# Patient Record
Sex: FEMALE | Race: WHITE | NOT HISPANIC OR LATINO | Employment: OTHER | URBAN - METROPOLITAN AREA
[De-identification: names, ages, dates, MRNs, and addresses within clinical notes are randomized per-mention and may not be internally consistent; named-entity substitution may affect disease eponyms.]

---

## 2017-09-01 ENCOUNTER — HOSPITAL ENCOUNTER (OUTPATIENT)
Dept: RADIOLOGY | Facility: MEDICAL CENTER | Age: 67
End: 2017-09-01
Attending: INTERNAL MEDICINE
Payer: MEDICARE

## 2017-09-01 DIAGNOSIS — J44.9 CHRONIC OBSTRUCTIVE PULMONARY DISEASE, UNSPECIFIED COPD TYPE (HCC): ICD-10-CM

## 2017-09-01 PROCEDURE — 71250 CT THORAX DX C-: CPT

## 2017-09-08 ENCOUNTER — OFFICE VISIT (OUTPATIENT)
Dept: PULMONOLOGY | Facility: HOSPICE | Age: 67
End: 2017-09-08
Payer: MEDICARE

## 2017-09-08 VITALS
TEMPERATURE: 97.5 F | HEIGHT: 58 IN | WEIGHT: 91.4 LBS | SYSTOLIC BLOOD PRESSURE: 112 MMHG | RESPIRATION RATE: 16 BRPM | OXYGEN SATURATION: 93 % | HEART RATE: 109 BPM | DIASTOLIC BLOOD PRESSURE: 72 MMHG | BODY MASS INDEX: 19.19 KG/M2

## 2017-09-08 DIAGNOSIS — J44.9 CHRONIC OBSTRUCTIVE PULMONARY DISEASE, UNSPECIFIED COPD TYPE (HCC): ICD-10-CM

## 2017-09-08 DIAGNOSIS — R91.8 ABNORMAL RADIOLOGIC FINDING OF LUNG FIELD: ICD-10-CM

## 2017-09-08 PROCEDURE — 99214 OFFICE O/P EST MOD 30 MIN: CPT | Mod: 25 | Performed by: NURSE PRACTITIONER

## 2017-09-08 PROCEDURE — G0009 ADMIN PNEUMOCOCCAL VACCINE: HCPCS | Performed by: NURSE PRACTITIONER

## 2017-09-08 PROCEDURE — 90670 PCV13 VACCINE IM: CPT | Performed by: NURSE PRACTITIONER

## 2017-09-08 RX ORDER — OXYCODONE AND ACETAMINOPHEN 10; 325 MG/1; MG/1
3 TABLET ORAL EVERY 4 HOURS PRN
COMMUNITY
End: 2018-10-31

## 2017-09-08 NOTE — PROGRESS NOTES
Chief Complaint   Patient presents with   • Results     CT results         HPI:  This is a 67 y.o. female with a history of chronic obstructive pulmonary disease. Pulmonary function tests from 9/1/16 indicate FEV1 2.24 L, 118% predicted, FEV1/FVC 78%, FEF 25-75% 108% predicted, and DLCO 135% predicted. The patient is compliant with Spiriva 18 µg one inhalation daily and Ventolin typically one time per day. The patient denies fevers, chills, sweats, and hemoptysis. She has intermittent shortness of breath and cough which is typically nonproductive. She has minimal wheezing. She is feeling sluggish and slow per her report. Ultimately she does not feel that she has a respiratory infection. CT scan dated 9/1/17 indicates stable 6 mm left lower lobe nodule, 4 mm nodule in the right upper lobe is now linear and likely scarring. There has been slight progression of the reticular nodular process in the lateral right upper lobe with increased probable scarring and atelectasis in the medial segment of the right middle lobe and anterior left lower lobe with other multiple small nodules which are stable compared to prior exam. There are signs of emphysema and COPD. Discussed this CT with Dr. Hollis who would recommend bronchoscopy or at least sputum sample for possible evaluation of TRAY. He feels any somewhat frail woman with fairly severe disease she may have underlying TRAY infection. Patient would like to try and produce sputum. If she does not we will obtain bronchoscopy for further evaluation of possible TRAY infection.    The patient has a history of polio and currently ambulates with crutches. She has been diagnosed with post polio syndrome. She has a history of asthma in the child. She is a current every day smoker.      Past Medical History:   Diagnosis Date   • COPD (chronic obstructive pulmonary disease) (CMS-[facility]) 9/8/2017   • Abnormal radiologic finding of lung field 9/8/2017    History of bilateral pulmonary nodules  "  • Asthma    • Back pain    • Chickenpox    • Fatigue    • Nepali measles    • Influenza    • Mumps    • Polio    • Wears glasses        Past Surgical History:   Procedure Laterality Date   • CHOLECYSTECTOMY     • GASTROSTOMY     • PRIMARY C SECTION     • TONSILLECTOMY         Social History   Substance Use Topics   • Smoking status: Current Every Day Smoker     Packs/day: 1.00     Years: 47.00     Types: Cigarettes   • Smokeless tobacco: Never Used   • Alcohol use No       ROS:   Constitutional: Denies fevers, chills, sweats, fatigue, and weight loss.  Eyes: Denies glasses.  Ears/nose/mouth/throat: Denies injury.  Cardiovascular: Denies chest pain, tightness.  Respiratory: See history of present illness.  GI: Denies heartburn, difficulty swallowing, nausea, and vomiting.  Neurological: Denies frequent headaches, dizziness, weakness.    Vitals:  Vitals:    09/08/17 0820   Height: 1.473 m (4' 10\")   Weight: 41.5 kg (91 lb 6.4 oz)   Weight % change since last entry.: 0 %   BP: 112/72   Pulse: (!) 109   BMI (Calculated): 19.1   Resp: 16   Temp: 36.4 °C (97.5 °F)   O2 sat % room air: 93 %       Allergies:  Tape; Ambien [zolpidem]; and Augmentin    Medications:  Current Outpatient Prescriptions   Medication Sig Dispense Refill   • Polyethylene Glycol 3350 (MIRALAX PO) Take  by mouth as needed.     • Oxycodone-Acetaminophen (PERCOCET-10)  MG Tab Take 3 Tabs by mouth every day.     • fluticasone (FLONASE) 50 MCG/ACT nasal spray Spray 1 Spray in nose every day.     • Calcium Carb-Cholecalciferol (CALCIUM 600 + D PO) Take 1 tablet by mouth every day.     • amlodipine (NORVASC) 5 MG Tab Take 5 mg by mouth every day.     • ranitidine (ZANTAC) 150 MG Tab Take 150 mg by mouth 2 times a day.     • Methocarbamol (ROBAXIN-750 PO) Take 1 tablet by mouth 2 Times a Day.     • albuterol (VENTOLIN OR PROVENTIL) 108 (90 BASE) MCG/ACT Aero Soln inhalation aerosol Inhale 2 Puffs by mouth every 6 hours as needed for Shortness of " Breath.     • oxycodone-acetaminophen (PERCOCET)  MG Tab Take 3 Tabs by mouth every four hours as needed for Severe Pain.     • hydrocodone/acetaminophen (NORCO)  MG Tab Take 1-2 Tabs by mouth every 6 hours as needed.       No current facility-administered medications for this visit.        PHYSICAL EXAM:  Appearance: Well-developed, well-nourished, no acute distress.  Eyes. PERRL.  Hearing: Grossly intact.  Oropharynx: Tongue normal, posterior pharynx without erythema or exudate.  Respiratory effort: No intercostal retractions or use of accessory muscles.  Lung auscultation: No crackles, wheezing.  Heart auscultation: No murmur, gallop, or rub. Regular rate and rhythm.  Extremities: No cyanosis or edema.  Gait and Station: Normal  Orientation: Oriented to time, place, and person.    Assessment:  1. Chronic obstructive pulmonary disease, unspecified COPD type (CMS-HCC)  PNEUMOCOCCAL CONJUGATE VACCINE 13-VALENT    AMB PULMONARY FUNCTION TEST/LAB    AFB CULTURE    CULTURE RESPIRATORY W/ GRM STN    SPUTUM CULTURE    CANCELED: CT-CHEST (THORAX) W/O   2. Abnormal radiologic finding of lung field  AFB CULTURE    CULTURE RESPIRATORY W/ GRM STN    SPUTUM CULTURE         Plan:  1. Patient will obtain influenza vaccine through primary care, [facility].  2. Prevnar 13 today.  3. Patient will require pneumococcal 23 between March and September 2018.  4. Pulmonary function tests at next visit in 3 months.  5. Sputum culture for AFB, Gram stain. If patient cannot produce a sputum sample will order bronchoscopy to look for atypical infection question TRAY. CT shows new reticular nodular process in the lateral right upper lobe.    Return in about 2 months (around 11/8/2017) for With PFT, With ROBEL Musa.

## 2017-09-08 NOTE — PATIENT INSTRUCTIONS
"1. Patient will obtain influenza vaccine through primary care, [facility].  2. Prevnar 13 today.  3. Patient will require pneumococcal 23 between March and September 2018.  4. Pulmonary function tests at next visit in 3 months.  5. Sputum culture for AFB, Gram stain. If patient cannot produce a sputum sample will order bronchoscopy to look for atypical infection question TRAY. CT shows new reticular nodular process in the lateral right upper lobe.              You Can Quit Smoking  If you are ready to quit smoking or are thinking about it, congratulations! You have chosen to help yourself be healthier and live longer! There are lots of different ways to quit smoking. Nicotine gum, nicotine patches, a nicotine inhaler, or nicotine nasal spray can help with physical craving. Hypnosis, support groups, and medicines help break the habit of smoking.  TIPS TO GET OFF AND STAY OFF CIGARETTES  · Learn to predict your moods. Do not let a bad situation be your excuse to have a cigarette. Some situations in your life might tempt you to have a cigarette.  · Ask friends and co-workers not to smoke around you.  · Make your home smoke-free.  · Never have \"just one\" cigarette. It leads to wanting another and another. Remind yourself of your decision to quit.  · On a card, make a list of your reasons for not smoking. Read it at least the same number of times a day as you have a cigarette. Tell yourself everyday, \"I do not want to smoke. I choose not to smoke.\"  · Ask someone at home or work to help you with your plan to quit smoking.  · Have something planned after you eat or have a cup of coffee. Take a walk or get other exercise to perk you up. This will help to keep you from overeating.  · Try a relaxation exercise to calm you down and decrease your stress. Remember, you may be tense and nervous the first two weeks after you quit. This will pass.  · Find new activities to keep your hands busy. Play with a pen, coin, or rubber band. " "Doodle or draw things on paper.  · Brush your teeth right after eating. This will help cut down the craving for the taste of tobacco after meals. You can try mouthwash too.  · Try gum, breath mints, or diet candy to keep something in your mouth.  IF YOU SMOKE AND WANT TO QUIT:  · Do not stock up on cigarettes. Never buy a carton. Wait until one pack is finished before you buy another.  · Never carry cigarettes with you at work or at home.  · Keep cigarettes as far away from you as possible. Leave them with someone else.  · Never carry matches or a lighter with you.  · Ask yourself, \"Do I need this cigarette or is this just a reflex?\"  · Bet with someone that you can quit. Put cigarette money in a [employer] bank every morning. If you smoke, you give up the money. If you do not smoke, by the end of the week, you keep the money.  · Keep trying. It takes 21 days to change a habit!  · Talk to your doctor about using medicines to help you quit. These include nicotine replacement gum, lozenges, or skin patches.     This information is not intended to replace advice given to you by your health care provider. Make sure you discuss any questions you have with your health care provider.     Document Released: 10/14/2010 Document Revised: 03/11/2013 Document Reviewed: 10/14/2010  [employer] Patient Education ©2016 [employer]    "

## 2017-09-18 ENCOUNTER — HOSPITAL ENCOUNTER (OUTPATIENT)
Facility: MEDICAL CENTER | Age: 67
End: 2017-09-18
Attending: NURSE PRACTITIONER
Payer: MEDICARE

## 2017-09-18 DIAGNOSIS — J44.9 CHRONIC OBSTRUCTIVE PULMONARY DISEASE, UNSPECIFIED COPD TYPE (HCC): ICD-10-CM

## 2017-09-18 DIAGNOSIS — R91.8 ABNORMAL RADIOLOGIC FINDING OF LUNG FIELD: ICD-10-CM

## 2017-09-18 PROCEDURE — 87206 SMEAR FLUORESCENT/ACID STAI: CPT

## 2017-09-18 PROCEDURE — 87015 SPECIMEN INFECT AGNT CONCNTJ: CPT

## 2017-09-18 PROCEDURE — 87116 MYCOBACTERIA CULTURE: CPT

## 2017-09-18 PROCEDURE — 87070 CULTURE OTHR SPECIMN AEROBIC: CPT

## 2017-09-18 PROCEDURE — 87205 SMEAR GRAM STAIN: CPT

## 2017-09-19 LAB
GRAM STN SPEC: NORMAL
SIGNIFICANT IND 70042: NORMAL
SOURCE SOURCE: NORMAL

## 2017-09-20 LAB
BACTERIA SPEC RESP CULT: NORMAL
GRAM STN SPEC: NORMAL
RHODAMINE-AURAMINE STN SPEC: NORMAL
SIGNIFICANT IND 70042: NORMAL
SIGNIFICANT IND 70042: NORMAL
SITE SITE: NORMAL
SOURCE SOURCE: NORMAL
SOURCE SOURCE: NORMAL

## 2017-09-28 ENCOUNTER — TELEPHONE (OUTPATIENT)
Dept: PULMONOLOGY | Facility: HOSPICE | Age: 67
End: 2017-09-28

## 2017-09-28 NOTE — TELEPHONE ENCOUNTER
Informed patient that AFB and fungal cultures are still in progress. Gram stain shows no specific bacteria. The patient reports no fevers, chills, sweats, or hemoptysis. She still has shortness of breath but this is fairly stable. At this time she does not feel sick. She will contact me if anything should change. At this time she is still declining bronchoscopy but has an appointment 11/17/17 with myself.

## 2017-09-28 NOTE — TELEPHONE ENCOUNTER
Pt called requesting the results to the labs done on 9/18/17.    Last OV: 9/8/17- Daphne  Next OV: 11/17/17  COPD    Sherri Burgos is attached to this message for she saw the pt last.

## 2017-11-01 ENCOUNTER — NON-PROVIDER VISIT (OUTPATIENT)
Dept: PULMONOLOGY | Facility: HOSPICE | Age: 67
End: 2017-11-01
Payer: MEDICARE

## 2017-11-01 DIAGNOSIS — J44.9 CHRONIC OBSTRUCTIVE PULMONARY DISEASE, UNSPECIFIED COPD TYPE (HCC): ICD-10-CM

## 2017-11-01 PROCEDURE — 94726 PLETHYSMOGRAPHY LUNG VOLUMES: CPT | Performed by: INTERNAL MEDICINE

## 2017-11-01 PROCEDURE — 94060 EVALUATION OF WHEEZING: CPT | Performed by: INTERNAL MEDICINE

## 2017-11-01 PROCEDURE — 94729 DIFFUSING CAPACITY: CPT | Performed by: INTERNAL MEDICINE

## 2017-11-01 ASSESSMENT — PULMONARY FUNCTION TESTS
FEV1_PERCENT_PREDICTED: 97
FEV1/FVC_PERCENT_CHANGE: 100
FEV1_PERCENT_PREDICTED: 98
FEV1_PREDICTED: 1.87
FVC_PERCENT_PREDICTED: 114
FEV1/FVC_PERCENT_PREDICTED: 76
FVC_PREDICTED: 2.45
FEV1: 1.82
FEV1_PERCENT_CHANGE: -1
FVC_PERCENT_PREDICTED: 113
FEV1/FVC: 66
FVC: 2.77
FEV1_PERCENT_CHANGE: -1
FEV1/FVC_PERCENT_PREDICTED: 86
FVC: 2.81
FEV1: 1.85
FEV1/FVC: 65.7
FEV1/FVC_PERCENT_PREDICTED: 86

## 2017-11-01 NOTE — PROCEDURES
Good patient effort & cooperation.  The results of this test meet the ATS standards for acceptability and repeatability. There was much coughing in the Pre FVC maneuvers. A bronchodilator of Ventolin HFA- 2puffs via spacer was administered.    The FVC is 2.77 L or 113%, FEV1 is 1.82 L or 97%, FEV1/FVC 66%. Increased TLC and RV. DLCO 104%. No reversibility noted.    Interpretation:  PFTs are consistent with stage I COPD.  Hyperinflation and air trapping consistent with mild obstructive lung disease.  Normal gas transfer.  Lack of bronchodilator response does not preclude using inhalers when clinically indicated.

## 2017-11-15 LAB
MYCOBACTERIUM SPEC CULT: NORMAL
RHODAMINE-AURAMINE STN SPEC: NORMAL
SIGNIFICANT IND 70042: NORMAL
SITE SITE: NORMAL
SOURCE SOURCE: NORMAL

## 2017-11-28 ENCOUNTER — OFFICE VISIT (OUTPATIENT)
Dept: PULMONOLOGY | Facility: HOSPICE | Age: 67
End: 2017-11-28
Payer: MEDICARE

## 2017-11-28 VITALS
HEART RATE: 101 BPM | HEIGHT: 58 IN | OXYGEN SATURATION: 93 % | TEMPERATURE: 97.9 F | RESPIRATION RATE: 16 BRPM | SYSTOLIC BLOOD PRESSURE: 140 MMHG | BODY MASS INDEX: 19.1 KG/M2 | WEIGHT: 91 LBS | DIASTOLIC BLOOD PRESSURE: 82 MMHG

## 2017-11-28 DIAGNOSIS — J44.9 COPD, MILD (HCC): ICD-10-CM

## 2017-11-28 DIAGNOSIS — R91.8 RETICULONODULAR INFILTRATE PRESENT ON IMAGING OF CHEST: ICD-10-CM

## 2017-11-28 PROCEDURE — 99214 OFFICE O/P EST MOD 30 MIN: CPT | Performed by: INTERNAL MEDICINE

## 2017-11-28 RX ORDER — METHOCARBAMOL 750 MG/1
750 TABLET, FILM COATED ORAL 3 TIMES DAILY
Refills: 0 | COMMUNITY
Start: 2017-11-20 | End: 2018-10-31

## 2017-11-28 RX ORDER — BUSPIRONE HYDROCHLORIDE 30 MG/1
TABLET ORAL
Refills: 2 | COMMUNITY
Start: 2017-11-15

## 2017-11-28 RX ORDER — ALBUTEROL SULFATE 90 UG/1
2 AEROSOL, METERED RESPIRATORY (INHALATION) EVERY 4 HOURS PRN
Qty: 1 INHALER | Refills: 11 | Status: SHIPPED | OUTPATIENT
Start: 2017-11-28 | End: 2018-06-18 | Stop reason: SDUPTHER

## 2017-11-28 RX ORDER — ALBUTEROL SULFATE 90 UG/1
2 AEROSOL, METERED RESPIRATORY (INHALATION) EVERY 4 HOURS PRN
Qty: 1 INHALER | Refills: 1 | Status: SHIPPED | OUTPATIENT
Start: 2017-11-28 | End: 2017-11-28 | Stop reason: SDUPTHER

## 2017-11-28 NOTE — PROGRESS NOTES
Jewell Skelton is a 67 y.o. female here for COPD.    History of Present Illness:    The patient's a 67-year-old female comes in for follow-up of COPD. She also some mild reticular nodular infiltrates in the right midlung. Sputum AFBs were performed which were negative. The patient still an active smoker. Pulmonary function tests show evidence of mild airflow obstruction and there was not a significant improvement after bronchodilator. The diffusion capacity is within normal limits. There is air trapping on lung volumes. Patient has some mild coughing of yellow mucus production. She wants to quit smoking. She has otherwise no other new concerns today.    Constitutional:  Negative for fever, chills, sweats, and fatigue.  Eyes:  Negative for eye pain and visual changes.  HENT:  Negative for tinnitus and hoarse voice.  Cardiovascular:  Negative for chest pain, leg swelling, syncope and orthopnea.  Respiratory:  See HPI for pertinent negatives  Sleep:  Negative for somnolence, loud snoring, sleep disturbance due to breathing, insomnia.  Gastrointestinal:  Negative for dysphagia, nausea and abdominal pain.  Heme/lymph:  Denies easy bruising, blood clots.  Musculoskeletal:  Negative for arthralgias, sore muscles and back pain.  Skin:  Negative for rash and color change.  Neurological:  Negative for headaches, lightheadedness and weakness.  Psychiatric:  Denies depression.    Current Outpatient Prescriptions   Medication Sig Dispense Refill   • albuterol (PROAIR HFA) 108 (90 Base) MCG/ACT Aero Soln inhalation aerosol Inhale 2 Puffs by mouth every four hours as needed for Shortness of Breath (wheezing). 1 Inhaler 11   • busPIRone (BUSPAR) 30 MG tablet TAKE ONE TABLET BY MOUTH TWICE PER DAY.  2   • methocarbamol (ROBAXIN) 750 MG Tab Take 750 mg by mouth 3 times a day.  0   • oxycodone-acetaminophen (PERCOCET)  MG Tab Take 3 Tabs by mouth every four hours as needed for Severe Pain.     • Polyethylene Glycol 3350 (MIRALAX  "PO) Take  by mouth as needed.     • Oxycodone-Acetaminophen (PERCOCET-10)  MG Tab Take 3 Tabs by mouth every day.     • fluticasone (FLONASE) 50 MCG/ACT nasal spray Spray 1 Spray in nose every day.     • Calcium Carb-Cholecalciferol (CALCIUM 600 + D PO) Take 1 tablet by mouth every day.     • amlodipine (NORVASC) 5 MG Tab Take 5 mg by mouth every day.     • ranitidine (ZANTAC) 150 MG Tab Take 150 mg by mouth 2 times a day.     • hydrocodone/acetaminophen (NORCO)  MG Tab Take 1-2 Tabs by mouth every 6 hours as needed.     • Methocarbamol (ROBAXIN-750 PO) Take 1 tablet by mouth 2 Times a Day.     • albuterol (VENTOLIN OR PROVENTIL) 108 (90 BASE) MCG/ACT Aero Soln inhalation aerosol Inhale 2 Puffs by mouth every 6 hours as needed for Shortness of Breath.       No current facility-administered medications for this visit.        Social History   Substance Use Topics   • Smoking status: Current Every Day Smoker     Packs/day: 1.00     Years: 47.00     Types: Cigarettes   • Smokeless tobacco: Never Used   • Alcohol use No       Past Medical History:   Diagnosis Date   • Abnormal radiologic finding of lung field 9/8/2017    History of bilateral pulmonary nodules   • Asthma    • Back pain    • Chickenpox    • COPD (chronic obstructive pulmonary disease) (CMS-[facility]) 9/8/2017   • Fatigue    • Vietnamese measles    • Influenza    • Mumps    • Polio    • Wears glasses        Past Surgical History:   Procedure Laterality Date   • CHOLECYSTECTOMY     • GASTROSTOMY     • PRIMARY C SECTION     • TONSILLECTOMY         Allergies:  Tape; Ambien [zolpidem]; and Augmentin    Family History   Problem Relation Age of Onset   • Diabetes Mother    • Lung Cancer Father        Physical Examination    Vitals:    11/28/17 0805   Height: 1.473 m (4' 10\")   Weight: 41.3 kg (91 lb)   Weight % change since last entry.: 0 %   BP: 140/82   Pulse: (!) 101   BMI (Calculated): 19.02   Resp: 16   Temp: 36.6 °C (97.9 °F)   O2 sat % room air: 93 % "       Physical Exam:  Constitutional:  Well developed and well nourished.  Head:  Normocephalic and atraumatic.  Nose:  Nose normal.  Mouth/Throat:  Oropharynx is clear and moist, no lesions.    Neck:  Normal range of motion.  Supple.  No JVD.  Cardiovascular:  Normal rate, regular rhythm, normal heart sounds. No edema  Pulmonary/Chest: No wheezing, rales or rhonchi.  Respiratory effort non labored  Musculoskeletal.  No muscular atrophy.  Lymphadenopathy:  No cervical or supraclavicular adenopathy  Neurological:  Alert and oriented.  Cranial nerves intact.  No focal deficits  Skin:  No rashes or ulcers.  Psyciatric:  Normal mood and affect.      Assessment and Plan:  1. COPD, mild (CMS-HCC)  The patient has mild COPD based on pulmonary function testing. She still actively smoking. She is on Spiriva. I referred her to the smoking cessation program. I strongly encourage patient to quit smoking and she is going to use the NicoDerm patches. I have also prescribed albuterol inhaler to be used as a rescue inhaler.  - REFERRAL TO TOBACCO CESSATION PROGRAM    2. Reticulonodular infiltrate present on imaging of chest  I reviewed the CT scan of the chest with the patient. She has very mild reticular nodular type infiltrates. This could be related to TRAY disease or could be inflammation from smoking. Neoplastic disease possibility but less likely. I have offered the patient bronchoscopy but she is refusing. I have suggested that she should work on quitting smoking. We will arrange for her to have a flutter device which she should use at least 2-3 times a day. And we will repeat the CT scan of chest in April. If there is continued progression then recommended bronchoscopy or CT biopsy.  - DME OTHER  - CT-CHEST (THORAX) W/O; Future  - REFERRAL TO TOBACCO CESSATION PROGRAM        Followup Return in about 5 months (around 4/28/2018) for follow up visit with APRN, follow up with the pulmonary physician.

## 2018-02-27 ENCOUNTER — TELEPHONE (OUTPATIENT)
Dept: PULMONOLOGY | Facility: HOSPICE | Age: 68
End: 2018-02-27

## 2018-02-27 DIAGNOSIS — J44.9 CHRONIC OBSTRUCTIVE PULMONARY DISEASE, UNSPECIFIED COPD TYPE (HCC): ICD-10-CM

## 2018-02-27 NOTE — TELEPHONE ENCOUNTER
Chani the pt's daughter called requesting surgical clearance, states that Dr. Jerzy Barrera is going to perform thumb surgery on the pt once she is cleared from a pulmonary standpoint.    Last OV: 11/28/17- Dr. Hollis    Next OV: 5/16/18    COPD

## 2018-02-27 NOTE — TELEPHONE ENCOUNTER
Soila,    I spoke w/ Chani the pt's daughter, gave her your message and transferred her to scheduling to make sooner ov.    Order pending for CXR.

## 2018-02-27 NOTE — TELEPHONE ENCOUNTER
Pt has adequate lung function for needed surgery based on PFT's in November 2017. However if she requires clearance from our office then an updated chest xray and office visit would be needed.

## 2018-03-16 ENCOUNTER — APPOINTMENT (OUTPATIENT)
Dept: RADIOLOGY | Facility: IMAGING CENTER | Age: 68
End: 2018-03-16
Payer: MEDICARE

## 2018-03-16 ENCOUNTER — APPOINTMENT (OUTPATIENT)
Dept: PULMONOLOGY | Facility: HOSPICE | Age: 68
End: 2018-03-16
Payer: MEDICARE

## 2018-04-16 ENCOUNTER — OFFICE VISIT (OUTPATIENT)
Dept: PULMONOLOGY | Facility: HOSPICE | Age: 68
End: 2018-04-16
Payer: MEDICARE

## 2018-04-16 VITALS
BODY MASS INDEX: 17 KG/M2 | HEIGHT: 58 IN | RESPIRATION RATE: 16 BRPM | WEIGHT: 81 LBS | HEART RATE: 108 BPM | SYSTOLIC BLOOD PRESSURE: 140 MMHG | DIASTOLIC BLOOD PRESSURE: 78 MMHG | OXYGEN SATURATION: 92 % | TEMPERATURE: 97.9 F

## 2018-04-16 DIAGNOSIS — R91.8 ABNORMAL RADIOLOGIC FINDING OF LUNG FIELD: ICD-10-CM

## 2018-04-16 DIAGNOSIS — J44.9 CHRONIC OBSTRUCTIVE PULMONARY DISEASE, UNSPECIFIED COPD TYPE (HCC): ICD-10-CM

## 2018-04-16 DIAGNOSIS — Z72.0 TOBACCO USE: ICD-10-CM

## 2018-04-16 PROCEDURE — 99214 OFFICE O/P EST MOD 30 MIN: CPT | Performed by: NURSE PRACTITIONER

## 2018-04-16 RX ORDER — TIOTROPIUM BROMIDE 18 UG/1
18 CAPSULE ORAL; RESPIRATORY (INHALATION) DAILY
COMMUNITY
End: 2018-04-16

## 2018-04-16 RX ORDER — LORATADINE 10 MG/1
10 TABLET ORAL
Refills: 11 | COMMUNITY
Start: 2018-03-28 | End: 2018-10-31

## 2018-04-16 RX ORDER — METHYLPREDNISOLONE 4 MG/1
TABLET ORAL
Qty: 21 TAB | Refills: 0 | Status: SHIPPED | OUTPATIENT
Start: 2018-04-16 | End: 2018-05-16

## 2018-04-16 RX ORDER — AZITHROMYCIN 250 MG/1
TABLET, FILM COATED ORAL
Qty: 6 TAB | Refills: 0 | Status: SHIPPED | OUTPATIENT
Start: 2018-04-16 | End: 2018-07-16

## 2018-04-16 NOTE — PATIENT INSTRUCTIONS
1) Start Stiolto 2 puff, once a day - sample sent  2) Continue rescue inhaler as needed  3) Smoking cessation discussed  4) CT of chest now for f/u  5) Vaccines: Up to date with Prevnar 13, Pneumovax 23  6) Return in about 6 weeks (around 5/28/2018) for CAT scan results, review of symptoms, if not sooner, follow up with ROBEL Robles.

## 2018-04-16 NOTE — PROGRESS NOTES
CC:  Here for f/u pulmonary issues as listed below    HPI:   Jewell presents today for follow up for COPD. PFTs from 11/2017 indicate a Fev1 of 1.85L or 98% predicted without bronchodilator response, Fev1/FVC ratio of 66, DLCO 104%.  Patient is a current smoker one pack per day, 47-pack-year history. She is trying to quit and has been going to the [facility] therapist with some benefit. She declined smoking cessation program. CAT scan of her tests completed September 1, 2017 showed stable 6 mm left lower lobe nodule, largest 4 mm nodule in the right upper lobe is now a linear area of scarring.Slight progression of a reticulonodular process in the lateral right upper lobe with increase in probable scarring and atelectasis in the medial segment right middle lobe and anterior left lower lobe. These areas are most consistent with inflammatory process.  Multiple other smaller nodules are similar to the prior study. There are no new suspicious nodules.  There is underlying emphysema and COPD. per the CAT scan results Dr. warren recommended bronchoscopy or CT biopsy given her results may be related to TRAY, or inflammation or neoplastic disease. She forgot to complete the CAT scan before her today. She understands the benefits of completing updated CAT scan.    She is compliant using Spiriva daily and rescue inhaler daily. She finds that her breathing is stable since last office visit. She has an occasional cough and occasional chest tightness related to anxiety. She has been having an increase in nasal congestion producing white mucus.          Patient Active Problem List    Diagnosis Date Noted   • Tobacco use 04/16/2018   • COPD (chronic obstructive pulmonary disease) (CMS-[facility]) 09/08/2017   • Abnormal radiologic finding of lung field 09/08/2017       Past Medical History:   Diagnosis Date   • Abnormal radiologic finding of lung field 9/8/2017    History of bilateral pulmonary nodules   • Asthma    • Back pain    •  "Chickenpox    • COPD (chronic obstructive pulmonary disease) (CMS-[facility]) 9/8/2017   • Fatigue    • Turkish measles    • Influenza    • Mumps    • Polio    • Wears glasses        Past Surgical History:   Procedure Laterality Date   • CHOLECYSTECTOMY     • GASTROSTOMY     • PRIMARY C SECTION     • TONSILLECTOMY         Family History   Problem Relation Age of Onset   • Diabetes Mother    • Lung Cancer Father        Social History   Substance Use Topics   • Smoking status: Current Every Day Smoker     Packs/day: 1.00     Years: 47.00     Types: Cigarettes   • Smokeless tobacco: Never Used      Comment: \"working on cutting down as a family\"   • Alcohol use No       Current Outpatient Prescriptions   Medication Sig Dispense Refill   • loratadine (CLARITIN) 10 MG Tab Take 10 mg by mouth every day.  11   • Tiotropium Bromide-Olodaterol (STIOLTO RESPIMAT) 2.5-2.5 MCG/ACT Aero Soln Inhale 2 Puffs by mouth every day. 1 Inhaler 11   • azithromycin (ZITHROMAX) 250 MG Tab Take 2 tablets on day 1, then take 1 tablet a day for 4 days. 6 Tab 0   • MethylPREDNISolone (MEDROL DOSEPAK) 4 MG Tablet Therapy Pack Take as directed. 21 Tab 0   • busPIRone (BUSPAR) 30 MG tablet TAKE ONE TABLET BY MOUTH TWICE PER DAY.  2   • methocarbamol (ROBAXIN) 750 MG Tab Take 750 mg by mouth 3 times a day.  0   • albuterol (PROAIR HFA) 108 (90 Base) MCG/ACT Aero Soln inhalation aerosol Inhale 2 Puffs by mouth every four hours as needed for Shortness of Breath (wheezing). 1 Inhaler 11   • oxycodone-acetaminophen (PERCOCET)  MG Tab Take 3 Tabs by mouth every four hours as needed for Severe Pain.     • fluticasone (FLONASE) 50 MCG/ACT nasal spray Spray 1 Spray in nose every day.     • Calcium Carb-Cholecalciferol (CALCIUM 600 + D PO) Take 1 tablet by mouth every day.     • amlodipine (NORVASC) 5 MG Tab Take 5 mg by mouth every day.     • ranitidine (ZANTAC) 150 MG Tab Take 150 mg by mouth 2 times a day.     • Polyethylene Glycol 3350 (MIRALAX PO) Take  " "by mouth as needed.     • Oxycodone-Acetaminophen (PERCOCET-10)  MG Tab Take 3 Tabs by mouth every day.     • hydrocodone/acetaminophen (NORCO)  MG Tab Take 1-2 Tabs by mouth every 6 hours as needed.     • Methocarbamol (ROBAXIN-750 PO) Take 1 tablet by mouth 3 times a day.     • albuterol (VENTOLIN OR PROVENTIL) 108 (90 BASE) MCG/ACT Aero Soln inhalation aerosol Inhale 2 Puffs by mouth every 6 hours as needed for Shortness of Breath.       No current facility-administered medications for this visit.           Allergies: Tape; Ambien [zolpidem]; and Augmentin          ROS   Gen: Denies fever, chills, unintentional weight loss, fatigue, night sweats  E/N/T: Denies  ear pain  Resp:Denies  wheezing, hemoptysis  CV: Denies chest pain, chest tightness, palpitations  Sleep:Denies morning headache  Neuro: Denies frequent headaches, weakness, dizziness  GI: Denies acid reflux, N/V  See HPI.  All other systems reviewed and negative          Vital signs for this encounter:  Vitals:    04/16/18 1317   Height: 1.473 m (4' 10\")   Weight: 36.7 kg (81 lb)   Weight % change since last entry.: 0 %   BP: 140/78   Pulse: (!) 108   BMI (Calculated): 16.93   Resp: 16   Temp: 36.6 °C (97.9 °F)   O2 sat % room air: 92 %                 Physical Exam:   Appearance: well developed, well nourished, no acute distress.   Eyes: PERRL, EOM intact, sclere white, conjunctiva moist.  Ears: no lesions or deformities.  Hearing: grossly intact.  Nose: no lesions or deformities.  Dentition: good dentition.   Oropharynx: tongue normal, posterior pharynx without erythema or exudate.  Neck: supple, trachea midline, no masses.  Respiratory effort: no intercostal retractions or use of accessory muscles.  Lung auscultation: Bilateral diminished   Heart auscultation: no murmur, rub, or gallop   Extremities: no cyanosis or edema.  Abdomen: soft, non-tender, no masses.  Gait and station: without difficulty   Digits and Nails: no clubbing, cyanosis, " petechiae, or nodes.  Cranial nerves: grossly normal.  Motor: no focal deficits observed.   Skin: no rashes, lesions, or ulcers noted.  Orientation: oriented to time, place, and person.  Mood and affect: mood and affect appropriate, normal interaction with examiner.      Assessment   1. Abnormal radiologic finding of lung field     2. Chronic obstructive pulmonary disease, unspecified COPD type (CMS-HCC)     3. Tobacco use         Patient was seen for 25 minutes, more than 50% of time spent in face to face review, counseling, and arranging future evaluation and follow up of medical conditions and care.     PLAN:   Patient Instructions   1) Start Stiolto 2 puff, once a day - sample sent  2) Continue rescue inhaler as needed  3) Smoking cessation discussed  4) CT of chest now for f/u  5) Vaccines: Up to date with Prevnar 13, Pneumovax 23  6) Return in about 6 weeks (around 5/28/2018) for CAT scan results, review of symptoms, if not sooner, follow up with ROBEL Robles.

## 2018-04-27 ENCOUNTER — APPOINTMENT (OUTPATIENT)
Dept: RADIOLOGY | Facility: MEDICAL CENTER | Age: 68
End: 2018-04-27
Attending: PHYSICIAN ASSISTANT
Payer: MEDICARE

## 2018-04-29 ENCOUNTER — HOSPITAL ENCOUNTER (OUTPATIENT)
Dept: RADIOLOGY | Facility: MEDICAL CENTER | Age: 68
End: 2018-04-29
Attending: INTERNAL MEDICINE
Payer: MEDICARE

## 2018-04-29 DIAGNOSIS — R91.8 RETICULONODULAR INFILTRATE PRESENT ON IMAGING OF CHEST: ICD-10-CM

## 2018-04-29 PROCEDURE — 71250 CT THORAX DX C-: CPT

## 2018-05-15 ENCOUNTER — HOSPITAL ENCOUNTER (OUTPATIENT)
Dept: RADIOLOGY | Facility: MEDICAL CENTER | Age: 68
End: 2018-05-15
Attending: PHYSICIAN ASSISTANT
Payer: MEDICARE

## 2018-05-15 DIAGNOSIS — Z12.31 VISIT FOR SCREENING MAMMOGRAM: ICD-10-CM

## 2018-05-15 PROCEDURE — 77067 SCR MAMMO BI INCL CAD: CPT

## 2018-05-16 ENCOUNTER — OFFICE VISIT (OUTPATIENT)
Dept: PULMONOLOGY | Facility: HOSPICE | Age: 68
End: 2018-05-16
Payer: MEDICARE

## 2018-05-16 VITALS
WEIGHT: 80 LBS | SYSTOLIC BLOOD PRESSURE: 136 MMHG | HEIGHT: 58 IN | OXYGEN SATURATION: 96 % | DIASTOLIC BLOOD PRESSURE: 86 MMHG | TEMPERATURE: 97.2 F | BODY MASS INDEX: 16.79 KG/M2 | RESPIRATION RATE: 16 BRPM | HEART RATE: 96 BPM

## 2018-05-16 DIAGNOSIS — R91.8 PULMONARY NODULES: ICD-10-CM

## 2018-05-16 DIAGNOSIS — Z72.0 TOBACCO USE: ICD-10-CM

## 2018-05-16 DIAGNOSIS — Z71.6 TOBACCO ABUSE COUNSELING: ICD-10-CM

## 2018-05-16 DIAGNOSIS — J44.9 CHRONIC OBSTRUCTIVE PULMONARY DISEASE, UNSPECIFIED COPD TYPE (HCC): ICD-10-CM

## 2018-05-16 PROCEDURE — 99214 OFFICE O/P EST MOD 30 MIN: CPT | Performed by: NURSE PRACTITIONER

## 2018-05-16 RX ORDER — AZITHROMYCIN 250 MG/1
TABLET, FILM COATED ORAL
Qty: 6 TAB | Refills: 0 | Status: SHIPPED | OUTPATIENT
Start: 2018-05-16 | End: 2018-07-16

## 2018-05-16 RX ORDER — PREDNISONE 10 MG/1
TABLET ORAL
Qty: 18 TAB | Refills: 0 | Status: SHIPPED | OUTPATIENT
Start: 2018-05-16 | End: 2018-07-16

## 2018-05-16 NOTE — PATIENT INSTRUCTIONS
1) Start Anoro 1 puff, once a day - sample sent  2) Continue rescue inhaler as needed  3) Smoking cessation discussed  4) Prednisone taper and zpack for emergency   5) Vaccines: Up to date with Prevnar 13, Pneumovax 23  6) Encourage light conditioning  7) Return in about 2 months (around 7/16/2018) for follow up with ROBEL Roblse, if not sooner, review of symptoms.

## 2018-05-16 NOTE — PROGRESS NOTES
CC:  Here for f/u pulmonary issues as listed below    HPI:   Jewell presents today for follow up for COPD. PFTs from 11/2017 indicate a Fev1 of 1.85L or 98% predicted without bronchodilator response, Fev1/FVC ratio of 66, DLCO 104%.  Patient is a current smoker one pack per day, 47-pack-year history. She is trying to quit and has been going to the [facility] therapist with some benefit. She continues to decline smoking cessation program.     CAT scan of her tests completed September 1, 2017 showed stable 6 mm left lower lobe nodule, largest 4 mm nodule in the right upper lobe is now a linear area of scarring.Slight progression of a reticulonodular process in the lateral right upper lobe with increase in probable scarring and atelectasis in the medial segment right middle lobe and anterior left lower lobe. These areas are most consistent with inflammatory process.  Multiple other smaller nodules are similar to the prior study. There are no new suspicious nodules.  There is underlying emphysema and COPD. per the CAT scan results Dr. warren recommended bronchoscopy or CT biopsy given her results may be related to TRAY, or inflammation or neoplastic disease. She did not complete updated scan until 4/29/2018 showing decrease in prominence of reticular nodular opacifications laterally in right upper lobe.  Stability and 6 mm noncalcified pulmonary nodule in left lower lobe.  Unchanged scattered smaller peripheral pulmonary nodules.  Review CAT scan with Dr. Hagan who recommends yearly CAT scan.    Required Z-Emerson and Medrol pack since last office visit.  She did not realize to take them together.  After she took Medrol pack she became very nervous, lower back pain and had trouble sleeping.  Overall her symptoms are much improved.  She was started on Stiolto last office visit but has difficulty twisting the inhaler.  She found Spiriva to be more beneficial, however she has not required her rescue inhaler since last  "office visit.  She finds that her breathing is stable since last office visit. She has an occasional cough with white mucus, and occasional chest tightness related to anxiety. She has been having an increase in nasal congestion producing white mucus.        Patient Active Problem List    Diagnosis Date Noted   • Tobacco abuse counseling 05/16/2018   • Pulmonary nodules 05/16/2018   • Tobacco use 04/16/2018   • COPD (chronic obstructive pulmonary disease) ([facility]) 09/08/2017   • Abnormal radiologic finding of lung field 09/08/2017       Past Medical History:   Diagnosis Date   • Abnormal radiologic finding of lung field 9/8/2017    History of bilateral pulmonary nodules   • Asthma    • Back pain    • Chickenpox    • COPD (chronic obstructive pulmonary disease) ([facility]) 9/8/2017   • Fatigue    • Mohawk measles    • Influenza    • Mumps    • Polio    • Wears glasses        Past Surgical History:   Procedure Laterality Date   • CHOLECYSTECTOMY     • GASTROSTOMY     • PRIMARY C SECTION     • TONSILLECTOMY         Family History   Problem Relation Age of Onset   • Diabetes Mother    • Lung Cancer Father        Social History   Substance Use Topics   • Smoking status: Current Every Day Smoker     Packs/day: 1.00     Years: 47.00     Types: Cigarettes   • Smokeless tobacco: Never Used      Comment: \"working on cutting down as a family\"   • Alcohol use No       Current Outpatient Prescriptions   Medication Sig Dispense Refill   • umeclidinium-vilanterol (ANORO ELLIPTA) 62.5-25 MCG/INH AEROSOL POWDER, BREATH ACTIVATED inhaler Inhale 1 Puff by mouth every day. 1 Each 11   • predniSONE (DELTASONE) 10 MG Tab Take 30mg x 3 days, then take 20mg x 3 days, then take 10mg x 3 days, with food, then discontinue. 18 Tab 0   • azithromycin (ZITHROMAX) 250 MG Tab Take 2 tablets on day 1, then take 1 tablet a day for 4 days. 6 Tab 0   • loratadine (CLARITIN) 10 MG Tab Take 10 mg by mouth every day.  11   • busPIRone (BUSPAR) 30 MG tablet TAKE " ONE TABLET BY MOUTH TWICE PER DAY.  2   • methocarbamol (ROBAXIN) 750 MG Tab Take 750 mg by mouth 3 times a day.  0   • oxycodone-acetaminophen (PERCOCET)  MG Tab Take 3 Tabs by mouth every four hours as needed for Severe Pain.     • Polyethylene Glycol 3350 (MIRALAX PO) Take  by mouth as needed.     • fluticasone (FLONASE) 50 MCG/ACT nasal spray Spray 1 Spray in nose every day.     • Calcium Carb-Cholecalciferol (CALCIUM 600 + D PO) Take 1 tablet by mouth every day.     • amlodipine (NORVASC) 5 MG Tab Take 5 mg by mouth every day.     • ranitidine (ZANTAC) 150 MG Tab Take 150 mg by mouth 2 times a day.     • azithromycin (ZITHROMAX) 250 MG Tab Take 2 tablets on day 1, then take 1 tablet a day for 4 days. (Patient not taking: Reported on 5/16/2018) 6 Tab 0   • albuterol (PROAIR HFA) 108 (90 Base) MCG/ACT Aero Soln inhalation aerosol Inhale 2 Puffs by mouth every four hours as needed for Shortness of Breath (wheezing). 1 Inhaler 11   • Oxycodone-Acetaminophen (PERCOCET-10)  MG Tab Take 3 Tabs by mouth every day.     • hydrocodone/acetaminophen (NORCO)  MG Tab Take 1-2 Tabs by mouth every 6 hours as needed.     • Methocarbamol (ROBAXIN-750 PO) Take 1 tablet by mouth 3 times a day.     • albuterol (VENTOLIN OR PROVENTIL) 108 (90 BASE) MCG/ACT Aero Soln inhalation aerosol Inhale 2 Puffs by mouth every 6 hours as needed for Shortness of Breath.       No current facility-administered medications for this visit.           Allergies: Tape; Ambien [zolpidem]; and Augmentin          ROS   Gen: Denies fever, chills, unintentional weight loss, fatigue, night sweats  E/N/T: Denies nasal congestion, ear pain  Resp:Denies  hemoptysis  CV: Denies chest pain, chest tightness, palpitations  Sleep:Denies morning headache  Neuro: Denies frequent headaches, weakness, dizziness  GI: Denies acid reflux, N/V  See HPI.  All other systems reviewed and negative          Vital signs for this encounter:  Vitals:    05/16/18  "1130   Height: 1.473 m (4' 10\")   Weight: 36.3 kg (80 lb)   Weight % change since last entry.: 0 %   BP: 136/86   Pulse: 96   BMI (Calculated): 16.72   Resp: 16   Temp: 36.2 °C (97.2 °F)   O2 sat % room air: 96 %                 Physical Exam:   Appearance: well developed, well nourished, no acute distress.   Eyes: PERRL, EOM intact, sclere white, conjunctiva moist.  Ears: no lesions or deformities.  Hearing: grossly intact.  Nose: no lesions or deformities.  Dentition: good dentition.   Oropharynx: tongue normal, posterior pharynx without erythema or exudate.  Neck: supple, trachea midline, no masses.  Respiratory effort: no intercostal retractions or use of accessory muscles.  Lung auscultation: Bilateral diminished   Heart auscultation: no murmur, rub, or gallop   Extremities: no cyanosis or edema.  Abdomen: soft, non-tender, no masses.  Gait and station: without difficulty   Digits and Nails: no clubbing, cyanosis, petechiae, or nodes.  Cranial nerves: grossly normal.  Motor: no focal deficits observed.   Skin: no rashes, lesions, or ulcers noted.  Orientation: oriented to time, place, and person.  Mood and affect: mood and affect appropriate, normal interaction with examiner.      Assessment   1. Chronic obstructive pulmonary disease, unspecified COPD type (HCC)  CT-CHEST (THORAX) W/O   2. Pulmonary nodules  CT-CHEST (THORAX) W/O   3. Tobacco use     4. Tobacco abuse counseling         Patient was seen for 25 minutes, more than 50% of time spent in face to face review, counseling, and arranging future evaluation and follow up of medical conditions and care.     PLAN:   Patient Instructions   1) Start Anoro 1 puff, once a day - sample sent  2) Continue rescue inhaler as needed  3) Smoking cessation discussed  4) Prednisone taper and zpack for emergency   5) Vaccines: Up to date with Prevnar 13, Pneumovax 23  6) Encourage light conditioning  7) Return in about 2 months (around 7/16/2018) for follow up with Alexa" MATHIEU SaucedaN, if not sooner, review of symptoms.

## 2018-06-18 RX ORDER — ALBUTEROL SULFATE 90 UG/1
2 AEROSOL, METERED RESPIRATORY (INHALATION) EVERY 4 HOURS PRN
Qty: 1 INHALER | Refills: 5 | Status: SHIPPED | OUTPATIENT
Start: 2018-06-18 | End: 2018-07-16

## 2018-06-18 NOTE — TELEPHONE ENCOUNTER
Have we ever prescribed this med? No.  If yes, what date?     Last OV: 05/16/2018 - Alexa Sauceda    Next OV: 07/16/2018 - Alexa Sauceda    DX: COPD    Medications: VENTOLIN

## 2018-07-16 ENCOUNTER — OFFICE VISIT (OUTPATIENT)
Dept: PULMONOLOGY | Facility: HOSPICE | Age: 68
End: 2018-07-16
Payer: MEDICARE

## 2018-07-16 VITALS
DIASTOLIC BLOOD PRESSURE: 80 MMHG | WEIGHT: 79 LBS | OXYGEN SATURATION: 93 % | HEIGHT: 58 IN | SYSTOLIC BLOOD PRESSURE: 116 MMHG | TEMPERATURE: 97.9 F | RESPIRATION RATE: 16 BRPM | BODY MASS INDEX: 16.58 KG/M2 | HEART RATE: 110 BPM

## 2018-07-16 DIAGNOSIS — Z72.0 TOBACCO USE: ICD-10-CM

## 2018-07-16 DIAGNOSIS — R91.8 ABNORMAL RADIOLOGIC FINDING OF LUNG FIELD: ICD-10-CM

## 2018-07-16 DIAGNOSIS — Z71.6 TOBACCO ABUSE COUNSELING: ICD-10-CM

## 2018-07-16 DIAGNOSIS — J44.9 CHRONIC OBSTRUCTIVE PULMONARY DISEASE, UNSPECIFIED COPD TYPE (HCC): ICD-10-CM

## 2018-07-16 DIAGNOSIS — R91.8 PULMONARY NODULES: ICD-10-CM

## 2018-07-16 PROCEDURE — 99213 OFFICE O/P EST LOW 20 MIN: CPT | Performed by: NURSE PRACTITIONER

## 2018-07-16 RX ORDER — ALBUTEROL SULFATE 90 UG/1
2 AEROSOL, METERED RESPIRATORY (INHALATION) EVERY 4 HOURS PRN
Qty: 1 INHALER | Refills: 5 | Status: SHIPPED | OUTPATIENT
Start: 2018-07-16 | End: 2018-10-31

## 2018-07-16 RX ORDER — TIOTROPIUM BROMIDE 18 UG/1
18 CAPSULE ORAL; RESPIRATORY (INHALATION) DAILY
COMMUNITY

## 2018-07-16 NOTE — PROGRESS NOTES
CC:  Here for f/u pulmonary issues as listed below    HPI:   Jewell presents today for follow up for COPD. PFTs from 11/2017 indicate a Fev1 of 1.85L or 98% predicted without bronchodilator response, Fev1/FVC ratio of 66, DLCO 104%.  Patient is a current smoker one pack per day, 47-pack-year history. She is trying to quit and has been going to the [facility] therapist with some benefit. She continues to decline smoking cessation program.      CAT scan of her chest completed September 1, 2017 showed stable 6 mm left lower lobe nodule, largest 4 mm nodule in the right upper lobe is now a linear area of scarring.Slight progression of a reticulonodular process in the lateral right upper lobe with increase in probable scarring and atelectasis in the medial segment right middle lobe and anterior left lower lobe. These areas are most consistent with inflammatory process.  Multiple other smaller nodules are similar to the prior study. There are no new suspicious nodules.  There is underlying emphysema and COPD. per the CAT scan results Dr. Hollis recommended bronchoscopy or CT biopsy given her results may be related to TRAY, or inflammation or neoplastic disease. She did not complete updated scan until 4/29/2018 showing decrease in prominence of reticular nodular opacifications laterally in right upper lobe.  Stability and 6 mm noncalcified pulmonary nodule in left lower lobe.  Unchanged scattered smaller peripheral pulmonary nodules.  Review CAT scan with Dr. Hagan who recommends yearly CAT scan, due April 2019.      She is taking Spiriva daily and rescue inhaler 1-2x/day.  She could not twist the Stiolto and was started on Anoro, but did not pick it up. Reviewed the difference between rescue and maintenance.  She finds that her breathing is stable since last office visit.  The heat causes an increase in dyspnea and chest tightness. She has allergies with nasal congestion and white mucus. They plan to start her back on  "allergy pill today. They recently found she has mold in her home and trying to keep her contained in a room that does not have it.  So far it has not been bothering her.             Patient Active Problem List    Diagnosis Date Noted   • Tobacco abuse counseling 05/16/2018   • Pulmonary nodules 05/16/2018   • Tobacco use 04/16/2018   • COPD (chronic obstructive pulmonary disease) ([facility]) 09/08/2017   • Abnormal radiologic finding of lung field 09/08/2017       Past Medical History:   Diagnosis Date   • Abnormal radiologic finding of lung field 9/8/2017    History of bilateral pulmonary nodules   • Asthma    • Back pain    • Chickenpox    • COPD (chronic obstructive pulmonary disease) ([facility]) 9/8/2017   • Fatigue    • Jamaican measles    • Influenza    • Mumps    • Polio    • Wears glasses        Past Surgical History:   Procedure Laterality Date   • CHOLECYSTECTOMY     • GASTROSTOMY     • PRIMARY C SECTION     • TONSILLECTOMY         Family History   Problem Relation Age of Onset   • Diabetes Mother    • Lung Cancer Father        Social History   Substance Use Topics   • Smoking status: Current Every Day Smoker     Packs/day: 1.00     Years: 47.00     Types: Cigarettes   • Smokeless tobacco: Never Used      Comment: \"working on cutting down as a family\"   • Alcohol use No       Current Outpatient Prescriptions   Medication Sig Dispense Refill   • tiotropium (SPIRIVA HANDIHALER) 18 MCG Cap Inhale 18 mcg by mouth every day.     • albuterol 108 (90 Base) MCG/ACT Aero Soln inhalation aerosol Inhale 2 Puffs by mouth every four hours as needed for Shortness of Breath. 1 Inhaler 5   • Fluticasone Furoate-Vilanterol (BREO ELLIPTA) 100-25 MCG/INH AEROSOL POWDER, BREATH ACTIVATED Inhale 1 Puff by mouth every day. Rinse mouth after use. 1 Each 5   • busPIRone (BUSPAR) 30 MG tablet TAKE ONE TABLET BY MOUTH TWICE PER DAY.  2   • methocarbamol (ROBAXIN) 750 MG Tab Take 750 mg by mouth 3 times a day.  0   • oxycodone-acetaminophen " "(PERCOCET)  MG Tab Take 3 Tabs by mouth every four hours as needed for Severe Pain.     • Polyethylene Glycol 3350 (MIRALAX PO) Take  by mouth as needed.     • Calcium Carb-Cholecalciferol (CALCIUM 600 + D PO) Take 1 tablet by mouth every day.     • amlodipine (NORVASC) 5 MG Tab Take 5 mg by mouth every day.     • ranitidine (ZANTAC) 150 MG Tab Take 150 mg by mouth 2 times a day.     • loratadine (CLARITIN) 10 MG Tab Take 10 mg by mouth every day.  11   • Oxycodone-Acetaminophen (PERCOCET-10)  MG Tab Take 3 Tabs by mouth every day.     • fluticasone (FLONASE) 50 MCG/ACT nasal spray Spray 1 Spray in nose every day.     • hydrocodone/acetaminophen (NORCO)  MG Tab Take 1-2 Tabs by mouth every 6 hours as needed.     • Methocarbamol (ROBAXIN-750 PO) Take 1 tablet by mouth 3 times a day.       No current facility-administered medications for this visit.           Allergies: Tape; Ambien [zolpidem]; and Augmentin          ROS   Gen: Denies fever, chills, unintentional weight loss, fatigue, night sweats  E/N/T: Denies nasal congestion, ear pain  Resp:Denies hemoptysis  CV: Denies chest pain, chest tightness, palpitations  Sleep:Denies morning headache  Neuro: Denies frequent headaches, weakness, dizziness  GI: Denies acid reflux, N/V  See HPI.  All other systems reviewed and negative          Vital signs for this encounter:  Vitals:    07/16/18 0901   Height: 1.473 m (4' 10\")   Weight: 35.8 kg (79 lb)   Weight % change since last entry.: 0 %   BP: 116/80   Pulse: (!) 110   BMI (Calculated): 16.51   Resp: 16   Temp: 36.6 °C (97.9 °F)   O2 sat % room air: 93 %                 Physical Exam:   Appearance: well developed, well nourished, no acute distress.   Eyes: PERRL, EOM intact, sclere white, conjunctiva moist.  Ears: no lesions or deformities.  Hearing: grossly intact.  Nose: no lesions or deformities.  Dentition: good dentition.   Oropharynx: tongue normal, posterior pharynx without erythema or " exudate.  Neck: supple, trachea midline, no masses.  Respiratory effort: no intercostal retractions or use of accessory muscles.  Lung auscultation: Bilateral diminished   Heart auscultation: no murmur, rub, or gallop   Extremities: no cyanosis or edema.  Abdomen: soft, non-tender, no masses.  Gait and station: without difficulty   Digits and Nails: no clubbing, cyanosis, petechiae, or nodes.  Cranial nerves: grossly normal.  Motor: no focal deficits observed.   Skin: no rashes, lesions, or ulcers noted.  Orientation: oriented to time, place, and person.  Mood and affect: mood and affect appropriate, normal interaction with examiner.      Assessment   1. Chronic obstructive pulmonary disease, unspecified COPD type (HCC)     2. Pulmonary nodules     3. Tobacco abuse counseling     4. Tobacco use     5. Abnormal radiologic finding of lung field         Patient was seen for 25 minutes, more than 50% of time spent in face to face review, counseling, and arranging future evaluation and follow up of medical conditions and care relating to COPD, medication changes and mandagement. Patient is clinically stable and will have the following changes per plan.     PLAN:   Patient Instructions   1) Start Breo 1 puff, once a day with mouth rinse  2) Continue Spiriva daily  3) Continue rescue inhaler as needed  4) Smoking cessation discussed  5) Prednisone taper and zpack on hand for emergency   6) Vaccines: Up to date with Prevnar 13, Pneumovax 23  7) Encourage light conditioning  8) Updated CT due April 2019  9) Return in about 2 months (around 9/16/2018) for follow up with ROBEL Robles, review of symptoms, if not sooner. for follow up with ROBEL Robles, review of symptoms, if not sooner.

## 2018-07-16 NOTE — PATIENT INSTRUCTIONS
1) Start Breo 1 puff, once a day with mouth rinse  2) Continue Spiriva daily  3) Continue rescue inhaler as needed  4) Smoking cessation discussed  5) Prednisone taper and zpack on hand for emergency   6) Vaccines: Up to date with Prevnar 13, Pneumovax 23  7) Encourage light conditioning  8) Updated CT due April 2019  9) Return in about 2 months (around 9/16/2018) for follow up with ROBEL Robles, review of symptoms, if not sooner.

## 2018-08-19 ENCOUNTER — OFFICE VISIT (OUTPATIENT)
Dept: URGENT CARE | Facility: PHYSICIAN GROUP | Age: 68
End: 2018-08-19
Payer: MEDICARE

## 2018-08-19 VITALS
BODY MASS INDEX: 15.95 KG/M2 | SYSTOLIC BLOOD PRESSURE: 136 MMHG | TEMPERATURE: 98.5 F | HEIGHT: 58 IN | WEIGHT: 76 LBS | RESPIRATION RATE: 18 BRPM | HEART RATE: 99 BPM | OXYGEN SATURATION: 96 % | DIASTOLIC BLOOD PRESSURE: 74 MMHG

## 2018-08-19 DIAGNOSIS — S70.01XA CONTUSION OF RIGHT HIP, INITIAL ENCOUNTER: ICD-10-CM

## 2018-08-19 DIAGNOSIS — Z76.5 DRUG-SEEKING BEHAVIOR: ICD-10-CM

## 2018-08-19 DIAGNOSIS — S40.011A CONTUSION OF RIGHT SHOULDER, INITIAL ENCOUNTER: ICD-10-CM

## 2018-08-19 PROCEDURE — 99202 OFFICE O/P NEW SF 15 MIN: CPT | Performed by: NURSE PRACTITIONER

## 2018-08-19 ASSESSMENT — ENCOUNTER SYMPTOMS
NECK PAIN: 1
SENSORY CHANGE: 0
HEADACHES: 0
CHILLS: 0
FEVER: 0
TINGLING: 0
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
## 2018-09-19 ENCOUNTER — APPOINTMENT (OUTPATIENT)
Dept: PULMONOLOGY | Facility: HOSPICE | Age: 68
End: 2018-09-19
Payer: MEDICARE

## 2018-10-30 ENCOUNTER — OFFICE VISIT (OUTPATIENT)
Dept: PHYSICAL MEDICINE AND REHAB | Facility: MEDICAL CENTER | Age: 68
End: 2018-10-30
Payer: MEDICARE

## 2018-10-30 VITALS
HEART RATE: 100 BPM | BODY MASS INDEX: 16.61 KG/M2 | HEIGHT: 58 IN | OXYGEN SATURATION: 92 % | DIASTOLIC BLOOD PRESSURE: 80 MMHG | SYSTOLIC BLOOD PRESSURE: 110 MMHG | WEIGHT: 79.14 LBS | TEMPERATURE: 97.9 F

## 2018-10-30 DIAGNOSIS — E55.9 VITAMIN D DEFICIENCY: ICD-10-CM

## 2018-10-30 DIAGNOSIS — M41.25 OTHER IDIOPATHIC SCOLIOSIS, THORACOLUMBAR REGION: ICD-10-CM

## 2018-10-30 DIAGNOSIS — G89.4 CHRONIC PAIN SYNDROME: ICD-10-CM

## 2018-10-30 DIAGNOSIS — D64.9 ANEMIA, UNSPECIFIED TYPE: ICD-10-CM

## 2018-10-30 DIAGNOSIS — M54.5 MIDLINE LOW BACK PAIN, UNSPECIFIED CHRONICITY, WITH SCIATICA PRESENCE UNSPECIFIED: ICD-10-CM

## 2018-10-30 DIAGNOSIS — M25.511 RIGHT SHOULDER PAIN, UNSPECIFIED CHRONICITY: ICD-10-CM

## 2018-10-30 DIAGNOSIS — G14 POST-POLIO SYNDROME: ICD-10-CM

## 2018-10-30 DIAGNOSIS — M54.2 CERVICALGIA: ICD-10-CM

## 2018-10-30 PROCEDURE — 99204 OFFICE O/P NEW MOD 45 MIN: CPT | Performed by: PHYSICAL MEDICINE & REHABILITATION

## 2018-10-30 ASSESSMENT — PATIENT HEALTH QUESTIONNAIRE - PHQ9
CLINICAL INTERPRETATION OF PHQ2 SCORE: 4
5. POOR APPETITE OR OVEREATING: 0 - NOT AT ALL
SUM OF ALL RESPONSES TO PHQ QUESTIONS 1-9: 7

## 2018-10-30 ASSESSMENT — PAIN SCALES - GENERAL: PAINLEVEL: 8=MODERATE-SEVERE PAIN

## 2018-10-30 NOTE — PROGRESS NOTES
New patient note    Physiatry (physical medicine and  Rehabilitation), interventional spine and sports medicine    Date of Service: 10/30/2018    Chief complaint: Chronic pain    HISTORY    HPI: Jewell Skelton 68 y.o. female who presents today for evaluation of pain.  She has a history of post-polio.  She would like to have pain medications for her pain.  She reports that she has been having more pain in her back.     She has had low back pain.  In the past, she had injections in her back and this seemed like these helped with some of these symptoms for a while.  She was seeing Marilu Barajas at [facility].  It sounds like she has had problems with her medications and there were some issues with U/A not showing her pain medications.    She has had ablations on both sides and SI joint injections as well.  The last ablation lasted about 4 months.    Her symptoms are worsening and she has some fatigue.  Going to the pool has been helpful in the past.    In the past, she was taking robaxin and percocet for pain.  It has been about 4 months without the percocet.  She has been using kratom for her pain.  This has helped her somewhat, but is not as effective as the percocet.  Without her pain medication, she is not able to be as active and spends more time sitting on the couch.    Her pain is relieved by sitting.    She smokes about 1/2 PPD and has been trying to quit.    Her diet is pretty limited to potatoes and eggs, but she does eat some vegetables at least once a day.    Medical records review:  I reviewed the note from the referring provider Margy Urbina P.A.    Previous treatments:    Physical Therapy: Yes, plateau was hit.    Medications the patient is tried: Narcotics, tylenol, muscle relaxants      Previous interventions: yes    Previous surgeries to relieve the above pain:  none      ROS:   Eyes: Wears glasses  ENT: history of post-nasal drip  Resp:COPD  GI:h/o ulcers, acid reflux, Hep  "C  Skin: skin cancer  Heme: history of bleeding    Red Flags ROS:   Fever, Chills, Sweats: Denies  Involuntary Weight Loss: Denies  Bladder Incontinence: Denies  Bowel Incontinence: denies  Saddle Anesthesia: Denies    All other systems reviewed and negative.       PMHx:   Past Medical History:   Diagnosis Date   • Abnormal radiologic finding of lung field 9/8/2017    History of bilateral pulmonary nodules   • Anesthesia     patient's mom had side effects   • Arthritis 10/31/2018    \"everywhere\"   • Asthma    • Back pain    • Breath shortness    • Cancer (HCC) 2013    squamous cell   • Chickenpox    • COPD (chronic obstructive pulmonary disease) (HCC) 9/8/2017   • Dental disorder 10/31/2018    upper   • Emphysema of lung (HCC)    • Fatigue    • Uzbek measles    • Heart burn    • Hepatitis C     dx 10/2018   • Hypertension 10/31/2018   • Influenza    • Mumps    • Polio    • Seizure (HCC)     \"possibly 1 time in 2000\"   • Wears glasses        PSHx:   Past Surgical History:   Procedure Laterality Date   • CHOLECYSTECTOMY     • GASTROSTOMY     • OTHER ORTHOPEDIC SURGERY      Left ankle fusion, right foot surgery   • PRIMARY C SECTION      hysterectomy 1995   • TONSILLECTOMY         Family history   Family History   Problem Relation Age of Onset   • Diabetes Mother    • Lung Cancer Father          Medications:   Outpatient Prescriptions Marked as Taking for the 10/30/18 encounter (Office Visit) with Neftali Painter M.D.   Medication Sig Dispense Refill   • [DISCONTINUED] Fluticasone Furoate-Vilanterol (BREO ELLIPTA) 100-25 MCG/INH AEROSOL POWDER, BREATH ACTIVATED Inhale 1 Puff by mouth.     • tiotropium (SPIRIVA HANDIHALER) 18 MCG Cap Inhale 18 mcg by mouth every day.     • [DISCONTINUED] albuterol 108 (90 Base) MCG/ACT Aero Soln inhalation aerosol Inhale 2 Puffs by mouth every four hours as needed for Shortness of Breath. 1 Inhaler 5   • busPIRone (BUSPAR) 30 MG tablet TAKE ONE TABLET BY MOUTH TWICE PER DAY.  2   • " "Calcium Carb-Cholecalciferol (CALCIUM 600 + D PO) Take 1 tablet by mouth every day.     • amlodipine (NORVASC) 5 MG Tab Take 5 mg by mouth every day.     • ranitidine (ZANTAC) 150 MG Tab Take 150 mg by mouth 2 times a day.     Amlodipine is 7.5mg      Allergies:   Allergies   Allergen Reactions   • Tape Unspecified     Rips off epidermis   • Ambien [Zolpidem] Unspecified     Memory loss   • Augmentin Nausea   • Bloodless      protocal per patient request; MD notified       Social Hx:   Social History     Social History   • Marital status:      Spouse name: N/A   • Number of children: N/A   • Years of education: N/A     Occupational History   • Not on file.     Social History Main Topics   • Smoking status: Current Every Day Smoker     Packs/day: 0.50     Years: 47.00     Types: Cigarettes   • Smokeless tobacco: Never Used      Comment: \"working on cutting down as a family\"   • Alcohol use No   • Drug use: No   • Sexual activity: Not on file     Other Topics Concern   •  Service No   • Blood Transfusions No   • Caffeine Concern No   • Occupational Exposure No   • Hobby Hazards No   • Sleep Concern No   • Stress Concern Yes   • Weight Concern No   • Special Diet No   • Back Care No   • Exercise Yes   • Bike Helmet No   • Seat Belt Yes   • Self-Exams Yes     Social History Narrative   • No narrative on file         EXAMINATION     Physical Exam:   Vitals: Blood pressure 110/80, pulse 100, temperature 36.6 °C (97.9 °F), temperature source Temporal, height 1.473 m (4' 10\"), weight 35.9 kg (79 lb 2.3 oz), SpO2 92 %.    Constitutional:   Body Habitus: Body mass index is 16.54 kg/m².  Cooperation: Fully cooperates with exam  Appearance: Well-groomed, very thin, not disheveled, in no acute distress    Eyes: No scleral icterus, no proptosis     ENT -no obvious auditory deficits, no obvious tongue lesions, tongue midline, no facial droop     Skin -no rashes or lesions noted     Respiratory-  breathing " comfortable on room air, no audible wheezing    Cardiovascular- capillary refills less than 2 seconds. No lower extremity edema is noted.     Gastrointestinal - no obvious abdominal masses, No tenderness to palpation in the abdomen    Psychiatric- alert and oriented ×3. Normal affect.     Gait - Abnormal gait, left KAFO with no motion at the ankle or knee during gait, no loss of balance.  She is unable to attempt heel or toe walking.  She uses crutches for longer distances    Musculoskeletal -   Cervical spine   Inspection: No deformities of the skin over the cervical spine. No rashes or lesions.    full  A/P ROM in all directions, without pain      Spurling’s sign: negative bilaterally    Decreased ROM of the right shoulder to about 90 degrees AROM, crepitus    No signs of muscular atrophy in bilateral upper extremities, patient is very thing    Thoracic/Lumbar Spine/Sacral Spine/Hips   Inspection: Significant atrophy in bilateral lower extremities throughout   ROM: decreased AROM with flexion, extension, lateral flexion, and rotation bilaterally, without pain, prominent thoracic paraspinals on the right    Palpation:   No tenderness to palpation in midline at T1-T12 levels. No tenderness to palpation in the left and right of the midline T1-L5  palpation over SI joint: negative bilaterally    palpation over buttock: negative bilaterally    palpation in hip or over the greater trochanters: negative bilaterally      Lumbar spine Special tests  Neuro tension  Straight leg test negative bilaterally      HIP  Symmetric hip ROM in internal and external rotation bilaterally    SI joint tests  Observation patient sits on one buttocks: Negative   GHASSAN test negative bilaterally       Neuro       Key points for the international standards for neurological classification of spinal cord injury (ISNCSCI) to light touch.     Dermatome R L   C4 2 2   C5 2 2   C6 2 2   C7 2 2   C8 2 2   T1 2 2   T2 2 2   L2 2 2   L3 2 2   L4 2 2    L5 2 2   S1 2 2   S2 2 2           Motor Exam Upper Extremities   ? Myotome R L   Shoulder flexion C5 4 4   Elbow flexion C5 4 4   Wrist extension C6 4 4   Elbow extension C7 4 4   Finger flexion C8 4 4   Finger abduction T1 4 4         Motor Exam Lower Extremities    ? Myotome R L   Hip flexion L2 4 3   Knee extension L3 4 0   Ankle dorsiflexion L4 4+ 0   Toe extension L5 3 0   Ankle plantarflexion S1 4+ 0       Kline’s sign negative bilaterally   Babinski sign negative bilaterally   Clonus of the ankle negative bilaterally     Reflexes  ?  R L   Biceps  1+ 1+   Brachioradialis  1+ 1+   Patella  0 0   Achilles   0 0       MEDICAL DECISION MAKING    Medical records review: see under HPI section.     DATA    Labs:   No results found for: SODIUM, POTASSIUM, CHLORIDE, CO2, ANION, GLUCOSE, BUN, CREATININE, CALCIUM, ASTSGOT, ALTSGPT, TBILIRUBIN, ALBUMIN, TOTPROTEIN, GLOBULIN, AGRATIO]    No results found for: PROTHROMBTM, INR     No results found for: WBC, RBC, HEMOGLOBIN, HEMATOCRIT, MCV, MCH, MCHC, MPV, NEUTSPOLYS, LYMPHOCYTES, MONOCYTES, EOSINOPHILS, BASOPHILS, HYPOCHROMIA, ANISOCYTOSIS     No results found for: HBA1C     Imaging:No imaging studies to review                                                                                                                                                                              Diagnosis   Visit Diagnoses     ICD-10-CM   1. Other idiopathic scoliosis, thoracolumbar region M41.25   2. Post-polio syndrome G14   3. Chronic pain syndrome G89.4   4. Vitamin D deficiency E55.9   5. Midline low back pain, unspecified chronicity, with sciatica presence unspecified M54.5   6. Cervicalgia M54.2   7. Right shoulder pain, unspecified chronicity M25.511   8. Anemia, unspecified type D64.9           ASSESSMENT:  Jewell Skelton 68 y.o. female with history of polio      Jewell was seen today for new patient.    Diagnoses and all orders for this visit:    Other idiopathic scoliosis,  thoracolumbar region  -     DX-LUMBAR SPINE-2 OR 3 VIEWS; Future    Post-polio syndrome    Chronic pain syndrome  -     COMP METABOLIC PANEL  -     CBC WITH DIFFERENTIAL; Future  -     MILLENNIUM PAIN MANAGEMENT SCREEN; Future    Vitamin D deficiency  -     VITAMIN D,25 HYDROXY; Future    Midline low back pain, unspecified chronicity, with sciatica presence unspecified  -     DX-LUMBAR SPINE-2 OR 3 VIEWS; Future    Cervicalgia  -     DX-CERVICAL SPINE-2 OR 3 VIEWS; Future    Right shoulder pain, unspecified chronicity  -     DX-SHOULDER 2+ RIGHT; Future    Anemia, unspecified type  -     CBC WITH DIFFERENTIAL; Future    Plan to recheck labs and imaging as above.  We discussed requesting her outside records. Plans for surgery wi Dr. Centeno upcoming.    UDS requested.  Requested old records.    Discussed that I would consider possible use of butrans (buprenorphine) patch, but need to have records and work-up.  She is a high risk based on the ORT at 15.  This and the previous history that she and her daughter report, suggest that use of other medications for pain might not be without significant risk of abuse or diversion.  She has had chronic opioid use and our goals would have to include functional goals.      Outside records requested:  The patient signed outside records request form for her outside records including outside images.      Follow-up:4 weeks    Total time: 60 minutes face-to-face time. I spent greater than 50% of the time for patient care and coordination on this date, including with the patient as per assessment and plan above.         Please note that this dictation was created using voice recognition software. I have made every reasonable attempt to correct obvious errors but there may be errors of grammar and content that I may have overlooked prior to finalization of this note.      Neftali Painter MD  Physical Medicine and Rehabilitation  Interventional Spine and Sports Physiatry  [facility]           CC Margy Urbina P.A.

## 2018-10-31 ENCOUNTER — APPOINTMENT (OUTPATIENT)
Dept: ADMISSIONS | Facility: MEDICAL CENTER | Age: 68
End: 2018-10-31
Attending: ORTHOPAEDIC SURGERY
Payer: MEDICARE

## 2018-10-31 DIAGNOSIS — Z01.810 PRE-OPERATIVE CARDIOVASCULAR EXAMINATION: ICD-10-CM

## 2018-10-31 RX ORDER — ACETAMINOPHEN 325 MG/1
650 TABLET ORAL EVERY 4 HOURS PRN
COMMUNITY

## 2018-11-01 LAB — EKG IMPRESSION: NORMAL

## 2018-11-02 ENCOUNTER — HOSPITAL ENCOUNTER (OUTPATIENT)
Facility: MEDICAL CENTER | Age: 68
End: 2018-11-02
Attending: ORTHOPAEDIC SURGERY | Admitting: ORTHOPAEDIC SURGERY
Payer: MEDICARE

## 2018-11-02 VITALS
OXYGEN SATURATION: 93 % | DIASTOLIC BLOOD PRESSURE: 72 MMHG | HEART RATE: 113 BPM | HEIGHT: 58 IN | SYSTOLIC BLOOD PRESSURE: 137 MMHG | TEMPERATURE: 98.3 F | RESPIRATION RATE: 20 BRPM | WEIGHT: 77.82 LBS | BODY MASS INDEX: 16.34 KG/M2

## 2018-11-02 LAB
ALBUMIN SERPL BCP-MCNC: 3.9 G/DL (ref 3.2–4.9)
ALBUMIN/GLOB SERPL: 1.3 G/DL
ALP SERPL-CCNC: 58 U/L (ref 30–99)
ALT SERPL-CCNC: 12 U/L (ref 2–50)
ANION GAP SERPL CALC-SCNC: 6 MMOL/L (ref 0–11.9)
AST SERPL-CCNC: 19 U/L (ref 12–45)
BILIRUB SERPL-MCNC: 0.3 MG/DL (ref 0.1–1.5)
BUN SERPL-MCNC: 8 MG/DL (ref 8–22)
CALCIUM SERPL-MCNC: 9 MG/DL (ref 8.5–10.5)
CHLORIDE SERPL-SCNC: 100 MMOL/L (ref 96–112)
CO2 SERPL-SCNC: 24 MMOL/L (ref 20–33)
CREAT SERPL-MCNC: 0.25 MG/DL (ref 0.5–1.4)
ERYTHROCYTE [DISTWIDTH] IN BLOOD BY AUTOMATED COUNT: 53 FL (ref 35.9–50)
GLOBULIN SER CALC-MCNC: 2.9 G/DL (ref 1.9–3.5)
GLUCOSE SERPL-MCNC: 92 MG/DL (ref 65–99)
HCT VFR BLD AUTO: 37.4 % (ref 37–47)
HGB BLD-MCNC: 12.6 G/DL (ref 12–16)
MCH RBC QN AUTO: 31.7 PG (ref 27–33)
MCHC RBC AUTO-ENTMCNC: 33.7 G/DL (ref 33.6–35)
MCV RBC AUTO: 94 FL (ref 81.4–97.8)
PLATELET # BLD AUTO: 303 K/UL (ref 164–446)
PMV BLD AUTO: 9.2 FL (ref 9–12.9)
POTASSIUM SERPL-SCNC: 3.8 MMOL/L (ref 3.6–5.5)
PROT SERPL-MCNC: 6.8 G/DL (ref 6–8.2)
RBC # BLD AUTO: 3.98 M/UL (ref 4.2–5.4)
SODIUM SERPL-SCNC: 130 MMOL/L (ref 135–145)
WBC # BLD AUTO: 7 K/UL (ref 4.8–10.8)

## 2018-11-02 PROCEDURE — C1713 ANCHOR/SCREW BN/BN,TIS/BN: HCPCS | Performed by: ORTHOPAEDIC SURGERY

## 2018-11-02 PROCEDURE — 160028 HCHG SURGERY MINUTES - 1ST 30 MINS LEVEL 3: Performed by: ORTHOPAEDIC SURGERY

## 2018-11-02 PROCEDURE — 700101 HCHG RX REV CODE 250

## 2018-11-02 PROCEDURE — 85027 COMPLETE CBC AUTOMATED: CPT

## 2018-11-02 PROCEDURE — 500881 HCHG PACK, EXTREMITY: Performed by: ORTHOPAEDIC SURGERY

## 2018-11-02 PROCEDURE — A6223 GAUZE >16<=48 NO W/SAL W/O B: HCPCS | Performed by: ORTHOPAEDIC SURGERY

## 2018-11-02 PROCEDURE — 160002 HCHG RECOVERY MINUTES (STAT): Performed by: ORTHOPAEDIC SURGERY

## 2018-11-02 PROCEDURE — 160046 HCHG PACU - 1ST 60 MINS PHASE II: Performed by: ORTHOPAEDIC SURGERY

## 2018-11-02 PROCEDURE — 501838 HCHG SUTURE GENERAL: Performed by: ORTHOPAEDIC SURGERY

## 2018-11-02 PROCEDURE — 160035 HCHG PACU - 1ST 60 MINS PHASE I: Performed by: ORTHOPAEDIC SURGERY

## 2018-11-02 PROCEDURE — 700111 HCHG RX REV CODE 636 W/ 250 OVERRIDE (IP)

## 2018-11-02 PROCEDURE — 160009 HCHG ANES TIME/MIN: Performed by: ORTHOPAEDIC SURGERY

## 2018-11-02 PROCEDURE — A9270 NON-COVERED ITEM OR SERVICE: HCPCS | Performed by: ANESTHESIOLOGY

## 2018-11-02 PROCEDURE — 36415 COLL VENOUS BLD VENIPUNCTURE: CPT

## 2018-11-02 PROCEDURE — 80053 COMPREHEN METABOLIC PANEL: CPT

## 2018-11-02 PROCEDURE — 160048 HCHG OR STATISTICAL LEVEL 1-5: Performed by: ORTHOPAEDIC SURGERY

## 2018-11-02 PROCEDURE — 160025 RECOVERY II MINUTES (STATS): Performed by: ORTHOPAEDIC SURGERY

## 2018-11-02 PROCEDURE — 700102 HCHG RX REV CODE 250 W/ 637 OVERRIDE(OP): Performed by: ANESTHESIOLOGY

## 2018-11-02 PROCEDURE — 160039 HCHG SURGERY MINUTES - EA ADDL 1 MIN LEVEL 3: Performed by: ORTHOPAEDIC SURGERY

## 2018-11-02 DEVICE — WIRE K- SMTH .045 4 (6TX6=36) (6EA/PK): Type: IMPLANTABLE DEVICE | Status: FUNCTIONAL

## 2018-11-02 RX ORDER — OXYCODONE HYDROCHLORIDE 5 MG/1
5 TABLET ORAL
Status: DISCONTINUED | OUTPATIENT
Start: 2018-11-02 | End: 2018-11-02 | Stop reason: HOSPADM

## 2018-11-02 RX ORDER — HYDROMORPHONE HYDROCHLORIDE 1 MG/ML
0.4 INJECTION, SOLUTION INTRAMUSCULAR; INTRAVENOUS; SUBCUTANEOUS
Status: DISCONTINUED | OUTPATIENT
Start: 2018-11-02 | End: 2018-11-02 | Stop reason: HOSPADM

## 2018-11-02 RX ORDER — SODIUM CHLORIDE, SODIUM LACTATE, POTASSIUM CHLORIDE, CALCIUM CHLORIDE 600; 310; 30; 20 MG/100ML; MG/100ML; MG/100ML; MG/100ML
INJECTION, SOLUTION INTRAVENOUS CONTINUOUS
Status: DISCONTINUED | OUTPATIENT
Start: 2018-11-02 | End: 2018-11-02 | Stop reason: HOSPADM

## 2018-11-02 RX ORDER — OXYCODONE HYDROCHLORIDE 5 MG/1
10 TABLET ORAL
Status: DISCONTINUED | OUTPATIENT
Start: 2018-11-02 | End: 2018-11-02 | Stop reason: HOSPADM

## 2018-11-02 RX ORDER — HALOPERIDOL 5 MG/ML
1 INJECTION INTRAMUSCULAR
Status: DISCONTINUED | OUTPATIENT
Start: 2018-11-02 | End: 2018-11-02 | Stop reason: HOSPADM

## 2018-11-02 RX ORDER — DIPHENHYDRAMINE HYDROCHLORIDE 50 MG/ML
12.5 INJECTION INTRAMUSCULAR; INTRAVENOUS
Status: DISCONTINUED | OUTPATIENT
Start: 2018-11-02 | End: 2018-11-02 | Stop reason: HOSPADM

## 2018-11-02 RX ORDER — MEPERIDINE HYDROCHLORIDE 25 MG/ML
12.5 INJECTION INTRAMUSCULAR; INTRAVENOUS; SUBCUTANEOUS
Status: DISCONTINUED | OUTPATIENT
Start: 2018-11-02 | End: 2018-11-02 | Stop reason: HOSPADM

## 2018-11-02 RX ORDER — OXYCODONE HCL 5 MG/5 ML
5 SOLUTION, ORAL ORAL
Status: COMPLETED | OUTPATIENT
Start: 2018-11-02 | End: 2018-11-02

## 2018-11-02 RX ORDER — SODIUM CHLORIDE, SODIUM LACTATE, POTASSIUM CHLORIDE, CALCIUM CHLORIDE 600; 310; 30; 20 MG/100ML; MG/100ML; MG/100ML; MG/100ML
INJECTION, SOLUTION INTRAVENOUS ONCE
Status: COMPLETED | OUTPATIENT
Start: 2018-11-02 | End: 2018-11-02

## 2018-11-02 RX ORDER — BACITRACIN 50000 [IU]/1
INJECTION, POWDER, FOR SOLUTION INTRAMUSCULAR
Status: DISCONTINUED | OUTPATIENT
Start: 2018-11-02 | End: 2018-11-02 | Stop reason: HOSPADM

## 2018-11-02 RX ORDER — OXYCODONE HCL 5 MG/5 ML
10 SOLUTION, ORAL ORAL
Status: COMPLETED | OUTPATIENT
Start: 2018-11-02 | End: 2018-11-02

## 2018-11-02 RX ORDER — ONDANSETRON 2 MG/ML
4 INJECTION INTRAMUSCULAR; INTRAVENOUS
Status: DISCONTINUED | OUTPATIENT
Start: 2018-11-02 | End: 2018-11-02 | Stop reason: HOSPADM

## 2018-11-02 RX ORDER — HYDROMORPHONE HYDROCHLORIDE 1 MG/ML
0.2 INJECTION, SOLUTION INTRAMUSCULAR; INTRAVENOUS; SUBCUTANEOUS
Status: DISCONTINUED | OUTPATIENT
Start: 2018-11-02 | End: 2018-11-02 | Stop reason: HOSPADM

## 2018-11-02 RX ORDER — HYDROMORPHONE HYDROCHLORIDE 1 MG/ML
0.1 INJECTION, SOLUTION INTRAMUSCULAR; INTRAVENOUS; SUBCUTANEOUS
Status: DISCONTINUED | OUTPATIENT
Start: 2018-11-02 | End: 2018-11-02 | Stop reason: HOSPADM

## 2018-11-02 RX ORDER — LABETALOL HYDROCHLORIDE 5 MG/ML
5 INJECTION, SOLUTION INTRAVENOUS
Status: DISCONTINUED | OUTPATIENT
Start: 2018-11-02 | End: 2018-11-02 | Stop reason: HOSPADM

## 2018-11-02 RX ADMIN — SODIUM CHLORIDE, SODIUM LACTATE, POTASSIUM CHLORIDE, CALCIUM CHLORIDE 1000 ML: 600; 310; 30; 20 INJECTION, SOLUTION INTRAVENOUS at 12:02

## 2018-11-02 RX ADMIN — OXYCODONE HYDROCHLORIDE 5 MG: 5 SOLUTION ORAL at 13:35

## 2018-11-02 ASSESSMENT — PAIN SCALES - GENERAL
PAINLEVEL_OUTOF10: 6

## 2018-11-02 NOTE — PROGRESS NOTES
1319- to pacu connected to all monitoring equip report recvd from dr zambrano and rn pt awake at comfortable   l arm elevated up on to pillow fingers warm pink dry with good cap refill some n/t present from block   1339- medicated with po  Pain meds and placed on to bedpan per pt request   1352-able to void via bedpan lorena liquids without nausea 02 off sats 92% on room air at this time   Report to nazanin

## 2018-11-02 NOTE — DISCHARGE INSTRUCTIONS
ACTIVITY: Rest and take it easy for the first 24 hours.  A responsible adult is recommended to remain with you during that time.  It is normal to feel sleepy.  We encourage you to not do anything that requires balance, judgment or coordination.    MILD FLU-LIKE SYMPTOMS ARE NORMAL. YOU MAY EXPERIENCE GENERALIZED MUSCLE ACHES, THROAT IRRITATION, HEADACHE AND/OR SOME NAUSEA.    FOR 24 HOURS DO NOT:  Drive, operate machinery or run household appliances.  Drink beer or alcoholic beverages.   Make important decisions or sign legal documents.    SPECIAL INSTRUCTIONS: *Keep arm elevated and move fingers around**    DIET: To avoid nausea, slowly advance diet as tolerated, avoiding spicy or greasy foods for the first day.  Add more substantial food to your diet according to your physician's instructions.  Babies can be fed formula or breast milk as soon as they are hungry.  INCREASE FLUIDS AND FIBER TO AVOID CONSTIPATION.    SURGICAL DRESSING/BATHING: *Keep dressing clean and dry**    FOLLOW-UP APPOINTMENT:  A follow-up appointment should be arranged with your doctor in *call to schedule**.    You should CALL YOUR PHYSICIAN if you develop:  Fever greater than 101 degrees F.  Pain not relieved by medication, or persistent nausea or vomiting.  Excessive bleeding (blood soaking through dressing) or unexpected drainage from the wound.  Extreme redness or swelling around the incision site, drainage of pus or foul smelling drainage.  Inability to urinate or empty your bladder within 8 hours.  Problems with breathing or chest pain.    You should call 911 if you develop problems with breathing or chest pain.  If you are unable to contact your doctor or surgical center, you should go to the nearest emergency room or urgent care center.  Physician's telephone #: *Dr. Ritchie [phone]**    If any questions arise, call your doctor.  If your doctor is not available, please feel free to call the Surgical Center at [phone].   The Center is open Monday through Friday from 7AM to 7PM.  You can also call the HEALTH HOTLINE open 24 hours/day, 7 days/week and speak to a nurse at [phone], or toll free at [phone].    A registered nurse may call you a few days after your surgery to see how you are doing after your procedure.    MEDICATIONS: Resume taking daily medication.  Take prescribed pain medication with food.  If no medication is prescribed, you may take non-aspirin pain medication if needed.  PAIN MEDICATION CAN BE VERY CONSTIPATING.  Take a stool softener or laxative such as senokot, pericolace, or milk of magnesia if needed.    Prescription given for *patient has prescription**.  Last pain medication given at *1:30 pm; next dose at 5:30 pm**.    If your physician has prescribed pain medication that includes Acetaminophen (Tylenol), do not take additional Acetaminophen (Tylenol) while taking the prescribed medication.    Depression / Suicide Risk    As you are discharged from this [facility] facility, it is important to learn how to keep safe from harming yourself.    Recognize the warning signs:  · Abrupt changes in personality, positive or negative- including increase in energy   · Giving away possessions  · Change in eating patterns- significant weight changes-  positive or negative  · Change in sleeping patterns- unable to sleep or sleeping all the time   · Unwillingness or inability to communicate  · Depression  · Unusual sadness, discouragement and loneliness  · Talk of wanting to die  · Neglect of personal appearance   · Rebelliousness- reckless behavior  · Withdrawal from people/activities they love  · Confusion- inability to concentrate     If you or a loved one observes any of these behaviors or has concerns about self-harm, here's what you can do:  · Talk about it- your feelings and reasons for harming yourself  · Remove any means that you might use to hurt yourself (examples: pills, rope, extension cords,  firearm)  · Get professional help from the community (Mental Health, Substance Abuse, psychological counseling)  · Do not be alone:Call your Safe Contact- someone whom you trust who will be there for you.  · Call your local CRISIS HOTLINE 914-3596 or 261-605-5637  · Call your local Children's Mobile Crisis Response Team [place] (942) 660-8982 or www.[employer]  · Call the toll free National Suicide Prevention Hotlines   · National Suicide Prevention Lifeline 984-797-LGQX (3718)  · National Hope Line Network 800-SUICIDE (191-5181)

## 2018-11-02 NOTE — OR SURGEON
Immediate Post OP Note    PreOp Diagnosis: R th c-mc disloc    PostOp Diagnosis: same    Procedure(s):  FINGER ARTHROPLASTY - THUMB CARPOMETACARPAL FUSION VERSUS THUMB METACARPOPHALANGEAL FUSION, THUMB CARPOMETACARPAL EXCISIONAL ARTHROPLASTY, FLEXOR CARPI RADIALIS GRAFT - Wound Class: Clean    Surgeon(s):  Jerzy Ritchie M.D.    Anesthesiologist/Type of Anesthesia:  Anesthesiologist: Sabino Morley M.D./General    Surgical Staff:  Circulator: Giuliana Meyers R.N.  Scrub Person: Caitlyn Rodriguez    Specimens removed if any:  * No specimens in log *    Estimated Blood Loss: 0    Findings: 0    Complications: 0        11/2/2018 1:26 PM Jerzy Ritchie M.D.

## 2018-11-02 NOTE — OR NURSING
1352 Received report from Soila KAMARA, assumed care of pt at this time. Pt stable, resting comfortably in bed. Pt has cast to right hand, CDI, right arm elevated on pillow.     1430 Pt and Daughter given instructions for discharge, evidence of understanding demonstrated. Pt meets d/c criteria. Pt used bedpan x1.     1445 Pt dressed with assistance from Daughter. Pt.'s right arm in sling. Pt reports she is not able to move her right arm at this time r/t block.     1555 Slight skin tear happened to left arm as name band was being removed with scissors. Tegaderm applied, message left with Dr. Ritchie. Pt will f/u with physician if tear does not heal.     1500 Pt out to car in WC.

## 2018-11-05 NOTE — OP REPORT
DATE OF SERVICE:  11/02/2018    PREOPERATIVE DIAGNOSIS:  Chronic dislocation with posttraumatic arthritis,   right thumb basilar joint.    POSTOPERATIVE DIAGNOSIS:  Chronic dislocation with posttraumatic arthritis,   right thumb basilar joint with hyperextension deformity of the MP joint.    PROCEDURE:  Right thumb CMC arthrodesis.    ANESTHESIA:  General.    SURGEON:  Jerzy Ritchie MD    OPERATIVE PROCEDURE:  Patient was taken to the operating room following   induction of general anesthesia.  Right upper extremity was prepped and draped   in routine fashion.  Limb exsanguinated with an elastic bandage, tourniquet   inflated to 250 mmHg.  Right thumb was exposed through a dorsal longitudinal   incision.  Sharp dissection was carried down through the skin and subcutaneous   tissue.  Full thickness skin flaps were developed.  Subperiosteal dissection   was used to expose the base of the metacarpal and the trapezium.  The joint   was severely arthritic with severe erosion of the bone.  A sagittal saw was   used to square off the ends of the bone and I had good bony contact in ____   bone for good arthrodesis.  The thumb was held in reduced position, which left   it in an abducted and pronated position and arthrodesis was carried out with   2 smooth Trip wires and a tension band wire getting rigid fixation in a   functional position.  This was confirmed clinically.  The tourniquet was   released.  Hemostasis obtained with electrocautery.  Wound was irrigated   copiously.  Capsule closed with 2-0 Vicryl, skin with 4-0 nylon.  Compressive   dressing and splint applied.  The arm was elevated and the patient was taken   to the recovery room in satisfactory condition.       ____________________________________     MD JAI BETH / NTS    DD:  11/04/2018 17:06:35  DT:  11/04/2018 17:17:29    D#:  [id number]  Job#:  [id number]

## 2018-11-15 ENCOUNTER — HOSPITAL ENCOUNTER (OUTPATIENT)
Dept: LAB | Facility: MEDICAL CENTER | Age: 68
End: 2018-11-15
Attending: PHYSICAL MEDICINE & REHABILITATION
Payer: MEDICARE

## 2018-11-15 ENCOUNTER — HOSPITAL ENCOUNTER (OUTPATIENT)
Dept: RADIOLOGY | Facility: MEDICAL CENTER | Age: 68
End: 2018-11-15
Attending: PHYSICAL MEDICINE & REHABILITATION
Payer: MEDICARE

## 2018-11-15 DIAGNOSIS — G89.4 CHRONIC PAIN SYNDROME: ICD-10-CM

## 2018-11-15 DIAGNOSIS — M25.511 RIGHT SHOULDER PAIN, UNSPECIFIED CHRONICITY: ICD-10-CM

## 2018-11-15 DIAGNOSIS — D64.9 ANEMIA, UNSPECIFIED TYPE: ICD-10-CM

## 2018-11-15 DIAGNOSIS — E55.9 VITAMIN D DEFICIENCY: ICD-10-CM

## 2018-11-15 DIAGNOSIS — M41.25 OTHER IDIOPATHIC SCOLIOSIS, THORACOLUMBAR REGION: ICD-10-CM

## 2018-11-15 DIAGNOSIS — M54.5 MIDLINE LOW BACK PAIN, UNSPECIFIED CHRONICITY, WITH SCIATICA PRESENCE UNSPECIFIED: ICD-10-CM

## 2018-11-15 DIAGNOSIS — M54.2 CERVICALGIA: ICD-10-CM

## 2018-11-15 LAB
25(OH)D3 SERPL-MCNC: 34 NG/ML (ref 30–100)
BASOPHILS # BLD AUTO: 0.6 % (ref 0–1.8)
BASOPHILS # BLD: 0.07 K/UL (ref 0–0.12)
EOSINOPHIL # BLD AUTO: 0.03 K/UL (ref 0–0.51)
EOSINOPHIL NFR BLD: 0.3 % (ref 0–6.9)
ERYTHROCYTE [DISTWIDTH] IN BLOOD BY AUTOMATED COUNT: 55.1 FL (ref 35.9–50)
HCT VFR BLD AUTO: 45.9 % (ref 37–47)
HGB BLD-MCNC: 14.8 G/DL (ref 12–16)
IMM GRANULOCYTES # BLD AUTO: 0.04 K/UL (ref 0–0.11)
IMM GRANULOCYTES NFR BLD AUTO: 0.4 % (ref 0–0.9)
LYMPHOCYTES # BLD AUTO: 2.59 K/UL (ref 1–4.8)
LYMPHOCYTES NFR BLD: 23.4 % (ref 22–41)
MCH RBC QN AUTO: 31.2 PG (ref 27–33)
MCHC RBC AUTO-ENTMCNC: 32.2 G/DL (ref 33.6–35)
MCV RBC AUTO: 96.6 FL (ref 81.4–97.8)
MONOCYTES # BLD AUTO: 1.06 K/UL (ref 0–0.85)
MONOCYTES NFR BLD AUTO: 9.6 % (ref 0–13.4)
NEUTROPHILS # BLD AUTO: 7.26 K/UL (ref 2–7.15)
NEUTROPHILS NFR BLD: 65.7 % (ref 44–72)
NRBC # BLD AUTO: 0 K/UL
NRBC BLD-RTO: 0 /100 WBC
PLATELET # BLD AUTO: 494 K/UL (ref 164–446)
PMV BLD AUTO: 9.4 FL (ref 9–12.9)
RBC # BLD AUTO: 4.75 M/UL (ref 4.2–5.4)
WBC # BLD AUTO: 11.1 K/UL (ref 4.8–10.8)

## 2018-11-15 PROCEDURE — 72100 X-RAY EXAM L-S SPINE 2/3 VWS: CPT

## 2018-11-15 PROCEDURE — 85025 COMPLETE CBC W/AUTO DIFF WBC: CPT

## 2018-11-15 PROCEDURE — 36415 COLL VENOUS BLD VENIPUNCTURE: CPT

## 2018-11-15 PROCEDURE — 82306 VITAMIN D 25 HYDROXY: CPT

## 2018-11-15 PROCEDURE — 72040 X-RAY EXAM NECK SPINE 2-3 VW: CPT

## 2018-11-15 PROCEDURE — 73030 X-RAY EXAM OF SHOULDER: CPT | Mod: RT

## 2018-11-27 ENCOUNTER — OFFICE VISIT (OUTPATIENT)
Dept: PHYSICAL MEDICINE AND REHAB | Facility: MEDICAL CENTER | Age: 68
End: 2018-11-27
Payer: MEDICARE

## 2018-11-27 VITALS
WEIGHT: 79.59 LBS | SYSTOLIC BLOOD PRESSURE: 120 MMHG | HEIGHT: 58 IN | OXYGEN SATURATION: 95 % | DIASTOLIC BLOOD PRESSURE: 80 MMHG | TEMPERATURE: 97.2 F | BODY MASS INDEX: 16.71 KG/M2 | HEART RATE: 100 BPM

## 2018-11-27 DIAGNOSIS — M41.25 OTHER IDIOPATHIC SCOLIOSIS, THORACOLUMBAR REGION: ICD-10-CM

## 2018-11-27 DIAGNOSIS — G89.4 CHRONIC PAIN SYNDROME: ICD-10-CM

## 2018-11-27 DIAGNOSIS — M54.50 LOW BACK PAIN WITH RADIATION: ICD-10-CM

## 2018-11-27 DIAGNOSIS — E55.9 VITAMIN D DEFICIENCY: ICD-10-CM

## 2018-11-27 DIAGNOSIS — G14 POST-POLIO SYNDROME: ICD-10-CM

## 2018-11-27 PROCEDURE — 99214 OFFICE O/P EST MOD 30 MIN: CPT | Performed by: PHYSICAL MEDICINE & REHABILITATION

## 2018-11-27 RX ORDER — ALBUTEROL SULFATE 90 UG/1
2 AEROSOL, METERED RESPIRATORY (INHALATION) EVERY 6 HOURS PRN
COMMUNITY

## 2018-11-27 RX ORDER — MELATONIN
1000 DAILY
Qty: 90 TAB | Refills: 1 | Status: SHIPPED | OUTPATIENT
Start: 2018-11-27 | End: 2019-02-25

## 2018-11-27 RX ORDER — BUPRENORPHINE 5 UG/H
1 PATCH TRANSDERMAL
Qty: 4 PATCH | Refills: 0 | Status: SHIPPED | OUTPATIENT
Start: 2018-11-27 | End: 2018-12-21

## 2018-11-27 ASSESSMENT — PAIN SCALES - GENERAL: PAINLEVEL: 8=MODERATE-SEVERE PAIN

## 2018-11-27 NOTE — PROGRESS NOTES
Follow-up patient note    Physiatry (physical medicine and  Rehabilitation), interventional spine and sports medicine    Date of Service: 11/27/2018    Chief complaint: Chronic pain    HISTORY    HPI: Jewell Skelton 68 y.o. female who presents today for evaluation of pain.  She has a history of post-polio.  She would like to have pain medications for her pain.  She reports that she has been having more pain in her back and left leg.  She has not found gabapentin to be helpful with her symptoms.  Sitting relieves her symptoms.  No difficulty with her left KAFO.    She has stopped her kratom since prior to surgery.  Dr. Centeno gave her percocet post-op, but she is out now.  She was out by 11/15/2018 and no more medications were written.    She smokes about 1/2 PPD and has been trying to quit.    Her diet is pretty limited to potatoes and eggs, but she does eat some vegetables at least once a day.  Her daughter is trying to help make sure that she is getting vegetables.    Medical records review:  I reviewed the note from the referring provider Margy Urbina P.A.    Previous treatments:    Physical Therapy: Yes, plateau was hit.    Medications the patient is tried: Narcotics, tylenol, muscle relaxants      Previous interventions: yes    Previous surgeries to relieve the above pain:  none      ROS: Unchanged from previous visit.  Eyes: Wears glasses  ENT: history of post-nasal drip  Resp:COPD  GI:h/o ulcers, acid reflux, Hep C  Skin: skin cancer  Heme: history of bleeding    Red Flags ROS:   Fever, Chills, Sweats: Denies  Involuntary Weight Loss: Denies  Bladder Incontinence: Denies  Bowel Incontinence: denies  Saddle Anesthesia: Denies    All other systems reviewed and negative.       PMHx:   Past Medical History:   Diagnosis Date   • Abnormal radiologic finding of lung field 9/8/2017    History of bilateral pulmonary nodules   • Anesthesia     patient's mom had side effects   • Arthritis 10/31/2018     "\"everywhere\"   • Asthma    • Back pain    • Breath shortness    • Cancer ([facility]) 2013    squamous cell   • Chickenpox    • COPD (chronic obstructive pulmonary disease) (HCC) 9/8/2017   • Dental disorder 10/31/2018    upper   • Emphysema of lung ([facility])    • Fatigue    • Marshallese measles    • Heart burn    • Hepatitis C     dx 10/2018   • Hypertension 10/31/2018   • Influenza    • Mumps    • Polio    • Seizure (HCC)     \"possibly 1 time in 2000\"   • Wears glasses        PSHx:   Past Surgical History:   Procedure Laterality Date   • FINGER ARTHROPLASTY Right 11/2/2018    Procedure: FINGER ARTHROPLASTY - THUMB CARPOMETACARPAL FUSION VERSUS THUMB METACARPOPHALANGEAL FUSION, THUMB CARPOMETACARPAL EXCISIONAL ARTHROPLASTY, FLEXOR CARPI RADIALIS GRAFT;  Surgeon: Jerzy Ritchie M.D.;  Location: SURGERY SAME DAY [facility];  Service: Orthopedics   • CHOLECYSTECTOMY     • GASTROSTOMY     • OTHER ORTHOPEDIC SURGERY      Left ankle fusion, right foot surgery   • PRIMARY C SECTION      hysterectomy 1995   • TONSILLECTOMY         Family history   Family History   Problem Relation Age of Onset   • Diabetes Mother    • Lung Cancer Father          Medications:   Outpatient Prescriptions Marked as Taking for the 11/27/18 encounter (Office Visit) with Neftali Painter M.D.   Medication Sig Dispense Refill   • albuterol 108 (90 Base) MCG/ACT Aero Soln inhalation aerosol Inhale 2 Puffs by mouth every 6 hours as needed for Shortness of Breath.     • vitamin D3, cholecalciferol, 1000 UNIT Tab Take 1 Tab by mouth every day for 90 days. 90 Tab 1   • Buprenorphine (BUTRANS) 5 MCG/HR PATCH WEEKLY Apply 1 Patch to skin as directed every 7 days for 28 days. 4 Patch 0   • acetaminophen (TYLENOL) 325 MG Tab Take 650 mg by mouth every four hours as needed.     • tiotropium (SPIRIVA HANDIHALER) 18 MCG Cap Inhale 18 mcg by mouth every day.     • Fluticasone Furoate-Vilanterol (BREO ELLIPTA) 100-25 MCG/INH AEROSOL POWDER, BREATH ACTIVATED Inhale 1 " "Puff by mouth every day. Rinse mouth after use. 1 Each 5   • busPIRone (BUSPAR) 30 MG tablet TAKE ONE TABLET BY MOUTH TWICE PER DAY.  2   • Calcium Carb-Cholecalciferol (CALCIUM 600 + D PO) Take 1 tablet by mouth every day.     • amlodipine (NORVASC) 5 MG Tab Take 5 mg by mouth every day.     • ranitidine (ZANTAC) 150 MG Tab Take 150 mg by mouth 2 times a day.     Amlodipine is 7.5mg      Allergies:   Allergies   Allergen Reactions   • Tape Unspecified     Rips off epidermis   • Ambien [Zolpidem] Unspecified     Memory loss   • Augmentin Nausea   • Bloodless      protocal per patient request; MD notified       Social Hx:   Social History     Social History   • Marital status:      Spouse name: N/A   • Number of children: N/A   • Years of education: N/A     Occupational History   • Not on file.     Social History Main Topics   • Smoking status: Current Every Day Smoker     Packs/day: 0.50     Years: 47.00     Types: Cigarettes   • Smokeless tobacco: Never Used      Comment: \"working on cutting down as a family\"   • Alcohol use No   • Drug use: No   • Sexual activity: Not on file     Other Topics Concern   •  Service No   • Blood Transfusions No   • Caffeine Concern No   • Occupational Exposure No   • Hobby Hazards No   • Sleep Concern No   • Stress Concern Yes   • Weight Concern No   • Special Diet No   • Back Care No   • Exercise Yes   • Bike Helmet No   • Seat Belt Yes   • Self-Exams Yes     Social History Narrative   • No narrative on file         EXAMINATION     Physical Exam:   Vitals: Blood pressure 120/80, pulse 100, temperature 36.2 °C (97.2 °F), temperature source Temporal, height 1.473 m (4' 10\"), weight 36.1 kg (79 lb 9.4 oz), SpO2 95 %.    Constitutional:   Body Habitus: Body mass index is 16.63 kg/m².  Cooperation: Fully cooperates with exam  Appearance: Well-groomed, very thin, not disheveled, in no acute distress    Eyes: No scleral icterus, no proptosis     ENT -no obvious auditory " deficits, no obvious tongue lesions, tongue midline, no facial droop     Skin -no rashes or lesions noted     Respiratory-  breathing comfortable on room air, no audible wheezing    Cardiovascular- No lower extremity edema is noted.     Psychiatric- alert and oriented ×3. Normal affect.     Gait - Abnormal gait, left KAFO with no motion at the ankle or knee during gait, no loss of balance.  She uses crutches for longer distances    Musculoskeletal -   Thoracic/Lumbar Spine/Sacral Spine/Hips   Inspection: Significant atrophy in bilateral lower extremities throughout   ROM: decreased AROM with flexion, extension, lateral flexion, and rotation bilaterally, without pain, prominent thoracic paraspinals on the right    Palpation:   No tenderness to palpation in midline at T1-T12 levels. No tenderness to palpation in the left and right of the midline T1-L5  palpation over SI joint: negative bilaterally    palpation over buttock: negative bilaterally    palpation in hip or over the greater trochanters: negative bilaterally      Lumbar spine Special tests  Neuro tension  Seated straight leg test positive on the left and negative on the right      Neuro     Key points for the international standards for neurological classification of spinal cord injury (ISNCSCI) to light touch.     Dermatome R L   L2 2 2   L3 2 2   L4 2 2   L5 2 2   S1 2 2   S2 2 2       Motor Exam Lower Extremities    ? Myotome R L   Hip flexion L2 4 3   Knee extension L3 4 0   Ankle dorsiflexion L4 4+ 0   Toe extension L5 3 0   Ankle plantarflexion S1 4+ 0       Reflexes  ?  R L   Patella  0 0   Achilles   0 0       MEDICAL DECISION MAKING    Medical records review: see under HPI section.     DATA    Labs:   Lab Results   Component Value Date/Time    SODIUM 130 (L) 11/02/2018 11:37 AM    POTASSIUM 3.8 11/02/2018 11:37 AM    CHLORIDE 100 11/02/2018 11:37 AM    CO2 24 11/02/2018 11:37 AM    ANION 6.0 11/02/2018 11:37 AM    GLUCOSE 92 11/02/2018 11:37 AM     BUN 8 11/02/2018 11:37 AM    CREATININE 0.25 (L) 11/02/2018 11:37 AM    CALCIUM 9.0 11/02/2018 11:37 AM    ASTSGOT 19 11/02/2018 11:37 AM    ALTSGPT 12 11/02/2018 11:37 AM    TBILIRUBIN 0.3 11/02/2018 11:37 AM    ALBUMIN 3.9 11/02/2018 11:37 AM    TOTPROTEIN 6.8 11/02/2018 11:37 AM    GLOBULIN 2.9 11/02/2018 11:37 AM    AGRATIO 1.3 11/02/2018 11:37 AM          Lab Results   Component Value Date/Time    WBC 11.1 (H) 11/15/2018 01:19 PM    RBC 4.75 11/15/2018 01:19 PM    HEMOGLOBIN 14.8 11/15/2018 01:19 PM    HEMATOCRIT 45.9 11/15/2018 01:19 PM    MCV 96.6 11/15/2018 01:19 PM    MCH 31.2 11/15/2018 01:19 PM    MCHC 32.2 (L) 11/15/2018 01:19 PM    MPV 9.4 11/15/2018 01:19 PM    NEUTSPOLYS 65.70 11/15/2018 01:19 PM    LYMPHOCYTES 23.40 11/15/2018 01:19 PM    MONOCYTES 9.60 11/15/2018 01:19 PM    EOSINOPHILS 0.30 11/15/2018 01:19 PM    BASOPHILS 0.60 11/15/2018 01:19 PM         Imaging:No imaging studies to review                                                                                                                                                                              Diagnosis   Visit Diagnoses     ICD-10-CM   1. Other idiopathic scoliosis, thoracolumbar region M41.25   2. Post-polio syndrome G14   3. Chronic pain syndrome G89.4   4. Low back pain with radiation M54.40           ASSESSMENT:  Jewell Skelton 68 y.o. female with history of polio      Jewell was seen today for follow-up.    Diagnoses and all orders for this visit:    Other idiopathic scoliosis, thoracolumbar region  -     Buprenorphine (BUTRANS) 5 MCG/HR PATCH WEEKLY; Apply 1 Patch to skin as directed every 7 days for 28 days.  -     Consent for Opiate Prescription  -     MR-LUMBAR SPINE-W/O; Future    Post-polio syndrome  -     Buprenorphine (BUTRANS) 5 MCG/HR PATCH WEEKLY; Apply 1 Patch to skin as directed every 7 days for 28 days.  -     Consent for Opiate Prescription    Chronic pain syndrome  -     Buprenorphine (BUTRANS) 5 MCG/HR PATCH  WEEKLY; Apply 1 Patch to skin as directed every 7 days for 28 days.  -     Tegaderm Large  -     Consent for Opiate Prescription    Low back pain with radiation  -     MR-LUMBAR SPINE-W/O; Future    Other orders  -     vitamin D3, cholecalciferol, 1000 UNIT Tab; Take 1 Tab by mouth every day for 90 days.         Plan to recheck labs and imaging as above.  MRI lumbar spine ordered to further evaluate.    While I would recommend alternate ambulatory devices, she has used forearm crutches in the past and prefers to use axillary crutches.  She and her daughter report that she does not put pressure on her armpits and does not want to try other options at this time.    Butrans patch ordered.  Discussed risks, benefits and expectations.  Stressed importance of rotating site of patches.  Tegaderm suggested to cover butrans patch.    Discussed that I would consider possible use of butrans (buprenorphine) patch, but need to have records and work-up.  She is a high risk based on the ORT at 17.  This and the previous history that she and her daughter report, suggest that use of other medications for pain might not be without significant risk of abuse or diversion.      Follow-up with PCP for general medical complaints.        Outside records requested:  The patient signed outside records request form for her outside records including outside images.      Follow-up:4 weeks      Please note that this dictation was created using voice recognition software. I have made every reasonable attempt to correct obvious errors but there may be errors of grammar and content that I may have overlooked prior to finalization of this note.      Neftali Painter MD  Physical Medicine and Rehabilitation  Interventional Spine and Sports Physiatry  [facility]           CC Margy Urbina P.A.

## 2018-12-13 ENCOUNTER — HOSPITAL ENCOUNTER (OUTPATIENT)
Dept: LAB | Facility: MEDICAL CENTER | Age: 68
End: 2018-12-13
Attending: PHYSICIAN ASSISTANT
Payer: MEDICARE

## 2018-12-13 LAB
ALBUMIN SERPL BCP-MCNC: 4.2 G/DL (ref 3.2–4.9)
ALBUMIN/GLOB SERPL: 1.3 G/DL
ALP SERPL-CCNC: 74 U/L (ref 30–99)
ALT SERPL-CCNC: 17 U/L (ref 2–50)
ANION GAP SERPL CALC-SCNC: 8 MMOL/L (ref 0–11.9)
AST SERPL-CCNC: 26 U/L (ref 12–45)
BASOPHILS # BLD AUTO: 0.5 % (ref 0–1.8)
BASOPHILS # BLD: 0.04 K/UL (ref 0–0.12)
BILIRUB SERPL-MCNC: 0.3 MG/DL (ref 0.1–1.5)
BUN SERPL-MCNC: 13 MG/DL (ref 8–22)
CALCIUM SERPL-MCNC: 9.1 MG/DL (ref 8.5–10.5)
CHLORIDE SERPL-SCNC: 97 MMOL/L (ref 96–112)
CO2 SERPL-SCNC: 23 MMOL/L (ref 20–33)
CREAT SERPL-MCNC: 0.39 MG/DL (ref 0.5–1.4)
EOSINOPHIL # BLD AUTO: 0.06 K/UL (ref 0–0.51)
EOSINOPHIL NFR BLD: 0.8 % (ref 0–6.9)
ERYTHROCYTE [DISTWIDTH] IN BLOOD BY AUTOMATED COUNT: 50.3 FL (ref 35.9–50)
FASTING STATUS PATIENT QL REPORTED: NORMAL
GLOBULIN SER CALC-MCNC: 3.3 G/DL (ref 1.9–3.5)
GLUCOSE SERPL-MCNC: 121 MG/DL (ref 65–99)
HCT VFR BLD AUTO: 42.1 % (ref 37–47)
HGB BLD-MCNC: 13.7 G/DL (ref 12–16)
IMM GRANULOCYTES # BLD AUTO: 0.02 K/UL (ref 0–0.11)
IMM GRANULOCYTES NFR BLD AUTO: 0.3 % (ref 0–0.9)
LYMPHOCYTES # BLD AUTO: 2.74 K/UL (ref 1–4.8)
LYMPHOCYTES NFR BLD: 35.6 % (ref 22–41)
MCH RBC QN AUTO: 30.8 PG (ref 27–33)
MCHC RBC AUTO-ENTMCNC: 32.5 G/DL (ref 33.6–35)
MCV RBC AUTO: 94.6 FL (ref 81.4–97.8)
MONOCYTES # BLD AUTO: 0.51 K/UL (ref 0–0.85)
MONOCYTES NFR BLD AUTO: 6.6 % (ref 0–13.4)
NEUTROPHILS # BLD AUTO: 4.32 K/UL (ref 2–7.15)
NEUTROPHILS NFR BLD: 56.2 % (ref 44–72)
NRBC # BLD AUTO: 0 K/UL
NRBC BLD-RTO: 0 /100 WBC
PLATELET # BLD AUTO: 361 K/UL (ref 164–446)
PMV BLD AUTO: 9.7 FL (ref 9–12.9)
POTASSIUM SERPL-SCNC: 3.8 MMOL/L (ref 3.6–5.5)
PROT SERPL-MCNC: 7.5 G/DL (ref 6–8.2)
RBC # BLD AUTO: 4.45 M/UL (ref 4.2–5.4)
SODIUM SERPL-SCNC: 128 MMOL/L (ref 135–145)
WBC # BLD AUTO: 7.7 K/UL (ref 4.8–10.8)

## 2018-12-13 PROCEDURE — 85025 COMPLETE CBC W/AUTO DIFF WBC: CPT

## 2018-12-13 PROCEDURE — 80053 COMPREHEN METABOLIC PANEL: CPT

## 2018-12-13 PROCEDURE — 87902 NFCT AGT GNTYP ALYS HEP C: CPT

## 2018-12-13 PROCEDURE — 36415 COLL VENOUS BLD VENIPUNCTURE: CPT

## 2018-12-15 DIAGNOSIS — J44.9 CHRONIC OBSTRUCTIVE PULMONARY DISEASE, UNSPECIFIED COPD TYPE (HCC): ICD-10-CM

## 2018-12-17 ENCOUNTER — HOSPITAL ENCOUNTER (OUTPATIENT)
Dept: RADIOLOGY | Facility: MEDICAL CENTER | Age: 68
End: 2018-12-17
Attending: PHYSICAL MEDICINE & REHABILITATION
Payer: MEDICARE

## 2018-12-17 VITALS — BODY MASS INDEX: 16.58 KG/M2 | WEIGHT: 79 LBS | HEIGHT: 58 IN

## 2018-12-17 DIAGNOSIS — M54.50 LOW BACK PAIN WITH RADIATION: ICD-10-CM

## 2018-12-17 DIAGNOSIS — M41.25 OTHER IDIOPATHIC SCOLIOSIS, THORACOLUMBAR REGION: ICD-10-CM

## 2018-12-17 LAB — HCV GENTYP SERPL NAA+PROBE: NORMAL

## 2018-12-17 PROCEDURE — 72148 MRI LUMBAR SPINE W/O DYE: CPT

## 2018-12-17 PROCEDURE — 700102 HCHG RX REV CODE 250 W/ 637 OVERRIDE(OP): Performed by: RADIOLOGY

## 2018-12-17 PROCEDURE — A9270 NON-COVERED ITEM OR SERVICE: HCPCS | Performed by: RADIOLOGY

## 2018-12-17 RX ORDER — DIAZEPAM 5 MG/1
5 TABLET ORAL
Status: COMPLETED | OUTPATIENT
Start: 2018-12-17 | End: 2018-12-17

## 2018-12-17 RX ADMIN — DIAZEPAM 5 MG: 5 TABLET ORAL at 09:22

## 2018-12-17 ASSESSMENT — PAIN SCALES - GENERAL
PAINLEVEL_OUTOF10: 0
PAINLEVEL_OUTOF10: 0

## 2018-12-17 NOTE — DISCHARGE INSTRUCTIONS
MRI ADULT DISCHARGE INSTRUCTIONS    You have been medicated today for your scan. Please follow the instructions below to ensure your safe recovery. If you have any questions or problems, feel free to call us at [phone] or [phone].     1.   Have someone stay with you to assist you as needed.    2.   Do not drive or operate any mechanical devices.    3.   Do not perform any activity that requires concentration. Make no major decisions over the next 24 hours.     4.   Be careful changing positions from laying down to sitting or standing, as you may become dizzy.     5.   Do not drink alcohol for 48 hours.    6.   There are no restrictions for eating your normal meals. Drink fluids.    7.   You may continue your usual medications for pain, tranquilizers, muscle relaxants or sedatives when awake.     8.   Tomorrow, you may continue your normal daily activities.     9.   Pressure dressing on 10 - 15 minutes. If swelling or bleeding occurs when removed, continue placing direct pressure on injection site for another 5 minutes, or until bleeding stops.     I have been informed of and understand the above discharge instructions. Diazepam (VALIUM) Oral solution  What is this medicine?  You were prescribed DIAZEPAM (dye AZ e jahaira) for the procedure you had today. This medication is a benzodiazepine. It is used to treat anxiety and nervousness. It also can help treat alcohol withdrawal, relax muscles, and treat certain types of seizures.  This medicine may be used for other purposes; ask your health care provider or pharmacist if you have questions.  What side effects may I notice from receiving this medicine?  Side effects that you should report to your doctor or health care professional as soon as possible:  • allergic reactions like skin rash, itching or hives, swelling of the face, lips, or tongue  • angry, confused, depressed, other mood changes  • breathing problems  • feeling faint or lightheaded, falls  • muscle  cramps  • problems with balance, talking, walking  • restlessness  • tremors  • trouble passing urine or change in the amount of urine  • unusually weak or tired  Side effects that usually do not require medical attention (report to your doctor or health care professional if they continue or are bothersome):  • difficulty sleeping, nightmares  • dizziness, drowsiness, clumsiness, or unsteadiness, a hangover effect  • headache  • nausea, vomiting  This list may not describe all possible side effects. Call your doctor for medical advice about side effects. You may report side effects to FDA at 0-835-MLF-2248.

## 2018-12-17 NOTE — TELEPHONE ENCOUNTER
Caller Name: Jewell Skelton                 Call Back Number: [phone] (home)         Patient approves a detailed voicemail message: N\A    Have we ever prescribed this med? Yes.  If yes, what date? 7/16/18    Last OV: 7/16/18 ROBEL Allen   PLAN:   Patient Instructions   1) Start Breo 1 puff, once a day with mouth rinse  2) Continue Spiriva daily  3) Continue rescue inhaler as needed  4) Smoking cessation discussed  5) Prednisone taper and zpack on hand for emergency   6) Vaccines: Up to date with Prevnar 13, Pneumovax 23  7) Encourage light conditioning  8) Updated CT due April 2019  9) Return in about 2 months (around 9/16/2018) for follow up with ROBEL Robles, review of symptoms, if not sooner. for follow up with ROBEL Robles, review of symptoms, if not sooner.          Next OV: no pending appt     DX: COPD     Breo     Medications:  Current Outpatient Prescriptions   Medication Sig Dispense Refill   • albuterol 108 (90 Base) MCG/ACT Aero Soln inhalation aerosol Inhale 2 Puffs by mouth every 6 hours as needed for Shortness of Breath.     • vitamin D3, cholecalciferol, 1000 UNIT Tab Take 1 Tab by mouth every day for 90 days. 90 Tab 1   • Buprenorphine (BUTRANS) 5 MCG/HR PATCH WEEKLY Apply 1 Patch to skin as directed every 7 days for 28 days. 4 Patch 0   • acetaminophen (TYLENOL) 325 MG Tab Take 650 mg by mouth every four hours as needed.     • tiotropium (SPIRIVA HANDIHALER) 18 MCG Cap Inhale 18 mcg by mouth every day.     • Fluticasone Furoate-Vilanterol (BREO ELLIPTA) 100-25 MCG/INH AEROSOL POWDER, BREATH ACTIVATED Inhale 1 Puff by mouth every day. Rinse mouth after use. 1 Each 5   • busPIRone (BUSPAR) 30 MG tablet TAKE ONE TABLET BY MOUTH TWICE PER DAY.  2   • Calcium Carb-Cholecalciferol (CALCIUM 600 + D PO) Take 1 tablet by mouth every day.     • amlodipine (NORVASC) 5 MG Tab Take 5 mg by mouth every day.     • ranitidine (ZANTAC) 150 MG Tab Take 150 mg by mouth 2 times a day.        No current facility-administered medications for this visit.

## 2018-12-21 ENCOUNTER — PATIENT OUTREACH (OUTPATIENT)
Dept: OTHER | Facility: MEDICAL CENTER | Age: 68
End: 2018-12-21

## 2018-12-21 ENCOUNTER — OFFICE VISIT (OUTPATIENT)
Dept: PHYSICAL MEDICINE AND REHAB | Facility: MEDICAL CENTER | Age: 68
End: 2018-12-21
Payer: MEDICARE

## 2018-12-21 VITALS
BODY MASS INDEX: 16.75 KG/M2 | HEIGHT: 58 IN | OXYGEN SATURATION: 93 % | DIASTOLIC BLOOD PRESSURE: 78 MMHG | SYSTOLIC BLOOD PRESSURE: 138 MMHG | HEART RATE: 100 BPM | TEMPERATURE: 98.5 F | WEIGHT: 79.81 LBS

## 2018-12-21 DIAGNOSIS — G14 POST-POLIO SYNDROME: ICD-10-CM

## 2018-12-21 DIAGNOSIS — M47.816 LUMBAR SPONDYLOSIS: ICD-10-CM

## 2018-12-21 DIAGNOSIS — M41.25 OTHER IDIOPATHIC SCOLIOSIS, THORACOLUMBAR REGION: ICD-10-CM

## 2018-12-21 DIAGNOSIS — M54.50 LOW BACK PAIN WITH RADIATION: ICD-10-CM

## 2018-12-21 DIAGNOSIS — G89.4 CHRONIC PAIN SYNDROME: ICD-10-CM

## 2018-12-21 DIAGNOSIS — F11.90 CHRONIC, CONTINUOUS USE OF OPIOIDS: ICD-10-CM

## 2018-12-21 PROCEDURE — 99214 OFFICE O/P EST MOD 30 MIN: CPT | Performed by: PHYSICAL MEDICINE & REHABILITATION

## 2018-12-21 RX ORDER — BUPRENORPHINE 7.5 UG/H
1 PATCH TRANSDERMAL
Qty: 4 PATCH | Refills: 0 | Status: SHIPPED | OUTPATIENT
Start: 2018-12-30 | End: 2019-01-22 | Stop reason: SDUPTHER

## 2018-12-21 ASSESSMENT — PAIN SCALES - GENERAL: PAINLEVEL: 6=MODERATE PAIN

## 2018-12-21 NOTE — PROGRESS NOTES
"Follow-up patient note    Physiatry (physical medicine and  Rehabilitation), interventional spine and sports medicine    Date of Service: 12/21/2018    Chief complaint: Chronic pain    HISTORY    HPI: Jewell Skelton 68 y.o. female who presents today for evaluation of pain.  She has a history of post-polio.  She reports that she has been having more pain in her back and left leg.  The back and left leg pain are improved with pain medication, but still relatively constant.  Sitting relieves her symptoms, but this is difficult as she knows she should be more active.  No difficulty with her left KAFO.    She and her daughter are here today for follow-up to review her MRI.  Her pain is better with the use of butrans patch.  It does seem to make her more comfortable, she thinks that she is about 30-40% better with the use of the patch.  No skin issues.  No side effects.    Medical records review:    Previous treatments:    Physical Therapy: Yes, plateau was hit.    Medications the patient is tried: Narcotics, tylenol, muscle relaxants      Previous interventions: yes    Previous surgeries to relieve the above pain:  none      ROS: Unchanged from previous visit.  Eyes: Wears glasses  ENT: history of post-nasal drip  Resp:COPD  GI:h/o ulcers, acid reflux, Hep C  Skin: skin cancer  Heme: history of bleeding    Red Flags ROS:   Fever, Chills, Sweats: Denies  Involuntary Weight Loss: Denies  Bladder Incontinence: Denies  Bowel Incontinence: denies  Saddle Anesthesia: Denies    All other systems reviewed and negative.       PMHx:   Past Medical History:   Diagnosis Date   • Abnormal radiologic finding of lung field 9/8/2017    History of bilateral pulmonary nodules   • Anesthesia     patient's mom had side effects   • Arthritis 10/31/2018    \"everywhere\"   • Asthma    • Back pain    • Breath shortness    • Cancer ([facility]) 2013    squamous cell   • Chickenpox    • COPD (chronic obstructive pulmonary disease) ([facility]) 9/8/2017   • Dental " "disorder 10/31/2018    upper   • Emphysema of lung ([facility])    • Fatigue    • Sinhala measles    • Heart burn    • Hepatitis C     dx 10/2018   • Hypertension 10/31/2018   • Influenza    • Mumps    • Polio    • Seizure ([facility]CC)     \"possibly 1 time in 2000\"   • Wears glasses        PSHx:   Past Surgical History:   Procedure Laterality Date   • FINGER ARTHROPLASTY Right 11/2/2018    Procedure: FINGER ARTHROPLASTY - THUMB CARPOMETACARPAL FUSION VERSUS THUMB METACARPOPHALANGEAL FUSION, THUMB CARPOMETACARPAL EXCISIONAL ARTHROPLASTY, FLEXOR CARPI RADIALIS GRAFT;  Surgeon: Jerzy Ritchie M.D.;  Location: SURGERY SAME DAY [facility];  Service: Orthopedics   • CHOLECYSTECTOMY     • GASTROSTOMY     • OTHER ORTHOPEDIC SURGERY      Left ankle fusion, right foot surgery   • PRIMARY C SECTION      hysterectomy 1995   • TONSILLECTOMY         Family history   Family History   Problem Relation Age of Onset   • Diabetes Mother    • Lung Cancer Father          Medications:   Outpatient Prescriptions Marked as Taking for the 12/21/18 encounter (Office Visit) with Neftali Painter M.D.   Medication Sig Dispense Refill   • [START ON 12/30/2018] Buprenorphine (BUTRANS) 7.5 MCG/HR PATCH WEEKLY Apply 1 Patch to skin as directed every 7 days for 28 days. 4 Patch 0   • BREO ELLIPTA 100-25 MCG/INH AEROSOL POWDER, BREATH ACTIVATED INHALE 1 PUFF BY MOUTH EVERY DAY. RINSE MOUTH AFTER USE. 1 Each 5   • albuterol 108 (90 Base) MCG/ACT Aero Soln inhalation aerosol Inhale 2 Puffs by mouth every 6 hours as needed for Shortness of Breath.     • acetaminophen (TYLENOL) 325 MG Tab Take 650 mg by mouth every four hours as needed.     • tiotropium (SPIRIVA HANDIHALER) 18 MCG Cap Inhale 18 mcg by mouth every day.     • Fluticasone Furoate-Vilanterol (BREO ELLIPTA) 100-25 MCG/INH AEROSOL POWDER, BREATH ACTIVATED Inhale 1 Puff by mouth every day. Rinse mouth after use. 1 Each 5   • busPIRone (BUSPAR) 30 MG tablet TAKE ONE TABLET BY MOUTH TWICE PER DAY.  2 " "  • Calcium Carb-Cholecalciferol (CALCIUM 600 + D PO) Take 1 tablet by mouth every day.     • amlodipine (NORVASC) 5 MG Tab Take 5 mg by mouth every day.     • ranitidine (ZANTAC) 150 MG Tab Take 150 mg by mouth 2 times a day.     Amlodipine is 7.5mg      Allergies:   Allergies   Allergen Reactions   • Tape Unspecified     Rips off epidermis   • Ambien [Zolpidem] Unspecified     Memory loss   • Augmentin Nausea   • Bloodless      protocal per patient request; MD notified       Social Hx:   Social History     Social History   • Marital status:      Spouse name: N/A   • Number of children: N/A   • Years of education: N/A     Occupational History   • Not on file.     Social History Main Topics   • Smoking status: Current Every Day Smoker     Packs/day: 0.50     Years: 47.00     Types: Cigarettes   • Smokeless tobacco: Never Used      Comment: \"working on cutting down as a family\"   • Alcohol use No   • Drug use: No   • Sexual activity: Not on file     Other Topics Concern   •  Service No   • Blood Transfusions No   • Caffeine Concern No   • Occupational Exposure No   • Hobby Hazards No   • Sleep Concern No   • Stress Concern Yes   • Weight Concern No   • Special Diet No   • Back Care No   • Exercise Yes   • Bike Helmet No   • Seat Belt Yes   • Self-Exams Yes     Social History Narrative   • No narrative on file         EXAMINATION     Physical Exam:   Vitals: Blood pressure 138/78, pulse 100, temperature 36.9 °C (98.5 °F), height 1.473 m (4' 10\"), weight 36.2 kg (79 lb 12.9 oz), SpO2 93 %.    Constitutional:   Body Habitus: Body mass index is 16.68 kg/m².  Cooperation: Fully cooperates with exam  Appearance: Well-groomed, very thin, not disheveled, in no acute distress    Eyes: No scleral icterus, no proptosis     ENT -no obvious auditory deficits, no obvious tongue lesions, tongue midline, no facial droop     Skin -no rashes or lesions noted     Respiratory-  breathing comfortable on room air, no " audible wheezing    Cardiovascular- No lower extremity edema is noted.     Psychiatric- alert and oriented ×3. Normal affect.     Gait - Abnormal gait, left KAFO with no motion at the ankle or knee during gait, no loss of balance.  She uses crutches for longer distances    Musculoskeletal -   Thoracic/Lumbar Spine/Sacral Spine/Hips   Inspection: Significant atrophy in bilateral lower extremities throughout   No significant muscle spasms in the lumbar spine.    Lumbar spine Special tests  Neuro tension  Seated straight leg test weakly positive on the left and negative on the right      Neuro     Key points for the international standards for neurological classification of spinal cord injury (ISNCSCI) to light touch.     Dermatome R L   L2 2 2   L3 2 2   L4 2 2   L5 2 2   S1 2 2   S2 2 2       Motor Exam Lower Extremities    ? Myotome R L   Hip flexion L2 4 3   Knee extension L3 4 0   Ankle dorsiflexion L4 4+ 0   Toe extension L5 3 0   Ankle plantarflexion S1 4+ 0       Reflexes  ?  R L   Patella  0 0   Achilles   0 0       MEDICAL DECISION MAKING    Medical records review: see under HPI section.     DATA    Labs:   Lab Results   Component Value Date/Time    SODIUM 128 (L) 12/13/2018 11:22 AM    POTASSIUM 3.8 12/13/2018 11:22 AM    CHLORIDE 97 12/13/2018 11:22 AM    CO2 23 12/13/2018 11:22 AM    ANION 8.0 12/13/2018 11:22 AM    GLUCOSE 121 (H) 12/13/2018 11:22 AM    BUN 13 12/13/2018 11:22 AM    CREATININE 0.39 (L) 12/13/2018 11:22 AM    CALCIUM 9.1 12/13/2018 11:22 AM    ASTSGOT 26 12/13/2018 11:22 AM    ALTSGPT 17 12/13/2018 11:22 AM    TBILIRUBIN 0.3 12/13/2018 11:22 AM    ALBUMIN 4.2 12/13/2018 11:22 AM    TOTPROTEIN 7.5 12/13/2018 11:22 AM    GLOBULIN 3.3 12/13/2018 11:22 AM    AGRATIO 1.3 12/13/2018 11:22 AM          Lab Results   Component Value Date/Time    WBC 7.7 12/13/2018 11:22 AM    RBC 4.45 12/13/2018 11:22 AM    HEMOGLOBIN 13.7 12/13/2018 11:22 AM    HEMATOCRIT 42.1 12/13/2018 11:22 AM    MCV 94.6  12/13/2018 11:22 AM    MCH 30.8 12/13/2018 11:22 AM    MCHC 32.5 (L) 12/13/2018 11:22 AM    MPV 9.7 12/13/2018 11:22 AM    NEUTSPOLYS 56.20 12/13/2018 11:22 AM    LYMPHOCYTES 35.60 12/13/2018 11:22 AM    MONOCYTES 6.60 12/13/2018 11:22 AM    EOSINOPHILS 0.80 12/13/2018 11:22 AM    BASOPHILS 0.50 12/13/2018 11:22 AM         Imaging:  MRI lumbar spine: Reviewed, this is my interpretation   12/17/2018  There is note of multilevel disc bulges without central canal stenosis.  There is lumbar facet hypertrophy noted in the lumbar facets bilaterally.  Foraminal canal stenosis is moderate right L2-3, mild L4-5 bilaterally.    MRI lumbar spine report reviewed  12/17/2018 Multilevel degenerative changes of the lumbar spine as described                                                                                                                                                                            Diagnosis   Visit Diagnoses     ICD-10-CM   1. Other idiopathic scoliosis, thoracolumbar region M41.25   2. Post-polio syndrome G14   3. Low back pain with radiation M54.40   4. Lumbar spondylosis M47.816   5. Chronic pain syndrome G89.4   6. Chronic, continuous use of opioids F11.90           ASSESSMENT:  Jewell Skelton 68 y.o. female with history of polio      Jewell was seen today for follow-up.    Diagnoses and all orders for this visit:    Other idiopathic scoliosis, thoracolumbar region  -     Buprenorphine (BUTRANS) 7.5 MCG/HR PATCH WEEKLY; Apply 1 Patch to skin as directed every 7 days for 28 days.    Post-polio syndrome    Low back pain with radiation  -     Buprenorphine (BUTRANS) 7.5 MCG/HR PATCH WEEKLY; Apply 1 Patch to skin as directed every 7 days for 28 days.    Lumbar spondylosis    Chronic pain syndrome    Chronic, continuous use of opioids         MRI lumbar spine reviewed with patient and her daughter.  We discussed possible lumbar facet blocks and possible RFA depending on the result.    We will reconsider  this at her next visit.    Encouraged her to start chair yoga for seniors.  We discussed goals and limitations and ways to incorporate a few minutes of activity daily to work on building her strength.  We discussed functional goals.  She gets fatigued after physician appointments.    Follow-up with PCP for general medical complaints and metabolic abnormalities.    Butrans patch ordered.  Discussed risks, benefits and expectations.  Stressed importance of rotating site of patches.  Trial butrans 7.5mcg/hr    Discussed that I would consider possible use of butrans (buprenorphine) patch, but need to have records and work-up.  She is a high risk based on the ORT at 17.  This and the previous history that she and her daughter report, suggest that use of other medications for pain might not be without significant risk of abuse or diversion.          Follow-up:4 weeks      Please note that this dictation was created using voice recognition software. I have made every reasonable attempt to correct obvious errors but there may be errors of grammar and content that I may have overlooked prior to finalization of this note.      Neftali Painter MD  Physical Medicine and Rehabilitation  Interventional Spine and Sports Physiatry  [facility]           CC Margy Urbina P.A.

## 2018-12-26 ENCOUNTER — TELEPHONE (OUTPATIENT)
Dept: PHYSICAL MEDICINE AND REHAB | Facility: MEDICAL CENTER | Age: 68
End: 2018-12-26

## 2018-12-26 NOTE — TELEPHONE ENCOUNTER
Called in Rx for Butrans 5 mcg/hr Patch to [facility].  Spoke to Rachel QUARLES [facility] Pharmacist.    Thank you  Bia

## 2018-12-26 NOTE — TELEPHONE ENCOUNTER
Spoke to  for PA for Butrans 7.5 mcg/hr patch and she mentioned that there is no available for that strength only 5 mcg/hr patch.  5 mcg is already approved and did LM to Pt to notify her about that.    Thank you  Bia

## 2019-01-02 NOTE — TELEPHONE ENCOUNTER
Thank you.  We will have to address this with the insurance company as this doesn't make much sense.

## 2019-01-02 NOTE — TELEPHONE ENCOUNTER
Received a call from patient stating that she has not heard back from anyone regarding her RX of the Butrans patch. I contacted the Pharmacy and confirmed that the 7.5 mcg is available to order. They do not have it currently in stock. It would need to be special ordered. I then contacted the insurance company and attempted to place another prior authorization for the 7.5 mcg with the Qty of #4 for a 30 day supply. [employer] confirmed the medication did not need a prior auth. Then contacted the pharmacy for a second time and confirmed the med.I was advised that the medication is on back order for the 7.5 mcg. Per Dr Painter the patient can move foreward with the 5 mcg. Pharmacy confirmed they do have it in stock and would fill the item for the patient.  Then contacted the patient and advised them of moving forward with 5mcg RX. Patient advised.

## 2019-01-22 ENCOUNTER — OFFICE VISIT (OUTPATIENT)
Dept: PHYSICAL MEDICINE AND REHAB | Facility: MEDICAL CENTER | Age: 69
End: 2019-01-22
Payer: MEDICARE

## 2019-01-22 VITALS
SYSTOLIC BLOOD PRESSURE: 130 MMHG | HEART RATE: 100 BPM | BODY MASS INDEX: 17.96 KG/M2 | DIASTOLIC BLOOD PRESSURE: 82 MMHG | WEIGHT: 85.54 LBS | TEMPERATURE: 97.4 F | HEIGHT: 58 IN | OXYGEN SATURATION: 90 %

## 2019-01-22 DIAGNOSIS — F11.90 CHRONIC, CONTINUOUS USE OF OPIOIDS: ICD-10-CM

## 2019-01-22 DIAGNOSIS — M54.50 LOW BACK PAIN WITH RADIATION: ICD-10-CM

## 2019-01-22 DIAGNOSIS — G14 POST-POLIO SYNDROME: ICD-10-CM

## 2019-01-22 DIAGNOSIS — M41.25 OTHER IDIOPATHIC SCOLIOSIS, THORACOLUMBAR REGION: ICD-10-CM

## 2019-01-22 DIAGNOSIS — E87.1 HYPONATREMIA: ICD-10-CM

## 2019-01-22 PROCEDURE — 99214 OFFICE O/P EST MOD 30 MIN: CPT | Performed by: PHYSICAL MEDICINE & REHABILITATION

## 2019-01-22 RX ORDER — GLECAPREVIR AND PIBRENTASVIR 40; 100 MG/1; MG/1
TABLET, FILM COATED ORAL
COMMUNITY
Start: 2019-01-08 | End: 2019-05-16

## 2019-01-22 RX ORDER — BUPRENORPHINE 7.5 UG/H
1 PATCH TRANSDERMAL
Qty: 4 PATCH | Refills: 0 | Status: SHIPPED | OUTPATIENT
Start: 2019-01-22 | End: 2019-02-19 | Stop reason: SDUPTHER

## 2019-01-22 RX ORDER — MIRTAZAPINE 15 MG/1
30 TABLET, FILM COATED ORAL DAILY
Refills: 1 | COMMUNITY
Start: 2019-01-02 | End: 2021-03-26

## 2019-01-22 ASSESSMENT — PAIN SCALES - GENERAL: PAINLEVEL: 6=MODERATE PAIN

## 2019-01-22 ASSESSMENT — PATIENT HEALTH QUESTIONNAIRE - PHQ9
CLINICAL INTERPRETATION OF PHQ2 SCORE: 1
5. POOR APPETITE OR OVEREATING: 1 - SEVERAL DAYS
SUM OF ALL RESPONSES TO PHQ QUESTIONS 1-9: 6

## 2019-01-22 NOTE — PROGRESS NOTES
Follow-up patient note    Physiatry (physical medicine and  Rehabilitation), interventional spine and sports medicine    Date of Service: 1/22/2019    Chief complaint: Chronic pain    HISTORY    HPI: Jewell Skelton 68 y.o. female who presents today, accompanied by her daughter, for follow-up of pain related to her scoliosis, low back and history of post-polio.  Since the last visit, she still has not been able to get the butrans 7.5mcg/hr patch.  The  Pharmacy was able to fill the 5mcg/hr patch.      She continues to have pain in her low back that is primarily aching.  She wears her left KAFO.   No falls.  She continues to use her axillary crutches.      The back and left leg pain are improved with pain medication, but still relatively constant.  She has also started Mayevyt for her hepatitis.  She feels more tired and somewhat weak today, but this is her 4th doctor's visit today.  This is more activity then she usually has.    Her pain is better with the use of butrans patch.  No skin issues.  She is about 30-40% better with the use of the patch.     ORT: 15   reviewed.  Butrans 5mcg/hr patch last filled on 01/02/2019    PHQ9 -6    Medical records review:    Previous treatments:    Physical Therapy: Yes, plateau was hit.    Medications the patient is tried: Narcotics, tylenol, muscle relaxants      Previous interventions: yes    Previous surgeries to relieve the above pain:  none      ROS: Unchanged from previous visit.  Eyes: Wears glasses  ENT: history of post-nasal drip  Resp:COPD  GI:h/o ulcers, acid reflux, Hep C  Skin: skin cancer  Heme: history of bleeding    Red Flags ROS:   Fever, Chills, Sweats: Denies  Involuntary Weight Loss: Denies  Bladder Incontinence: Denies  Bowel Incontinence: denies  Saddle Anesthesia: Denies    All other systems reviewed and negative.       PMHx:   Past Medical History:   Diagnosis Date   • Abnormal radiologic finding of lung field 9/8/2017    History of bilateral pulmonary  "nodules   • Anesthesia     patient's mom had side effects   • Arthritis 10/31/2018    \"everywhere\"   • Asthma    • Back pain    • Breath shortness    • Cancer (HCC) 2013    squamous cell   • Chickenpox    • COPD (chronic obstructive pulmonary disease) (HCC) 9/8/2017   • Dental disorder 10/31/2018    upper   • Emphysema of lung (HCC)    • Fatigue    • Hebrew measles    • Heart burn    • Hepatitis C     dx 10/2018   • Hypertension 10/31/2018   • Influenza    • Mumps    • Polio    • Seizure (HCC)     \"possibly 1 time in 2000\"   • Wears glasses        PSHx:   Past Surgical History:   Procedure Laterality Date   • FINGER ARTHROPLASTY Right 11/2/2018    Procedure: FINGER ARTHROPLASTY - THUMB CARPOMETACARPAL FUSION VERSUS THUMB METACARPOPHALANGEAL FUSION, THUMB CARPOMETACARPAL EXCISIONAL ARTHROPLASTY, FLEXOR CARPI RADIALIS GRAFT;  Surgeon: Jerzy Ritchie M.D.;  Location: SURGERY SAME DAY [facility];  Service: Orthopedics   • CHOLECYSTECTOMY     • GASTROSTOMY     • OTHER ORTHOPEDIC SURGERY      Left ankle fusion, right foot surgery   • PRIMARY C SECTION      hysterectomy 1995   • TONSILLECTOMY         Family history   Family History   Problem Relation Age of Onset   • Diabetes Mother    • Lung Cancer Father          Medications:   Outpatient Prescriptions Marked as Taking for the 1/22/19 encounter (Office Visit) with Neftali Painter M.D.   Medication Sig Dispense Refill   • MAVYRET 100-40 MG Tab tablet      • mirtazapine (REMERON) 15 MG Tab TAKE ONCE TABLET BY MOUTH AT BEDTIME.  1   • Buprenorphine (BUTRANS) 7.5 MCG/HR PATCH WEEKLY Apply 1 Patch to skin as directed every 7 days for 28 days. 4 Patch 0   • BREO ELLIPTA 100-25 MCG/INH AEROSOL POWDER, BREATH ACTIVATED INHALE 1 PUFF BY MOUTH EVERY DAY. RINSE MOUTH AFTER USE. 1 Each 5   • albuterol 108 (90 Base) MCG/ACT Aero Soln inhalation aerosol Inhale 2 Puffs by mouth every 6 hours as needed for Shortness of Breath.     • vitamin D3, cholecalciferol, 1000 UNIT Tab " "Take 1 Tab by mouth every day for 90 days. 90 Tab 1   • acetaminophen (TYLENOL) 325 MG Tab Take 650 mg by mouth every four hours as needed.     • tiotropium (SPIRIVA HANDIHALER) 18 MCG Cap Inhale 18 mcg by mouth every day.     • Fluticasone Furoate-Vilanterol (BREO ELLIPTA) 100-25 MCG/INH AEROSOL POWDER, BREATH ACTIVATED Inhale 1 Puff by mouth every day. Rinse mouth after use. 1 Each 5   • busPIRone (BUSPAR) 30 MG tablet TAKE ONE TABLET BY MOUTH TWICE PER DAY.  2   • Calcium Carb-Cholecalciferol (CALCIUM 600 + D PO) Take 1 tablet by mouth every day.     • amlodipine (NORVASC) 5 MG Tab Take 5 mg by mouth every day.     • ranitidine (ZANTAC) 150 MG Tab Take 150 mg by mouth 2 times a day.     Amlodipine is 7.5mg      Allergies:   Allergies   Allergen Reactions   • Tape Unspecified     Rips off epidermis   • Ambien [Zolpidem] Unspecified     Memory loss   • Augmentin Nausea   • Bloodless      protocal per patient request; MD notified       Social Hx:   Social History     Social History   • Marital status:      Spouse name: N/A   • Number of children: N/A   • Years of education: N/A     Occupational History   • Not on file.     Social History Main Topics   • Smoking status: Current Every Day Smoker     Packs/day: 0.50     Years: 47.00     Types: Cigarettes   • Smokeless tobacco: Never Used      Comment: \"working on cutting down as a family\"   • Alcohol use No   • Drug use: No   • Sexual activity: Not on file     Other Topics Concern   •  Service No   • Blood Transfusions No   • Caffeine Concern No   • Occupational Exposure No   • Hobby Hazards No   • Sleep Concern No   • Stress Concern Yes   • Weight Concern No   • Special Diet No   • Back Care No   • Exercise Yes   • Bike Helmet No   • Seat Belt Yes   • Self-Exams Yes     Social History Narrative   • No narrative on file         EXAMINATION     Physical Exam:   Vitals: Blood pressure 130/82, pulse 100, temperature 36.3 °C (97.4 °F), temperature source " "Temporal, height 1.473 m (4' 10\"), weight 38.8 kg (85 lb 8.6 oz), SpO2 90 %.    Constitutional:   Body Habitus: Body mass index is 17.88 kg/m².  Cooperation: Fully cooperates with exam  Appearance: Well-groomed, very thin, not disheveled, in no acute distress    Eyes: No scleral icterus, no proptosis     ENT -no obvious auditory deficits, no facial droop     Skin -no rashes or lesions noted     Respiratory-  breathing comfortable on room air, no audible wheezing    Cardiovascular- No lower extremity edema is noted.     Psychiatric- alert and oriented ×3. Normal affect.     Gait - Abnormal gait, left KAFO with no motion at the ankle or knee during gait, no loss of balance.  She uses crutches for longer distances    Musculoskeletal -   Thoracic/Lumbar Spine/Sacral Spine/Hips   Inspection: Significant atrophy in bilateral lower extremities throughout   No significant muscle spasms in the lumbar spine.    Lumbar spine Special tests  Neuro tension  Seated straight leg test weakly positive on the left and negative on the right      Neuro     Key points for the international standards for neurological classification of spinal cord injury (ISNCSCI) to light touch.     Dermatome R L   L2 2 2   L3 2 2   L4 2 2   L5 2 2   S1 2 2   S2 2 2       Motor Exam Lower Extremities    ? Myotome R L   Hip flexion L2 4 3   Knee extension L3 4- 0   Knee flexion L5 3+ 0   Ankle dorsiflexion L4 4 0   Toe extension L5 3 0   Ankle plantarflexion S1 4+ 0       Reflexes  ?  R L   Patella  0 0   Achilles   0 0       MEDICAL DECISION MAKING    Medical records review: see under HPI section.     DATA    Labs:   Lab Results   Component Value Date/Time    SODIUM 128 (L) 12/13/2018 11:22 AM    POTASSIUM 3.8 12/13/2018 11:22 AM    CHLORIDE 97 12/13/2018 11:22 AM    CO2 23 12/13/2018 11:22 AM    ANION 8.0 12/13/2018 11:22 AM    GLUCOSE 121 (H) 12/13/2018 11:22 AM    BUN 13 12/13/2018 11:22 AM    CREATININE 0.39 (L) 12/13/2018 11:22 AM    CALCIUM 9.1 " 12/13/2018 11:22 AM    ASTSGOT 26 12/13/2018 11:22 AM    ALTSGPT 17 12/13/2018 11:22 AM    TBILIRUBIN 0.3 12/13/2018 11:22 AM    ALBUMIN 4.2 12/13/2018 11:22 AM    TOTPROTEIN 7.5 12/13/2018 11:22 AM    GLOBULIN 3.3 12/13/2018 11:22 AM    AGRATIO 1.3 12/13/2018 11:22 AM          Lab Results   Component Value Date/Time    WBC 7.7 12/13/2018 11:22 AM    RBC 4.45 12/13/2018 11:22 AM    HEMOGLOBIN 13.7 12/13/2018 11:22 AM    HEMATOCRIT 42.1 12/13/2018 11:22 AM    MCV 94.6 12/13/2018 11:22 AM    MCH 30.8 12/13/2018 11:22 AM    MCHC 32.5 (L) 12/13/2018 11:22 AM    MPV 9.7 12/13/2018 11:22 AM    NEUTSPOLYS 56.20 12/13/2018 11:22 AM    LYMPHOCYTES 35.60 12/13/2018 11:22 AM    MONOCYTES 6.60 12/13/2018 11:22 AM    EOSINOPHILS 0.80 12/13/2018 11:22 AM    BASOPHILS 0.50 12/13/2018 11:22 AM         Imaging:  MRI lumbar spine: Reviewed, this is my interpretation   12/17/2018  There is note of multilevel disc bulges without central canal stenosis.  There is lumbar facet hypertrophy noted in the lumbar facets bilaterally.  Foraminal canal stenosis is moderate right L2-3, mild L4-5 bilaterally.    MRI lumbar spine report reviewed  12/17/2018 Multilevel degenerative changes of the lumbar spine as described                                                                                                                                                                         Diagnosis   Visit Diagnoses     ICD-10-CM   1. Other idiopathic scoliosis, thoracolumbar region M41.25   2. Low back pain with radiation M54.40   3. Post-polio syndrome G14   4. Chronic, continuous use of opioids F11.90   5. Hyponatremia E87.1       ASSESSMENT:  Jewell Skelton 68 y.o. female with history of polio, scoliosis, low back pain with radiation     Jewell was seen today for follow-up.    Diagnoses and all orders for this visit:    Other idiopathic scoliosis, thoracolumbar region  -     Buprenorphine (BUTRANS) 7.5 MCG/HR PATCH WEEKLY; Apply 1 Patch to skin as  directed every 7 days for 28 days.  -     Consent for Opiate Prescription    Low back pain with radiation  -     Buprenorphine (BUTRANS) 7.5 MCG/HR PATCH WEEKLY; Apply 1 Patch to skin as directed every 7 days for 28 days.  -     Consent for Opiate Prescription    Post-polio syndrome    Chronic, continuous use of opioids    Hyponatremia       MRI lumbar spine reviewed with patient and her daughter.  We discussed possible lumbar facet blocks and possible RFA depending on the result.    We are going to put this on hold for now.  She is potentially moving out of her house and is being treated for hepatitis C.  We will revisit.    Encouraged her to start chair yoga for seniors, when able.    We discussed functional goals.  She gets fatigued after physician appointments.  Discussed spacing out visits.    Follow-up with PCP for general medical complaints and metabolic abnormalities.  Advised that she follow-up on recommendations with regard to the hyponatremia.    Butrans patch ordered.  Discussed risks, benefits and expectations.  Stressed importance of rotating site of patches.  Trial butrans 7.5mcg/hr, script given again.  She and her daughter will let us know if they have trouble filling this again.  We will need to figure out what the story is.  She has one more week of the butrans 5mcg/hr patch.    Discussed that I would consider possible use of butrans (buprenorphine) patch, but need to have records and work-up.  She is a high risk based on the ORT at 15.  This and the previous history that she and her daughter report, suggest that use of other medications for pain might not be without significant risk of abuse or diversion.          Follow-up:4 weeks      Please note that this dictation was created using voice recognition software. I have made every reasonable attempt to correct obvious errors but there may be errors of grammar and content that I may have overlooked prior to finalization of this note.      Neftali  MD Inocencio  Physical Medicine and Rehabilitation  Interventional Spine and Sports Physiatry  [facility]        Margy Urbina P.A.

## 2019-02-11 ENCOUNTER — HOSPITAL ENCOUNTER (OUTPATIENT)
Dept: LAB | Facility: MEDICAL CENTER | Age: 69
End: 2019-02-11
Attending: PHYSICIAN ASSISTANT
Payer: MEDICARE

## 2019-02-11 LAB
ALBUMIN SERPL BCP-MCNC: 3.8 G/DL (ref 3.2–4.9)
ALBUMIN/GLOB SERPL: 0.8 G/DL
ALP SERPL-CCNC: 93 U/L (ref 30–99)
ALT SERPL-CCNC: 8 U/L (ref 2–50)
ANION GAP SERPL CALC-SCNC: 11 MMOL/L (ref 0–11.9)
AST SERPL-CCNC: 19 U/L (ref 12–45)
BASOPHILS # BLD AUTO: 0.4 % (ref 0–1.8)
BASOPHILS # BLD: 0.09 K/UL (ref 0–0.12)
BILIRUB SERPL-MCNC: 1.7 MG/DL (ref 0.1–1.5)
BUN SERPL-MCNC: 10 MG/DL (ref 8–22)
CALCIUM SERPL-MCNC: 9.3 MG/DL (ref 8.5–10.5)
CHLORIDE SERPL-SCNC: 101 MMOL/L (ref 96–112)
CO2 SERPL-SCNC: 23 MMOL/L (ref 20–33)
CREAT SERPL-MCNC: 0.44 MG/DL (ref 0.5–1.4)
EOSINOPHIL # BLD AUTO: 0.01 K/UL (ref 0–0.51)
EOSINOPHIL NFR BLD: 0 % (ref 0–6.9)
ERYTHROCYTE [DISTWIDTH] IN BLOOD BY AUTOMATED COUNT: 61.1 FL (ref 35.9–50)
GLOBULIN SER CALC-MCNC: 4.5 G/DL (ref 1.9–3.5)
GLUCOSE SERPL-MCNC: 115 MG/DL (ref 65–99)
HCT VFR BLD AUTO: 41.2 % (ref 37–47)
HGB BLD-MCNC: 12.5 G/DL (ref 12–16)
IMM GRANULOCYTES # BLD AUTO: 0.11 K/UL (ref 0–0.11)
IMM GRANULOCYTES NFR BLD AUTO: 0.5 % (ref 0–0.9)
LYMPHOCYTES # BLD AUTO: 2.82 K/UL (ref 1–4.8)
LYMPHOCYTES NFR BLD: 13.6 % (ref 22–41)
MCH RBC QN AUTO: 28.8 PG (ref 27–33)
MCHC RBC AUTO-ENTMCNC: 30.3 G/DL (ref 33.6–35)
MCV RBC AUTO: 94.9 FL (ref 81.4–97.8)
MONOCYTES # BLD AUTO: 1.06 K/UL (ref 0–0.85)
MONOCYTES NFR BLD AUTO: 5.1 % (ref 0–13.4)
NEUTROPHILS # BLD AUTO: 16.57 K/UL (ref 2–7.15)
NEUTROPHILS NFR BLD: 80.4 % (ref 44–72)
NRBC # BLD AUTO: 0 K/UL
NRBC BLD-RTO: 0 /100 WBC
PLATELET # BLD AUTO: 589 K/UL (ref 164–446)
PMV BLD AUTO: 9.2 FL (ref 9–12.9)
POTASSIUM SERPL-SCNC: 3.4 MMOL/L (ref 3.6–5.5)
PROT SERPL-MCNC: 8.3 G/DL (ref 6–8.2)
RBC # BLD AUTO: 4.34 M/UL (ref 4.2–5.4)
SODIUM SERPL-SCNC: 135 MMOL/L (ref 135–145)
WBC # BLD AUTO: 20.7 K/UL (ref 4.8–10.8)

## 2019-02-11 PROCEDURE — 80053 COMPREHEN METABOLIC PANEL: CPT

## 2019-02-11 PROCEDURE — 87522 HEPATITIS C REVRS TRNSCRPJ: CPT

## 2019-02-11 PROCEDURE — 36415 COLL VENOUS BLD VENIPUNCTURE: CPT

## 2019-02-11 PROCEDURE — 85025 COMPLETE CBC W/AUTO DIFF WBC: CPT

## 2019-02-14 LAB
HCV RNA SERPL NAA+PROBE-ACNC: NOT DETECTED IU/ML
HCV RNA SERPL NAA+PROBE-LOG IU: NOT DETECTED LOG IU/ML
HCV RNA SERPL QL NAA+PROBE: NOT DETECTED

## 2019-02-19 ENCOUNTER — OFFICE VISIT (OUTPATIENT)
Dept: PHYSICAL MEDICINE AND REHAB | Facility: MEDICAL CENTER | Age: 69
End: 2019-02-19
Payer: MEDICARE

## 2019-02-19 VITALS
SYSTOLIC BLOOD PRESSURE: 110 MMHG | TEMPERATURE: 98.4 F | OXYGEN SATURATION: 100 % | HEIGHT: 58 IN | DIASTOLIC BLOOD PRESSURE: 60 MMHG | HEART RATE: 99 BPM | BODY MASS INDEX: 17.22 KG/M2 | WEIGHT: 82.01 LBS

## 2019-02-19 DIAGNOSIS — G14 POST-POLIO SYNDROME: ICD-10-CM

## 2019-02-19 DIAGNOSIS — G89.4 CHRONIC PAIN SYNDROME: ICD-10-CM

## 2019-02-19 DIAGNOSIS — M41.25 OTHER IDIOPATHIC SCOLIOSIS, THORACOLUMBAR REGION: ICD-10-CM

## 2019-02-19 DIAGNOSIS — M54.50 LOW BACK PAIN WITH RADIATION: ICD-10-CM

## 2019-02-19 PROCEDURE — 99214 OFFICE O/P EST MOD 30 MIN: CPT | Performed by: PHYSICAL MEDICINE & REHABILITATION

## 2019-02-19 RX ORDER — AMLODIPINE BESYLATE 2.5 MG/1
7.5 TABLET ORAL
Refills: 3 | COMMUNITY
Start: 2019-01-18

## 2019-02-19 RX ORDER — BUPRENORPHINE 7.5 UG/H
1 PATCH TRANSDERMAL
Qty: 4 PATCH | Refills: 0 | Status: SHIPPED | OUTPATIENT
Start: 2019-02-21 | End: 2019-03-19 | Stop reason: SDUPTHER

## 2019-02-19 ASSESSMENT — PAIN SCALES - GENERAL: PAINLEVEL: 6=MODERATE PAIN

## 2019-02-19 ASSESSMENT — PATIENT HEALTH QUESTIONNAIRE - PHQ9: CLINICAL INTERPRETATION OF PHQ2 SCORE: 0

## 2019-02-19 NOTE — PROGRESS NOTES
Follow-up patient note    Physiatry (physical medicine and  Rehabilitation), interventional spine and sports medicine    Date of Service: 02/19/2019    Chief complaint: Chronic pain    HISTORY    HPI: Jewell Skelton 68 y.o. female who presents today, accompanied by her daughter, for follow-up of pain related to her scoliosis, low back and history of post-polio.  Since the last visit, she was able to get the butrans 7.5mcg/hr patch.  No side effects.  No skin issues.    She continues to have pain in her low back that is primarily aching.  She wears her left KAFO.   No falls.  She continues to use her axillary crutches.  In the trailer that she is living in currently, she is using a cane.  Still, no falls.      Since the last visit, she was diagnosed with pneumonia and a UTI.  This has made her feel more tired.    Her right thumb was fused and she has a cast that comes off soon.  Her left thumb was also fused, it is less painful that it was.    Her pain is better with the use of butrans patch.  No skin issues.  She is about 30-40% better with the use of the patch.     ORT: 15   reviewed.  Butrans 7.5mcg/hr filled 01/24/2019    PHQ9 -6    Medical records review:  Since her last visit, she had pneumonia and bladder infection    Previous treatments:    Physical Therapy: Yes, plateau was hit.    Medications the patient is tried: Narcotics, tylenol, muscle relaxants      Previous interventions: yes    Previous surgeries to relieve the above pain:  none      ROS: Pneumonia and UTI, otherwise unchanged from previous visit 01/22/2019 except as in HPI  Eyes: Wears glasses  ENT: history of post-nasal drip  Resp:COPD  GI:h/o ulcers, acid reflux, Hep C  Skin: skin cancer  Heme: history of bleeding    Red Flags ROS:   Fever, Chills, Sweats: Denies  Involuntary Weight Loss: Denies  Bladder Incontinence: Denies  Bowel Incontinence: denies  Saddle Anesthesia: Denies    All other systems reviewed and negative.       PMHx:   Past  "Medical History:   Diagnosis Date   • Abnormal radiologic finding of lung field 9/8/2017    History of bilateral pulmonary nodules   • Anesthesia     patient's mom had side effects   • Arthritis 10/31/2018    \"everywhere\"   • Asthma    • Back pain    • Breath shortness    • Cancer (HCC) 2013    squamous cell   • Chickenpox    • COPD (chronic obstructive pulmonary disease) (HCC) 9/8/2017   • Dental disorder 10/31/2018    upper   • Emphysema of lung (HCC)    • Fatigue    • [place] measles    • Heart burn    • Hepatitis C     dx 10/2018   • Hypertension 10/31/2018   • Influenza    • Mumps    • Polio    • Seizure (HCC)     \"possibly 1 time in 2000\"   • Wears glasses        PSHx:   Past Surgical History:   Procedure Laterality Date   • FINGER ARTHROPLASTY Right 11/2/2018    Procedure: FINGER ARTHROPLASTY - THUMB CARPOMETACARPAL FUSION VERSUS THUMB METACARPOPHALANGEAL FUSION, THUMB CARPOMETACARPAL EXCISIONAL ARTHROPLASTY, FLEXOR CARPI RADIALIS GRAFT;  Surgeon: Jerzy Ritchie M.D.;  Location: SURGERY SAME DAY [facility];  Service: Orthopedics   • CHOLECYSTECTOMY     • GASTROSTOMY     • OTHER ORTHOPEDIC SURGERY      Left ankle fusion, right foot surgery   • PRIMARY C SECTION      hysterectomy 1995   • TONSILLECTOMY         Family history   Family History   Problem Relation Age of Onset   • Diabetes Mother    • Lung Cancer Father          Medications:   Outpatient Prescriptions Marked as Taking for the 2/19/19 encounter (Office Visit) with Neftali Painter M.D.   Medication Sig Dispense Refill   • [START ON 2/21/2019] Buprenorphine (BUTRANS) 7.5 MCG/HR PATCH WEEKLY Apply 1 Patch to skin as directed every 7 days for 28 days. 4 Patch 0   • MAVYRET 100-40 MG Tab tablet      • mirtazapine (REMERON) 15 MG Tab TAKE ONCE TABLET BY MOUTH AT BEDTIME.  1   • BREO ELLIPTA 100-25 MCG/INH AEROSOL POWDER, BREATH ACTIVATED INHALE 1 PUFF BY MOUTH EVERY DAY. RINSE MOUTH AFTER USE. 1 Each 5   • albuterol 108 (90 Base) MCG/ACT Aero Soln " "inhalation aerosol Inhale 2 Puffs by mouth every 6 hours as needed for Shortness of Breath.     • acetaminophen (TYLENOL) 325 MG Tab Take 650 mg by mouth every four hours as needed.     • tiotropium (SPIRIVA HANDIHALER) 18 MCG Cap Inhale 18 mcg by mouth every day.     • Fluticasone Furoate-Vilanterol (BREO ELLIPTA) 100-25 MCG/INH AEROSOL POWDER, BREATH ACTIVATED Inhale 1 Puff by mouth every day. Rinse mouth after use. 1 Each 5   • busPIRone (BUSPAR) 30 MG tablet TAKE ONE TABLET BY MOUTH TWICE PER DAY.  2   • Calcium Carb-Cholecalciferol (CALCIUM 600 + D PO) Take 1 tablet by mouth every day.     • amlodipine (NORVASC) 5 MG Tab Take 5 mg by mouth every day.     • ranitidine (ZANTAC) 150 MG Tab Take 150 mg by mouth 2 times a day.     Amlodipine is 7.5mg      Allergies:   Allergies   Allergen Reactions   • Tape Unspecified     Rips off epidermis   • Ambien [Zolpidem] Unspecified     Memory loss   • Augmentin Nausea   • Bloodless      protocal per patient request; MD notified       Social Hx:   Social History     Social History   • Marital status:      Spouse name: N/A   • Number of children: N/A   • Years of education: N/A     Occupational History   • Not on file.     Social History Main Topics   • Smoking status: Current Every Day Smoker     Packs/day: 0.50     Years: 47.00     Types: Cigarettes   • Smokeless tobacco: Never Used      Comment: \"working on cutting down as a family\"   • Alcohol use No   • Drug use: No   • Sexual activity: Not on file     Other Topics Concern   •  Service No   • Blood Transfusions No   • Caffeine Concern No   • Occupational Exposure No   • Hobby Hazards No   • Sleep Concern No   • Stress Concern Yes   • Weight Concern No   • Special Diet No   • Back Care No   • Exercise Yes   • Bike Helmet No   • Seat Belt Yes   • Self-Exams Yes     Social History Narrative   • No narrative on file         EXAMINATION     Physical Exam:   Vitals: Blood pressure 110/60, pulse 99, temperature " "36.9 °C (98.4 °F), temperature source Temporal, height 1.473 m (4' 10\"), weight 37.2 kg (82 lb 0.2 oz), SpO2 100 %.    Constitutional:   Body Habitus: Body mass index is 17.14 kg/m².  Cooperation: Fully cooperates with exam  Appearance: Well-groomed, very thin, not disheveled, in no acute distress    Eyes: No scleral icterus, no proptosis     ENT -no obvious auditory deficits, no facial droop     Skin -no rashes or lesions noted     Respiratory-  breathing comfortable on room air, no audible wheezing    Cardiovascular- No lower extremity edema is noted.     Psychiatric- alert and oriented ×3. Normal affect.     Gait - Abnormal gait, left KAFO with no motion at the ankle or knee during gait, with crutches    Musculoskeletal -   Thoracic/Lumbar Spine/Sacral Spine/Hips   Inspection: Significant atrophy in bilateral lower extremities throughout   No significant muscle spasms in the lumbar spine.  Diffuse atrophy in the lumbar spine bilaterally.    Neuro     Key points for the international standards for neurological classification of spinal cord injury (ISNCSCI) to light touch.     Dermatome R L   L2 2 2   L3 2 2   L4 2 2   L5 2 2   S1 2 2   S2 2 2       Motor Exam Lower Extremities    ? Myotome R L   Hip flexion L2 3 3   Knee extension L3 4- 0   Knee flexion L5 3- 0   Ankle dorsiflexion L4 4 0   Toe extension L5 3 0   Ankle plantarflexion S1 4+ 0       Reflexes  ?  R L   Patella  0 0   Achilles   0 0       MEDICAL DECISION MAKING    Medical records review: see under HPI section.     DATA    Labs:   Lab Results   Component Value Date/Time    SODIUM 135 02/11/2019 02:38 PM    POTASSIUM 3.4 (L) 02/11/2019 02:38 PM    CHLORIDE 101 02/11/2019 02:38 PM    CO2 23 02/11/2019 02:38 PM    ANION 11.0 02/11/2019 02:38 PM    GLUCOSE 115 (H) 02/11/2019 02:38 PM    BUN 10 02/11/2019 02:38 PM    CREATININE 0.44 (L) 02/11/2019 02:38 PM    CALCIUM 9.3 02/11/2019 02:38 PM    ASTSGOT 19 02/11/2019 02:38 PM    ALTSGPT 8 02/11/2019 02:38 PM "    TBILIRUBIN 1.7 (H) 02/11/2019 02:38 PM    ALBUMIN 3.8 02/11/2019 02:38 PM    TOTPROTEIN 8.3 (H) 02/11/2019 02:38 PM    GLOBULIN 4.5 (H) 02/11/2019 02:38 PM    AGRATIO 0.8 02/11/2019 02:38 PM          Lab Results   Component Value Date/Time    WBC 20.7 (H) 02/11/2019 02:38 PM    RBC 4.34 02/11/2019 02:38 PM    HEMOGLOBIN 12.5 02/11/2019 02:38 PM    HEMATOCRIT 41.2 02/11/2019 02:38 PM    MCV 94.9 02/11/2019 02:38 PM    MCH 28.8 02/11/2019 02:38 PM    MCHC 30.3 (L) 02/11/2019 02:38 PM    MPV 9.2 02/11/2019 02:38 PM    NEUTSPOLYS 80.40 (H) 02/11/2019 02:38 PM    LYMPHOCYTES 13.60 (L) 02/11/2019 02:38 PM    MONOCYTES 5.10 02/11/2019 02:38 PM    EOSINOPHILS 0.00 02/11/2019 02:38 PM    BASOPHILS 0.40 02/11/2019 02:38 PM         Imaging:  MRI lumbar spine: Reviewed, this is my interpretation   12/17/2018  There is note of multilevel disc bulges without central canal stenosis.  There is lumbar facet hypertrophy noted in the lumbar facets bilaterally.  Foraminal canal stenosis is moderate right L2-3, mild L4-5 bilaterally.    MRI lumbar spine report reviewed  12/17/2018 Multilevel degenerative changes of the lumbar spine as described                                                                                                                                                                         Diagnosis   Visit Diagnoses     ICD-10-CM   1. Other idiopathic scoliosis, thoracolumbar region M41.25   2. Low back pain with radiation M54.40   3. Post-polio syndrome G14   4. Chronic pain syndrome G89.4       ASSESSMENT:  Jewell Skelton 68 y.o. female with history of polio, scoliosis, low back pain with radiation     Jewell was seen today for follow-up.    Diagnoses and all orders for this visit:    Other idiopathic scoliosis, thoracolumbar region  -     Buprenorphine (BUTRANS) 7.5 MCG/HR PATCH WEEKLY; Apply 1 Patch to skin as directed every 7 days for 28 days.  -     Consent for Opiate Prescription    Low back pain with  radiation  -     PAIN MANAGEMENT SCRN, UR; Future  -     Buprenorphine (BUTRANS) 7.5 MCG/HR PATCH WEEKLY; Apply 1 Patch to skin as directed every 7 days for 28 days.  -     Consent for Opiate Prescription    Post-polio syndrome    Chronic pain syndrome  -     PAIN MANAGEMENT SCRN, UR; Future       Previously, we discussed possible lumbar facet blocks and possible RFA depending on the result.    We are going to put this on hold for now.      Discussed continuing butrans 7.5mcg/hr.  She feels like this is helping with her pain and has no side effects.    Check UDS today.    We discussed concern about slightly increased weakness in the right lower extremity.  She is not anxious to use a brace on the right.  We will put this on hold for now.  She will use axillary crutches at all times and reassess after she recovers more completely from her most recent illness.  Mavyret can also cause fatigue.    Butrans patch ordered.  Discussed risks, benefits and expectations.  Stressed importance of rotating site of patches.  Continue butrans 7.5mcg/hr patch.    Discussed that I would consider possible use of butrans (buprenorphine) patch, but need to have records and work-up.  She is a high risk based on the ORT at 15.  This and the previous history that she and her daughter report, suggest that use of other medications for pain might not be without significant risk of abuse or diversion.        Follow-up:4 weeks      Please note that this dictation was created using voice recognition software. I have made every reasonable attempt to correct obvious errors but there may be errors of grammar and content that I may have overlooked prior to finalization of this note.      Neftali Painter MD  Physical Medicine and Rehabilitation  Interventional Spine and Sports Physiatry  [facility]

## 2019-03-19 ENCOUNTER — OFFICE VISIT (OUTPATIENT)
Dept: PHYSICAL MEDICINE AND REHAB | Facility: MEDICAL CENTER | Age: 69
End: 2019-03-19
Payer: MEDICARE

## 2019-03-19 VITALS
OXYGEN SATURATION: 94 % | HEART RATE: 100 BPM | WEIGHT: 80.69 LBS | BODY MASS INDEX: 16.94 KG/M2 | HEIGHT: 58 IN | TEMPERATURE: 98.2 F | DIASTOLIC BLOOD PRESSURE: 82 MMHG | SYSTOLIC BLOOD PRESSURE: 118 MMHG

## 2019-03-19 DIAGNOSIS — Z87.891 FORMER MODERATE CIGARETTE SMOKER (10-19 PER DAY): ICD-10-CM

## 2019-03-19 DIAGNOSIS — M54.50 LOW BACK PAIN WITH RADIATION: ICD-10-CM

## 2019-03-19 DIAGNOSIS — G14 POST-POLIO SYNDROME: ICD-10-CM

## 2019-03-19 DIAGNOSIS — M85.80 OSTEOPENIA, UNSPECIFIED LOCATION: ICD-10-CM

## 2019-03-19 DIAGNOSIS — Z87.81 S/P TIBIAL FRACTURE: ICD-10-CM

## 2019-03-19 DIAGNOSIS — R29.898 RIGHT LEG WEAKNESS: ICD-10-CM

## 2019-03-19 DIAGNOSIS — R26.9 ABNORMAL GAIT: ICD-10-CM

## 2019-03-19 DIAGNOSIS — R29.898 LEFT LEG WEAKNESS: ICD-10-CM

## 2019-03-19 DIAGNOSIS — M41.25 OTHER IDIOPATHIC SCOLIOSIS, THORACOLUMBAR REGION: ICD-10-CM

## 2019-03-19 DIAGNOSIS — M84.462A: ICD-10-CM

## 2019-03-19 DIAGNOSIS — M47.816 LUMBAR SPONDYLOSIS: ICD-10-CM

## 2019-03-19 DIAGNOSIS — F11.90 CHRONIC, CONTINUOUS USE OF OPIOIDS: ICD-10-CM

## 2019-03-19 PROCEDURE — 99214 OFFICE O/P EST MOD 30 MIN: CPT | Performed by: PHYSICAL MEDICINE & REHABILITATION

## 2019-03-19 RX ORDER — BUPRENORPHINE 7.5 UG/H
1 PATCH TRANSDERMAL
Qty: 4 PATCH | Refills: 0 | Status: SHIPPED | OUTPATIENT
Start: 2019-03-21 | End: 2019-04-16 | Stop reason: SDUPTHER

## 2019-03-19 ASSESSMENT — PATIENT HEALTH QUESTIONNAIRE - PHQ9
SUM OF ALL RESPONSES TO PHQ QUESTIONS 1-9: 4
CLINICAL INTERPRETATION OF PHQ2 SCORE: 1
5. POOR APPETITE OR OVEREATING: 1 - SEVERAL DAYS

## 2019-03-19 ASSESSMENT — PAIN SCALES - GENERAL: PAINLEVEL: 8=MODERATE-SEVERE PAIN

## 2019-03-19 NOTE — PROGRESS NOTES
Follow-up patient note    Physiatry (physical medicine and  Rehabilitation), interventional spine and sports medicine    Date of Service: 03/19/2019    Chief complaint: Chronic pain    HISTORY    HPI: Jewell Skelton 68 y.o. female who presents today, accompanied by her daughter, for follow-up of pain related to her scoliosis, low back and history of post-polio.      Since the last visit, she has continued with the butrans 7.5mcg/hr patch.  No side effects.  No skin issues.  It helps with her pain, but does not completely eliminate it.    She continues to have pain in her low back that is primarily aching.  She wears her left KAFO.   She continues to use her axillary crutches.  While she denies falls, she still reports weakness in the right leg.  Her right leg has not been giving out on her, but she has not noted significant improvement in her strength since her pneumonia and bladder infection.    Since her last visit, she reports that she fractured her tibia.  This is intermittently painful.  She was wearing her brace when this happened.  She had swelling in her knee and then went to [facility] and was diagnosed with a tibial fracture.        ORT: 15   reviewed.  Butrans 7.5mcg/hr filled 01/24/2019    PHQ9 -6 previously, now 4    Medical records review:  Previous treatments:    Physical Therapy: Yes    Medications the patient is tried: Narcotics, tylenol, muscle relaxants      Previous interventions: yes    Previous surgeries to relieve the above pain:  none      ROS: Unchanged from previous visit 02/19/2019 except as noted  Eyes: Wears glasses  ENT: history of post-nasal drip  Resp:COPD  GI:h/o ulcers, acid reflux, Hep C  Skin: skin cancer  Heme: history of bleeding    Red Flags ROS:   Fever, Chills, Sweats: Denies  Involuntary Weight Loss: Denies  Bladder Incontinence: Denies  Bowel Incontinence: denies  Saddle Anesthesia: Denies    All other systems reviewed and negative.       PMHx:   Past Medical History:  "  Diagnosis Date   • Abnormal radiologic finding of lung field 9/8/2017    History of bilateral pulmonary nodules   • Anesthesia     patient's mom had side effects   • Arthritis 10/31/2018    \"everywhere\"   • Asthma    • Back pain    • Breath shortness    • Cancer (HCC) 2013    squamous cell   • Chickenpox    • COPD (chronic obstructive pulmonary disease) (HCC) 9/8/2017   • Dental disorder 10/31/2018    upper   • Emphysema of lung (HCC)    • Fatigue    • Martiniquais measles    • Heart burn    • Hepatitis C     dx 10/2018   • Hypertension 10/31/2018   • Influenza    • Mumps    • Polio    • Seizure (HCC)     \"possibly 1 time in 2000\"   • Wears glasses        PSHx:   Past Surgical History:   Procedure Laterality Date   • FINGER ARTHROPLASTY Right 11/2/2018    Procedure: FINGER ARTHROPLASTY - THUMB CARPOMETACARPAL FUSION VERSUS THUMB METACARPOPHALANGEAL FUSION, THUMB CARPOMETACARPAL EXCISIONAL ARTHROPLASTY, FLEXOR CARPI RADIALIS GRAFT;  Surgeon: Jerzy Ritchie M.D.;  Location: SURGERY SAME DAY [facility];  Service: Orthopedics   • CHOLECYSTECTOMY     • GASTROSTOMY     • OTHER ORTHOPEDIC SURGERY      Left ankle fusion, right foot surgery   • PRIMARY C SECTION      hysterectomy 1995   • TONSILLECTOMY         Family history   Family History   Problem Relation Age of Onset   • Diabetes Mother    • Lung Cancer Father          Medications:   Outpatient Prescriptions Marked as Taking for the 3/19/19 encounter (Office Visit) with Neftali Painter M.D.   Medication Sig Dispense Refill   • Buprenorphine (BUTRANS) 7.5 MCG/HR PATCH WEEKLY Apply 1 Patch to skin as directed every 7 days for 28 days. 4 Patch 0   • amLODIPine (NORVASC) 2.5 MG Tab 7.5 mg 1 time daily as needed. Takes 5mg and 2.5mg for total of 7.5mg  3   • mirtazapine (REMERON) 15 MG Tab TAKE ONCE TABLET BY MOUTH AT BEDTIME.  1   • BREO ELLIPTA 100-25 MCG/INH AEROSOL POWDER, BREATH ACTIVATED INHALE 1 PUFF BY MOUTH EVERY DAY. RINSE MOUTH AFTER USE. 1 Each 5   • " "albuterol 108 (90 Base) MCG/ACT Aero Soln inhalation aerosol Inhale 2 Puffs by mouth every 6 hours as needed for Shortness of Breath.     • acetaminophen (TYLENOL) 325 MG Tab Take 650 mg by mouth every four hours as needed.     • tiotropium (SPIRIVA HANDIHALER) 18 MCG Cap Inhale 18 mcg by mouth every day.     • Fluticasone Furoate-Vilanterol (BREO ELLIPTA) 100-25 MCG/INH AEROSOL POWDER, BREATH ACTIVATED Inhale 1 Puff by mouth every day. Rinse mouth after use. 1 Each 5   • busPIRone (BUSPAR) 30 MG tablet TAKE ONE TABLET BY MOUTH TWICE PER DAY.  2   • amlodipine (NORVASC) 5 MG Tab Take 5 mg by mouth every day.     • ranitidine (ZANTAC) 150 MG Tab Take 150 mg by mouth 2 times a day.     Amlodipine is 7.5mg      Allergies:   Allergies   Allergen Reactions   • Tape Unspecified     Rips off epidermis   • Ambien [Zolpidem] Unspecified     Memory loss   • Augmentin Nausea   • Bloodless      protocal per patient request; MD notified       Social Hx:   Social History     Social History   • Marital status:      Spouse name: N/A   • Number of children: N/A   • Years of education: N/A     Occupational History   • Not on file.     Social History Main Topics   • Smoking status: Current Every Day Smoker     Packs/day: 0.50     Years: 47.00     Types: Cigarettes   • Smokeless tobacco: Never Used      Comment: \"working on cutting down as a family\"   • Alcohol use No   • Drug use: No   • Sexual activity: Not on file     Other Topics Concern   •  Service No   • Blood Transfusions No   • Caffeine Concern No   • Occupational Exposure No   • Hobby Hazards No   • Sleep Concern No   • Stress Concern Yes   • Weight Concern No   • Special Diet No   • Back Care No   • Exercise Yes   • Bike Helmet No   • Seat Belt Yes   • Self-Exams Yes     Social History Narrative   • No narrative on file         EXAMINATION     Physical Exam:   Vitals: Blood pressure 118/82, pulse 100, temperature 36.8 °C (98.2 °F), temperature source " "Temporal, height 1.473 m (4' 10\"), weight 36.6 kg (80 lb 11 oz), SpO2 94 %.    Constitutional:   Body Habitus: Body mass index is 16.86 kg/m².  Cooperation: Fully cooperates with exam  Appearance: Well-groomed, very thin, not disheveled, in no acute distress    Eyes: No scleral icterus, no proptosis     ENT -no obvious auditory deficits, no facial droop     Skin -no rashes or lesions noted     Respiratory-  breathing comfortable on room air, no audible wheezing    Cardiovascular- No lower extremity edema is noted.     Psychiatric- alert and oriented ×3. Normal affect.     Gait - Abnormal gait, left KAFO with no motion at the ankle or knee during gait, with crutches    Musculoskeletal -   Thoracic/Lumbar Spine/Sacral Spine/Hips   Inspection: Significant atrophy in bilateral lower extremities throughout   No significant muscle spasms in the lumbar spine.      Neuro     Key points for the international standards for neurological classification of spinal cord injury (ISNCSCI) to light touch.     Dermatome R L   L2 2 2   L3 2 2   L4 2 2   L5 2 2   S1 2 2   S2 2 2       Motor Exam Lower Extremities    ? Myotome R L   Hip flexion L2 3 3   Knee extension L3 3 0   Knee flexion L5 3- 0   Ankle dorsiflexion L4 4- 0   Toe extension L5 3 0   Ankle plantarflexion S1 4+ 0       Reflexes  ?  R L   Patella  0 0   Achilles   0 0       MEDICAL DECISION MAKING    Medical records review: see under HPI section.     DATA    Labs:   Lab Results   Component Value Date/Time    SODIUM 135 02/11/2019 02:38 PM    POTASSIUM 3.4 (L) 02/11/2019 02:38 PM    CHLORIDE 101 02/11/2019 02:38 PM    CO2 23 02/11/2019 02:38 PM    ANION 11.0 02/11/2019 02:38 PM    GLUCOSE 115 (H) 02/11/2019 02:38 PM    BUN 10 02/11/2019 02:38 PM    CREATININE 0.44 (L) 02/11/2019 02:38 PM    CALCIUM 9.3 02/11/2019 02:38 PM    ASTSGOT 19 02/11/2019 02:38 PM    ALTSGPT 8 02/11/2019 02:38 PM    TBILIRUBIN 1.7 (H) 02/11/2019 02:38 PM    ALBUMIN 3.8 02/11/2019 02:38 PM    " TOTPROTEIN 8.3 (H) 02/11/2019 02:38 PM    GLOBULIN 4.5 (H) 02/11/2019 02:38 PM    AGRATIO 0.8 02/11/2019 02:38 PM          Lab Results   Component Value Date/Time    WBC 20.7 (H) 02/11/2019 02:38 PM    RBC 4.34 02/11/2019 02:38 PM    HEMOGLOBIN 12.5 02/11/2019 02:38 PM    HEMATOCRIT 41.2 02/11/2019 02:38 PM    MCV 94.9 02/11/2019 02:38 PM    MCH 28.8 02/11/2019 02:38 PM    MCHC 30.3 (L) 02/11/2019 02:38 PM    MPV 9.2 02/11/2019 02:38 PM    NEUTSPOLYS 80.40 (H) 02/11/2019 02:38 PM    LYMPHOCYTES 13.60 (L) 02/11/2019 02:38 PM    MONOCYTES 5.10 02/11/2019 02:38 PM    EOSINOPHILS 0.00 02/11/2019 02:38 PM    BASOPHILS 0.40 02/11/2019 02:38 PM       UDS 02/19/2019 Negative for substances, including buprenorphine.  This may test negative with use of butrans patch.    Imaging:  MRI lumbar spine: Reviewed, this is my interpretation   12/17/2018  There is note of multilevel disc bulges without central canal stenosis.  There is lumbar facet hypertrophy noted in the lumbar facets bilaterally.  Foraminal canal stenosis is moderate right L2-3, mild L4-5 bilaterally.    MRI lumbar spine report reviewed  12/17/2018 Multilevel degenerative changes of the lumbar spine as described                                                                                                                                                                         Diagnosis   Visit Diagnoses     ICD-10-CM   1. Post-polio syndrome G14   2. Chronic, continuous use of opioids F11.90   3. Lumbar spondylosis M47.816   4. Low back pain with radiation M54.40   5. Other idiopathic scoliosis, thoracolumbar region M41.25   6. Left leg weakness R29.898   7. Right leg weakness R29.898   8. Abnormal gait R26.9   9. Osteopenia, unspecified location M85.80   10. S/p tibial fracture Z87.81   11. Pathological fracture of left tibia, unspecified pathological cause, initial encounter M84.462A   12. Former moderate cigarette smoker (10-19 per day) Z87.891        ASSESSMENT:  Jewell Skelton 68 y.o. female with history of polio, scoliosis, low back pain with radiation     Jewell was seen today for follow-up.    Diagnoses and all orders for this visit:    Post-polio syndrome  -     REFERRAL TO PROSTHETICS (INCLUDE WITH ORTHOTICS)    Chronic, continuous use of opioids    Lumbar spondylosis    Low back pain with radiation  -     Buprenorphine (BUTRANS) 7.5 MCG/HR PATCH WEEKLY; Apply 1 Patch to skin as directed every 7 days for 28 days.    Other idiopathic scoliosis, thoracolumbar region  -     Buprenorphine (BUTRANS) 7.5 MCG/HR PATCH WEEKLY; Apply 1 Patch to skin as directed every 7 days for 28 days.    Left leg weakness  -     REFERRAL TO PROSTHETICS (INCLUDE WITH ORTHOTICS)    Right leg weakness  -     REFERRAL TO PROSTHETICS (INCLUDE WITH ORTHOTICS)    Abnormal gait  -     REFERRAL TO PROSTHETICS (INCLUDE WITH ORTHOTICS)    Osteopenia, unspecified location  -     DS-BONE DENSITY STUDY (DEXA); Future    S/p tibial fracture    Pathological fracture of left tibia, unspecified pathological cause, initial encounter  -     DS-BONE DENSITY STUDY (DEXA); Future    Former moderate cigarette smoker (10-19 per day)  -     DS-BONE DENSITY STUDY (DEXA); Future       We discussed checking a DEXA, particularly given her report of recent tibial fracture on the left.  Reviewed that she is taking calcium and vitamin D in divided doses twice a day.    Discussed continuing butrans 7.5mcg/hr.  She feels like this is helping with her pain and has no side effects.  She will continue to rotate patches.    We discussed consult to prosthetics about  KAFO for the right lower extremity.  Her axillary crutches help, but she quadriceps weakness is concerning.      Request records regarding her recent report of tibial fracture.        Follow-up:4 weeks      Please note that this dictation was created using voice recognition software. I have made every reasonable attempt to correct obvious errors but  there may be errors of grammar and content that I may have overlooked prior to finalization of this note.      Neftali Painter MD  Physical Medicine and Rehabilitation  Interventional Spine and Sports Physiatry  [facility]

## 2019-03-28 DIAGNOSIS — M81.0 OSTEOPOROSIS, UNSPECIFIED OSTEOPOROSIS TYPE, UNSPECIFIED PATHOLOGICAL FRACTURE PRESENCE: ICD-10-CM

## 2019-03-28 DIAGNOSIS — M80.00XA AGE-RELATED OSTEOPOROSIS WITH CURRENT PATHOLOGICAL FRACTURE, INITIAL ENCOUNTER: ICD-10-CM

## 2019-04-10 ENCOUNTER — HOSPITAL ENCOUNTER (OUTPATIENT)
Dept: RADIOLOGY | Facility: MEDICAL CENTER | Age: 69
End: 2019-04-10
Attending: PHYSICAL MEDICINE & REHABILITATION
Payer: MEDICARE

## 2019-04-10 DIAGNOSIS — M81.0 OSTEOPOROSIS, UNSPECIFIED OSTEOPOROSIS TYPE, UNSPECIFIED PATHOLOGICAL FRACTURE PRESENCE: ICD-10-CM

## 2019-04-10 DIAGNOSIS — M80.00XA AGE-RELATED OSTEOPOROSIS WITH CURRENT PATHOLOGICAL FRACTURE, INITIAL ENCOUNTER: ICD-10-CM

## 2019-04-10 PROCEDURE — 77080 DXA BONE DENSITY AXIAL: CPT

## 2019-04-16 ENCOUNTER — OFFICE VISIT (OUTPATIENT)
Dept: PHYSICAL MEDICINE AND REHAB | Facility: MEDICAL CENTER | Age: 69
End: 2019-04-16
Payer: MEDICARE

## 2019-04-16 ENCOUNTER — HOSPITAL ENCOUNTER (OUTPATIENT)
Dept: LAB | Facility: MEDICAL CENTER | Age: 69
End: 2019-04-16
Attending: PHYSICIAN ASSISTANT
Payer: MEDICARE

## 2019-04-16 VITALS
HEIGHT: 58 IN | TEMPERATURE: 98 F | HEART RATE: 100 BPM | OXYGEN SATURATION: 92 % | DIASTOLIC BLOOD PRESSURE: 82 MMHG | WEIGHT: 79.59 LBS | BODY MASS INDEX: 16.71 KG/M2 | SYSTOLIC BLOOD PRESSURE: 110 MMHG

## 2019-04-16 DIAGNOSIS — M41.25 OTHER IDIOPATHIC SCOLIOSIS, THORACOLUMBAR REGION: ICD-10-CM

## 2019-04-16 DIAGNOSIS — F11.90 CHRONIC, CONTINUOUS USE OF OPIOIDS: ICD-10-CM

## 2019-04-16 DIAGNOSIS — G14 POST-POLIO SYNDROME: ICD-10-CM

## 2019-04-16 DIAGNOSIS — M81.0 OSTEOPOROSIS, UNSPECIFIED OSTEOPOROSIS TYPE, UNSPECIFIED PATHOLOGICAL FRACTURE PRESENCE: ICD-10-CM

## 2019-04-16 DIAGNOSIS — M54.50 LOW BACK PAIN WITH RADIATION: ICD-10-CM

## 2019-04-16 LAB
ALBUMIN SERPL BCP-MCNC: 4.2 G/DL (ref 3.2–4.9)
ALBUMIN/GLOB SERPL: 1.2 G/DL
ALP SERPL-CCNC: 73 U/L (ref 30–99)
ALT SERPL-CCNC: 11 U/L (ref 2–50)
ANION GAP SERPL CALC-SCNC: 12 MMOL/L (ref 0–11.9)
AST SERPL-CCNC: 20 U/L (ref 12–45)
BASOPHILS # BLD AUTO: 0.4 % (ref 0–1.8)
BASOPHILS # BLD: 0.05 K/UL (ref 0–0.12)
BILIRUB SERPL-MCNC: 0.3 MG/DL (ref 0.1–1.5)
BUN SERPL-MCNC: 8 MG/DL (ref 8–22)
CALCIUM SERPL-MCNC: 9.3 MG/DL (ref 8.5–10.5)
CHLORIDE SERPL-SCNC: 103 MMOL/L (ref 96–112)
CO2 SERPL-SCNC: 22 MMOL/L (ref 20–33)
CREAT SERPL-MCNC: 0.38 MG/DL (ref 0.5–1.4)
EOSINOPHIL # BLD AUTO: 0.03 K/UL (ref 0–0.51)
EOSINOPHIL NFR BLD: 0.3 % (ref 0–6.9)
ERYTHROCYTE [DISTWIDTH] IN BLOOD BY AUTOMATED COUNT: 52 FL (ref 35.9–50)
FASTING STATUS PATIENT QL REPORTED: NORMAL
GLOBULIN SER CALC-MCNC: 3.6 G/DL (ref 1.9–3.5)
GLUCOSE SERPL-MCNC: 78 MG/DL (ref 65–99)
HCT VFR BLD AUTO: 38.7 % (ref 37–47)
HGB BLD-MCNC: 12 G/DL (ref 12–16)
IMM GRANULOCYTES # BLD AUTO: 0.02 K/UL (ref 0–0.11)
IMM GRANULOCYTES NFR BLD AUTO: 0.2 % (ref 0–0.9)
LYMPHOCYTES # BLD AUTO: 2.76 K/UL (ref 1–4.8)
LYMPHOCYTES NFR BLD: 24.6 % (ref 22–41)
MCH RBC QN AUTO: 28.5 PG (ref 27–33)
MCHC RBC AUTO-ENTMCNC: 31 G/DL (ref 33.6–35)
MCV RBC AUTO: 91.9 FL (ref 81.4–97.8)
MONOCYTES # BLD AUTO: 0.7 K/UL (ref 0–0.85)
MONOCYTES NFR BLD AUTO: 6.2 % (ref 0–13.4)
NEUTROPHILS # BLD AUTO: 7.67 K/UL (ref 2–7.15)
NEUTROPHILS NFR BLD: 68.3 % (ref 44–72)
NRBC # BLD AUTO: 0 K/UL
NRBC BLD-RTO: 0 /100 WBC
PLATELET # BLD AUTO: 322 K/UL (ref 164–446)
PMV BLD AUTO: 10.2 FL (ref 9–12.9)
POTASSIUM SERPL-SCNC: 4.1 MMOL/L (ref 3.6–5.5)
PROT SERPL-MCNC: 7.8 G/DL (ref 6–8.2)
RBC # BLD AUTO: 4.21 M/UL (ref 4.2–5.4)
SODIUM SERPL-SCNC: 137 MMOL/L (ref 135–145)
WBC # BLD AUTO: 11.2 K/UL (ref 4.8–10.8)

## 2019-04-16 PROCEDURE — 99214 OFFICE O/P EST MOD 30 MIN: CPT | Performed by: PHYSICAL MEDICINE & REHABILITATION

## 2019-04-16 PROCEDURE — 85025 COMPLETE CBC W/AUTO DIFF WBC: CPT

## 2019-04-16 PROCEDURE — 80053 COMPREHEN METABOLIC PANEL: CPT

## 2019-04-16 PROCEDURE — 36415 COLL VENOUS BLD VENIPUNCTURE: CPT

## 2019-04-16 RX ORDER — BUPRENORPHINE 7.5 UG/H
1 PATCH TRANSDERMAL
Qty: 4 PATCH | Refills: 0 | Status: SHIPPED | OUTPATIENT
Start: 2019-04-18 | End: 2019-05-16

## 2019-04-16 ASSESSMENT — PAIN SCALES - GENERAL: PAINLEVEL: 8=MODERATE-SEVERE PAIN

## 2019-04-16 ASSESSMENT — PATIENT HEALTH QUESTIONNAIRE - PHQ9
CLINICAL INTERPRETATION OF PHQ2 SCORE: 1
SUM OF ALL RESPONSES TO PHQ QUESTIONS 1-9: 3
5. POOR APPETITE OR OVEREATING: 1 - SEVERAL DAYS

## 2019-04-16 NOTE — PROGRESS NOTES
Follow-up patient note    Physiatry (physical medicine and  Rehabilitation), interventional spine and sports medicine    Date of Service: 04/16/2019    Chief complaint: Chronic pain    HISTORY    HPI: Jewell Skelton 68 y.o. female who presents today, accompanied by her daughter, for follow-up of pain related to her scoliosis, low back and history of post-polio.      Since the last visit, she has continued with the butrans 7.5mcg/hr patch.  No side effects.  No skin issues.  It helps with her pain, but does not completely eliminate it.  No constipation.    She continues to have pain in her low back that is primarily aching.  She wears her left KAFO.   She continues to use her axillary crutches.  While she denies falls, she still reports weakness in the right leg.  Her right leg has not been giving out on her.  She reports that she has been seen at [facility] and they are making a new left KAFO.  They recommend holding off on further bracing on the right for now, per the patient.    DEXA scan was done in the interim.    ORT: 15   reviewed.  Butrans 7.5mcg/hr filled 01/24/2019    PHQ9 -6 previously, now 3    Medical records review:  Previous treatments:    Physical Therapy: Yes    Medications the patient is tried: Narcotics, tylenol, muscle relaxants      Previous interventions: yes    Previous surgeries to relieve the above pain:  none      ROS: Unchanged from previous visit 02/19/2019 except as noted  Eyes: Wears glasses  ENT: history of post-nasal drip  Resp:COPD  GI:h/o ulcers, acid reflux, Hep C  Skin: skin cancer  Heme: history of bleeding    Red Flags ROS:   Fever, Chills, Sweats: Denies  Involuntary Weight Loss: Denies  Bladder Incontinence: Denies  Bowel Incontinence: denies  Saddle Anesthesia: Denies    All other systems reviewed and negative.       PMHx:   Past Medical History:   Diagnosis Date   • Abnormal radiologic finding of lung field 9/8/2017    History of bilateral pulmonary nodules   • Anesthesia      "patient's mom had side effects   • Arthritis 10/31/2018    \"everywhere\"   • Asthma    • Back pain    • Breath shortness    • Cancer (HCC) 2013    squamous cell   • Chickenpox    • COPD (chronic obstructive pulmonary disease) (HCC) 9/8/2017   • Dental disorder 10/31/2018    upper   • Emphysema of lung (HCC)    • Fatigue    • Pashto measles    • Heart burn    • Hepatitis C     dx 10/2018   • Hypertension 10/31/2018   • Influenza    • Mumps    • Polio    • Seizure (HCC)     \"possibly 1 time in 2000\"   • Wears glasses        PSHx:   Past Surgical History:   Procedure Laterality Date   • FINGER ARTHROPLASTY Right 11/2/2018    Procedure: FINGER ARTHROPLASTY - THUMB CARPOMETACARPAL FUSION VERSUS THUMB METACARPOPHALANGEAL FUSION, THUMB CARPOMETACARPAL EXCISIONAL ARTHROPLASTY, FLEXOR CARPI RADIALIS GRAFT;  Surgeon: Jerzy Ritchie M.D.;  Location: SURGERY SAME DAY [facility];  Service: Orthopedics   • CHOLECYSTECTOMY     • GASTROSTOMY     • OTHER ORTHOPEDIC SURGERY      Left ankle fusion, right foot surgery   • PRIMARY C SECTION      hysterectomy 1995   • TONSILLECTOMY         Family history   Family History   Problem Relation Age of Onset   • Diabetes Mother    • Lung Cancer Father          Medications:   Outpatient Prescriptions Marked as Taking for the 4/16/19 encounter (Office Visit) with Neftali Painter M.D.   Medication Sig Dispense Refill   • [START ON 4/18/2019] Buprenorphine (BUTRANS) 7.5 MCG/HR PATCH WEEKLY Apply 1 Patch to skin as directed every 7 days for 28 days. 4 Patch 0   • amLODIPine (NORVASC) 2.5 MG Tab 7.5 mg 1 time daily as needed. Takes 5mg and 2.5mg for total of 7.5mg  3   • mirtazapine (REMERON) 15 MG Tab TAKE ONCE TABLET BY MOUTH AT BEDTIME.  1   • BREO ELLIPTA 100-25 MCG/INH AEROSOL POWDER, BREATH ACTIVATED INHALE 1 PUFF BY MOUTH EVERY DAY. RINSE MOUTH AFTER USE. 1 Each 5   • albuterol 108 (90 Base) MCG/ACT Aero Soln inhalation aerosol Inhale 2 Puffs by mouth every 6 hours as needed for " "Shortness of Breath.     • acetaminophen (TYLENOL) 325 MG Tab Take 650 mg by mouth every four hours as needed.     • tiotropium (SPIRIVA HANDIHALER) 18 MCG Cap Inhale 18 mcg by mouth every day.     • Fluticasone Furoate-Vilanterol (BREO ELLIPTA) 100-25 MCG/INH AEROSOL POWDER, BREATH ACTIVATED Inhale 1 Puff by mouth every day. Rinse mouth after use. 1 Each 5   • busPIRone (BUSPAR) 30 MG tablet TAKE ONE TABLET BY MOUTH TWICE PER DAY.  2   • Calcium Carb-Cholecalciferol (CALCIUM 600 + D PO) Take 1 tablet by mouth every day.     • amlodipine (NORVASC) 5 MG Tab Take 5 mg by mouth every day.     • ranitidine (ZANTAC) 150 MG Tab Take 150 mg by mouth 2 times a day.     Amlodipine is 7.5mg      Allergies:   Allergies   Allergen Reactions   • Tape Unspecified     Rips off epidermis   • Ambien [Zolpidem] Unspecified     Memory loss   • Augmentin Nausea   • Bloodless      protocal per patient request; MD notified       Social Hx:   Social History     Social History   • Marital status:      Spouse name: N/A   • Number of children: N/A   • Years of education: N/A     Occupational History   • Not on file.     Social History Main Topics   • Smoking status: Current Every Day Smoker     Packs/day: 0.50     Years: 47.00     Types: Cigarettes   • Smokeless tobacco: Never Used      Comment: \"working on cutting down as a family\"   • Alcohol use No   • Drug use: No   • Sexual activity: Not on file     Other Topics Concern   •  Service No   • Blood Transfusions No   • Caffeine Concern No   • Occupational Exposure No   • Hobby Hazards No   • Sleep Concern No   • Stress Concern Yes   • Weight Concern No   • Special Diet No   • Back Care No   • Exercise Yes   • Bike Helmet No   • Seat Belt Yes   • Self-Exams Yes     Social History Narrative   • No narrative on file         EXAMINATION     Physical Exam:   Vitals: /82 (BP Location: Left arm, Patient Position: Sitting, BP Cuff Size: Adult)   Pulse 100   Temp 36.7 °C (98 " "°F) (Temporal)   Ht 1.473 m (4' 10\")   Wt 36.1 kg (79 lb 9.4 oz)   SpO2 92%     Constitutional:   Body Habitus: Body mass index is 16.63 kg/m².  Cooperation: Fully cooperates with exam  Appearance: Well-groomed, very thin, not disheveled, in no acute distress    Eyes: No scleral icterus, no proptosis     ENT -no obvious auditory deficits, no facial droop     Skin -no rashes or lesions noted     Respiratory-  breathing comfortable on room air, no audible wheezing    Cardiovascular- No lower extremity edema is noted.     Psychiatric- alert and oriented ×3. Normal affect.     Gait - Abnormal gait, left KAFO with no motion at the ankle or knee during gait, with crutches.  She does not alternate her crutches, but walks with the crutch and straight leg on each side.    Musculoskeletal -   Thoracic/Lumbar Spine/Sacral Spine/Hips   Inspection: Significant atrophy in bilateral lower extremities throughout   No significant muscle spasms in the lumbar spine.      Neuro     Motor Exam Lower Extremities    ? Myotome R L   Hip flexion L2 3 NT   Knee extension L3 3 NT   Knee flexion L5 3- NT   Ankle dorsiflexion L4 4- NT   Toe extension L5 3 NT   Ankle plantarflexion S1 4+ NT       Reflexes  ?  R L   Patella  0 NT   Achilles   0 NT       MEDICAL DECISION MAKING    Medical records review: see under HPI section.     DATA    Labs:   Lab Results   Component Value Date/Time    SODIUM 135 02/11/2019 02:38 PM    POTASSIUM 3.4 (L) 02/11/2019 02:38 PM    CHLORIDE 101 02/11/2019 02:38 PM    CO2 23 02/11/2019 02:38 PM    ANION 11.0 02/11/2019 02:38 PM    GLUCOSE 115 (H) 02/11/2019 02:38 PM    BUN 10 02/11/2019 02:38 PM    CREATININE 0.44 (L) 02/11/2019 02:38 PM    CALCIUM 9.3 02/11/2019 02:38 PM    ASTSGOT 19 02/11/2019 02:38 PM    ALTSGPT 8 02/11/2019 02:38 PM    TBILIRUBIN 1.7 (H) 02/11/2019 02:38 PM    ALBUMIN 3.8 02/11/2019 02:38 PM    TOTPROTEIN 8.3 (H) 02/11/2019 02:38 PM    GLOBULIN 4.5 (H) 02/11/2019 02:38 PM    AGRATIO 0.8 " 02/11/2019 02:38 PM          Lab Results   Component Value Date/Time    WBC 20.7 (H) 02/11/2019 02:38 PM    RBC 4.34 02/11/2019 02:38 PM    HEMOGLOBIN 12.5 02/11/2019 02:38 PM    HEMATOCRIT 41.2 02/11/2019 02:38 PM    MCV 94.9 02/11/2019 02:38 PM    MCH 28.8 02/11/2019 02:38 PM    MCHC 30.3 (L) 02/11/2019 02:38 PM    MPV 9.2 02/11/2019 02:38 PM    NEUTSPOLYS 80.40 (H) 02/11/2019 02:38 PM    LYMPHOCYTES 13.60 (L) 02/11/2019 02:38 PM    MONOCYTES 5.10 02/11/2019 02:38 PM    EOSINOPHILS 0.00 02/11/2019 02:38 PM    BASOPHILS 0.40 02/11/2019 02:38 PM       UDS 02/19/2019 Negative for substances, including buprenorphine.  This may test negative with use of butrans patch.    Imaging:  MRI lumbar spine: Reviewed, this is my interpretation   12/17/2018  There is note of multilevel disc bulges without central canal stenosis.  There is lumbar facet hypertrophy noted in the lumbar facets bilaterally.  Foraminal canal stenosis is moderate right L2-3, mild L4-5 bilaterally.    MRI lumbar spine report reviewed  12/17/2018 Multilevel degenerative changes of the lumbar spine as described                                                                                                                                                                    DEXA: 04/10/2019  According to the World Health Organization classification, bone mineral density of this patient is osteoporosis with high risk of fracture in the femoral region and normal in the lumbar region.    10-year Probability of Fracture:  Major Osteoporotic     21.1%  Hip     6.6%  Population      [place] ()    Based on left femur neck BMD      Diagnosis   Visit Diagnoses     ICD-10-CM   1. Other idiopathic scoliosis, thoracolumbar region M41.25   2. Low back pain with radiation M54.40   3. Osteoporosis, unspecified osteoporosis type, unspecified pathological fracture presence M81.0   4. Post-polio syndrome G14   5. Chronic, continuous use of opioids F11.90        ASSESSMENT:  Jewell Skelton 68 y.o. female with history of polio, scoliosis, low back pain with radiation     Jewell was seen today for follow-up.    Diagnoses and all orders for this visit:    Other idiopathic scoliosis, thoracolumbar region  -     Buprenorphine (BUTRANS) 7.5 MCG/HR PATCH WEEKLY; Apply 1 Patch to skin as directed every 7 days for 28 days.    Low back pain with radiation  -     Buprenorphine (BUTRANS) 7.5 MCG/HR PATCH WEEKLY; Apply 1 Patch to skin as directed every 7 days for 28 days.    Osteoporosis, unspecified osteoporosis type, unspecified pathological fracture presence    Post-polio syndrome    Chronic, continuous use of opioids       We discussed results of the DEXA.  She is taking vitamin D and calcium, but is not sure about taking replacement.  She reports that she took fosamax for 10-12 years.  Reviewed that she is taking calcium and vitamin D in divided doses twice a day.    Discussed continuing butrans 7.5mcg/hr.  She feels like this is helping with her pain and has no side effects.  She will continue to rotate patches.  Continue butrans.    We discussed consult to prosthetics about  KAFO.  Her axillary crutches help, but she has quadriceps weakness is concerning in the right leg.  She has not reported falls, though.  We will see how she does with the new KAFO and decrease of recurvatum on the left.    Follow-up:8 weeks      Please note that this dictation was created using voice recognition software. I have made every reasonable attempt to correct obvious errors but there may be errors of grammar and content that I may have overlooked prior to finalization of this note.      Neftali Painter MD  Physical Medicine and Rehabilitation  Interventional Spine and Sports Physiatry  [facility]

## 2019-05-16 ENCOUNTER — OFFICE VISIT (OUTPATIENT)
Dept: PHYSICAL MEDICINE AND REHAB | Facility: MEDICAL CENTER | Age: 69
End: 2019-05-16
Payer: MEDICARE

## 2019-05-16 VITALS
TEMPERATURE: 98.2 F | BODY MASS INDEX: 17.17 KG/M2 | WEIGHT: 81.79 LBS | HEIGHT: 58 IN | OXYGEN SATURATION: 91 % | SYSTOLIC BLOOD PRESSURE: 110 MMHG | HEART RATE: 100 BPM | DIASTOLIC BLOOD PRESSURE: 80 MMHG

## 2019-05-16 DIAGNOSIS — M54.50 LOW BACK PAIN WITH RADIATION: ICD-10-CM

## 2019-05-16 DIAGNOSIS — G14 POST-POLIO SYNDROME: ICD-10-CM

## 2019-05-16 DIAGNOSIS — F11.90 CHRONIC, CONTINUOUS USE OF OPIOIDS: ICD-10-CM

## 2019-05-16 DIAGNOSIS — M41.25 OTHER IDIOPATHIC SCOLIOSIS, THORACOLUMBAR REGION: ICD-10-CM

## 2019-05-16 DIAGNOSIS — R26.9 ABNORMAL GAIT: ICD-10-CM

## 2019-05-16 DIAGNOSIS — R29.898 RIGHT LEG WEAKNESS: ICD-10-CM

## 2019-05-16 DIAGNOSIS — M81.0 OSTEOPOROSIS, UNSPECIFIED OSTEOPOROSIS TYPE, UNSPECIFIED PATHOLOGICAL FRACTURE PRESENCE: ICD-10-CM

## 2019-05-16 PROCEDURE — 99214 OFFICE O/P EST MOD 30 MIN: CPT | Performed by: PHYSICAL MEDICINE & REHABILITATION

## 2019-05-16 RX ORDER — BUPRENORPHINE 10 UG/H
1 PATCH TRANSDERMAL
Qty: 4 PATCH | Refills: 0 | Status: SHIPPED | OUTPATIENT
Start: 2019-05-16 | End: 2019-06-13

## 2019-05-16 RX ORDER — RANITIDINE 300 MG/1
TABLET ORAL
Refills: 2 | COMMUNITY
Start: 2019-05-01 | End: 2020-02-20

## 2019-05-16 ASSESSMENT — PATIENT HEALTH QUESTIONNAIRE - PHQ9
SUM OF ALL RESPONSES TO PHQ QUESTIONS 1-9: 3
5. POOR APPETITE OR OVEREATING: 1 - SEVERAL DAYS
CLINICAL INTERPRETATION OF PHQ2 SCORE: 1

## 2019-05-16 ASSESSMENT — PAIN SCALES - GENERAL: PAINLEVEL: 8=MODERATE-SEVERE PAIN

## 2019-05-16 NOTE — PROGRESS NOTES
Follow-up patient note    Physiatry (physical medicine and  Rehabilitation), interventional spine and sports medicine    Date of Service: 05/16/2019    Chief complaint: Chronic pain    HISTORY    HPI: Jewell Skelton 68 y.o. female who presents today, accompanied by her daughter, for follow-up of pain related to her scoliosis, low back and history of post-polio.      Today, she is wearing her old KAFO.  She has a new left KAFO and has for about the last week.  It sounds like she has been having a hard time adjusting to it.  She does not report any skin issues, but it has been a little different in terms of how her gait feels to her.  It feels too tight, but the fact that the old one was loose likely contributed to her knee injury.    Since the last visit, she has continued with the butrans 7.5mcg/hr patch.  No side effects.  No skin issues.  It helps with her pain.  Her right hip pain seems to be bothering her a lot today, this has been worsening.  She has taken some tylenol today, it has helped a little bit.  Most days are not this bad.    She continues to have pain in her low back that is primarily aching.  She wears her left KAFO.   She continues to use her axillary crutches.  While she denies falls, she still reports weakness in the right leg.  Her right leg has not been giving out on her.     ORT: 15   reviewed.      PHQ9 -6 previously, now 3    Medical records review:  Previous treatments:    Physical Therapy: Yes    Medications the patient is tried: Narcotics, tylenol, muscle relaxants      Previous interventions: yes    Previous surgeries to relieve the above pain:  none      ROS: Unchanged from previous visit 04/16/2019 except as noted  Eyes: Wears glasses  ENT: history of post-nasal drip  Resp:COPD  GI:h/o ulcers, acid reflux, Hep C  Skin: skin cancer  Heme: history of bleeding    Red Flags ROS:   Fever, Chills, Sweats: Denies  Involuntary Weight Loss: Denies  Bladder Incontinence: Denies  Bowel  "Incontinence: denies  Saddle Anesthesia: Denies    All other systems reviewed and negative.       PMHx:   Past Medical History:   Diagnosis Date   • Abnormal radiologic finding of lung field 9/8/2017    History of bilateral pulmonary nodules   • Anesthesia     patient's mom had side effects   • Arthritis 10/31/2018    \"everywhere\"   • Asthma    • Back pain    • Breath shortness    • Cancer (HCC) 2013    squamous cell   • Chickenpox    • COPD (chronic obstructive pulmonary disease) (HCC) 9/8/2017   • Dental disorder 10/31/2018    upper   • Emphysema of lung (HCC)    • Fatigue    • Persian measles    • Heart burn    • Hepatitis C     dx 10/2018   • Hypertension 10/31/2018   • Influenza    • Mumps    • Polio    • Seizure (HCC)     \"possibly 1 time in 2000\"   • Wears glasses        PSHx:   Past Surgical History:   Procedure Laterality Date   • FINGER ARTHROPLASTY Right 11/2/2018    Procedure: FINGER ARTHROPLASTY - THUMB CARPOMETACARPAL FUSION VERSUS THUMB METACARPOPHALANGEAL FUSION, THUMB CARPOMETACARPAL EXCISIONAL ARTHROPLASTY, FLEXOR CARPI RADIALIS GRAFT;  Surgeon: Jerzy Ritchie M.D.;  Location: SURGERY SAME DAY [facility];  Service: Orthopedics   • CHOLECYSTECTOMY     • GASTROSTOMY     • OTHER ORTHOPEDIC SURGERY      Left ankle fusion, right foot surgery   • PRIMARY C SECTION      hysterectomy 1995   • TONSILLECTOMY         Family history   Family History   Problem Relation Age of Onset   • Diabetes Mother    • Lung Cancer Father          Medications:   Outpatient Prescriptions Marked as Taking for the 5/16/19 encounter (Office Visit) with Neftali Painter M.D.   Medication Sig Dispense Refill   • mupirocin (BACTROBAN) 2 % Ointment   1   • Buprenorphine 10 MCG/HR PATCH WEEKLY Apply 1 Patch to skin as directed every 7 days for 28 days. 4 Patch 0   • amLODIPine (NORVASC) 2.5 MG Tab 7.5 mg 1 time daily as needed. Takes 5mg and 2.5mg for total of 7.5mg  3   • mirtazapine (REMERON) 15 MG Tab TAKE ONCE TABLET BY " "MOUTH AT BEDTIME.  1   • BREO ELLIPTA 100-25 MCG/INH AEROSOL POWDER, BREATH ACTIVATED INHALE 1 PUFF BY MOUTH EVERY DAY. RINSE MOUTH AFTER USE. 1 Each 5   • albuterol 108 (90 Base) MCG/ACT Aero Soln inhalation aerosol Inhale 2 Puffs by mouth every 6 hours as needed for Shortness of Breath.     • acetaminophen (TYLENOL) 325 MG Tab Take 650 mg by mouth every four hours as needed.     • tiotropium (SPIRIVA HANDIHALER) 18 MCG Cap Inhale 18 mcg by mouth every day.     • Fluticasone Furoate-Vilanterol (BREO ELLIPTA) 100-25 MCG/INH AEROSOL POWDER, BREATH ACTIVATED Inhale 1 Puff by mouth every day. Rinse mouth after use. 1 Each 5   • busPIRone (BUSPAR) 30 MG tablet TAKE ONE TABLET BY MOUTH TWICE PER DAY.  2   • Calcium Carb-Cholecalciferol (CALCIUM 600 + D PO) Take 1 tablet by mouth every day.     • amlodipine (NORVASC) 5 MG Tab Take 5 mg by mouth every day.     Amlodipine is 7.5mg daily      Allergies:   Allergies   Allergen Reactions   • Tape Unspecified     Rips off epidermis   • Ambien [Zolpidem] Unspecified     Memory loss   • Augmentin Nausea   • Bloodless      protocal per patient request; MD notified       Social Hx:   Social History     Social History   • Marital status:      Spouse name: N/A   • Number of children: N/A   • Years of education: N/A     Occupational History   • Not on file.     Social History Main Topics   • Smoking status: Current Every Day Smoker     Packs/day: 0.50     Years: 47.00     Types: Cigarettes   • Smokeless tobacco: Never Used      Comment: \"working on cutting down as a family\"   • Alcohol use No   • Drug use: No   • Sexual activity: Not on file     Other Topics Concern   •  Service No   • Blood Transfusions No   • Caffeine Concern No   • Occupational Exposure No   • Hobby Hazards No   • Sleep Concern No   • Stress Concern Yes   • Weight Concern No   • Special Diet No   • Back Care No   • Exercise Yes   • Bike Helmet No   • Seat Belt Yes   • Self-Exams Yes     Social " "History Narrative   • No narrative on file         EXAMINATION     Physical Exam:   Vitals: /80 (BP Location: Left arm, Patient Position: Sitting, BP Cuff Size: Large adult)   Pulse 100   Temp 36.8 °C (98.2 °F) (Temporal)   Ht 1.473 m (4' 10\")   Wt 37.1 kg (81 lb 12.7 oz)   SpO2 91%     Constitutional:   Body Habitus: Body mass index is 17.09 kg/m².  Cooperation: Fully cooperates with exam  Appearance: Well-groomed, very thin, not disheveled, in no acute distress    Eyes: No scleral icterus, no proptosis     ENT -no obvious auditory deficits, no facial droop     Skin -no rashes or lesions noted     Respiratory-  breathing comfortable on room air, no audible wheezing    Cardiovascular- No lower extremity edema is noted.     Psychiatric- alert and oriented ×3. Normal affect.     Gait - Abnormal gait, left KAFO with no motion at the ankle or knee during gait, with crutches.  She does not alternate her crutches, but walks with the crutch and straight leg on each side.    Musculoskeletal -   Thoracic/Lumbar Spine/Sacral Spine/Hips   Inspection: Significant atrophy in bilateral lower extremities and pelvis throughout   No significant muscle spasms in the lumbar spine.      Neuro     Motor Exam Lower Extremities    ? Myotome R L   Hip flexion L2 3 NT   Knee extension L3 3 NT   Knee flexion L5 3- NT   Ankle dorsiflexion L4 4- NT   Toe extension L5 3 NT   Ankle plantarflexion S1 4+ NT       Reflexes  ?  R L   Patella  0 NT   Achilles   0 NT       MEDICAL DECISION MAKING    Medical records review: see under HPI section.     DATA    Labs:   Lab Results   Component Value Date/Time    SODIUM 137 04/16/2019 01:00 PM    POTASSIUM 4.1 04/16/2019 01:00 PM    CHLORIDE 103 04/16/2019 01:00 PM    CO2 22 04/16/2019 01:00 PM    ANION 12.0 (H) 04/16/2019 01:00 PM    GLUCOSE 78 04/16/2019 01:00 PM    BUN 8 04/16/2019 01:00 PM    CREATININE 0.38 (L) 04/16/2019 01:00 PM    CALCIUM 9.3 04/16/2019 01:00 PM    ASTSGOT 20 04/16/2019 " 01:00 PM    ALTSGPT 11 04/16/2019 01:00 PM    TBILIRUBIN 0.3 04/16/2019 01:00 PM    ALBUMIN 4.2 04/16/2019 01:00 PM    TOTPROTEIN 7.8 04/16/2019 01:00 PM    GLOBULIN 3.6 (H) 04/16/2019 01:00 PM    AGRATIO 1.2 04/16/2019 01:00 PM          Lab Results   Component Value Date/Time    WBC 11.2 (H) 04/16/2019 01:00 PM    RBC 4.21 04/16/2019 01:00 PM    HEMOGLOBIN 12.0 04/16/2019 01:00 PM    HEMATOCRIT 38.7 04/16/2019 01:00 PM    MCV 91.9 04/16/2019 01:00 PM    MCH 28.5 04/16/2019 01:00 PM    MCHC 31.0 (L) 04/16/2019 01:00 PM    MPV 10.2 04/16/2019 01:00 PM    NEUTSPOLYS 68.30 04/16/2019 01:00 PM    LYMPHOCYTES 24.60 04/16/2019 01:00 PM    MONOCYTES 6.20 04/16/2019 01:00 PM    EOSINOPHILS 0.30 04/16/2019 01:00 PM    BASOPHILS 0.40 04/16/2019 01:00 PM       UDS 02/19/2019 Negative for substances, including buprenorphine.  This may test negative with use of butrans patch.    Imaging:  MRI lumbar spine: Reviewed, this is my interpretation   12/17/2018  There is note of multilevel disc bulges without central canal stenosis.  There is lumbar facet hypertrophy noted in the lumbar facets bilaterally.  Foraminal canal stenosis is moderate right L2-3, mild L4-5 bilaterally.    MRI lumbar spine report reviewed  12/17/2018 Multilevel degenerative changes of the lumbar spine as described                                                                                                                                                                    DEXA: 04/10/2019  According to the World Health Organization classification, bone mineral density of this patient is osteoporosis with high risk of fracture in the femoral region and normal in the lumbar region.    10-year Probability of Fracture:  Major Osteoporotic     21.1%  Hip     6.6%  Population      [place] ()    Based on left femur neck BMD      Diagnosis   Visit Diagnoses     ICD-10-CM   1. Other idiopathic scoliosis, thoracolumbar region M41.25   2. Abnormal gait R26.9   3.  Post-polio syndrome G14   4. Chronic, continuous use of opioids F11.90   5. Osteoporosis, unspecified osteoporosis type, unspecified pathological fracture presence M81.0   6. Low back pain with radiation M54.40   7. Right leg weakness R29.898       ASSESSMENT:  Jewell Skelton 68 y.o. female with history of polio, scoliosis, low back pain with radiation     Jewell was seen today for follow-up.    Diagnoses and all orders for this visit:    Other idiopathic scoliosis, thoracolumbar region  -     REFERRAL TO PHYSICAL THERAPY Reason for Therapy: Eval/Treat/Report  -     Buprenorphine 10 MCG/HR PATCH WEEKLY; Apply 1 Patch to skin as directed every 7 days for 28 days.    Abnormal gait  -     REFERRAL TO PHYSICAL THERAPY Reason for Therapy: Eval/Treat/Report    Post-polio syndrome  -     REFERRAL TO PHYSICAL THERAPY Reason for Therapy: Eval/Treat/Report    Chronic, continuous use of opioids    Osteoporosis, unspecified osteoporosis type, unspecified pathological fracture presence    Low back pain with radiation  -     Buprenorphine 10 MCG/HR PATCH WEEKLY; Apply 1 Patch to skin as directed every 7 days for 28 days.    Right leg weakness       Continue calcium and vitamin D in divided doses twice a day.    Discussed increasing  butrans 10 mcg/hr.  She reports no side effects.  She will continue to rotate patches.  Refilled today.    We discussed consult to prosthetics about  ISAI.  Her axillary crutches help, but she has quadriceps weakness is concerning in the right leg.  This is likely contributing to her right-sided low back pain as she must propel her weak right leg.  We discussed trial of physical therapy to work on gait with new KAFO.  She is not anxious for a brace involving her right leg.  If this does not improve, we will reassess bracing on the right leg.          Follow-up:4 weeks      Please note that this dictation was created using voice recognition software. I have made every reasonable attempt to correct  obvious errors but there may be errors of grammar and content that I may have overlooked prior to finalization of this note.      Neftali Painter MD  Physical Medicine and Rehabilitation  Interventional Spine and Sports Physiatry  [facility]

## 2019-06-14 ENCOUNTER — OFFICE VISIT (OUTPATIENT)
Dept: PHYSICAL MEDICINE AND REHAB | Facility: MEDICAL CENTER | Age: 69
End: 2019-06-14
Payer: MEDICARE

## 2019-06-14 VITALS
HEART RATE: 100 BPM | BODY MASS INDEX: 15.97 KG/M2 | WEIGHT: 76.06 LBS | SYSTOLIC BLOOD PRESSURE: 130 MMHG | HEIGHT: 58 IN | OXYGEN SATURATION: 90 % | DIASTOLIC BLOOD PRESSURE: 70 MMHG | TEMPERATURE: 98.4 F

## 2019-06-14 DIAGNOSIS — M41.25 OTHER IDIOPATHIC SCOLIOSIS, THORACOLUMBAR REGION: ICD-10-CM

## 2019-06-14 DIAGNOSIS — F11.90 CHRONIC, CONTINUOUS USE OF OPIOIDS: ICD-10-CM

## 2019-06-14 DIAGNOSIS — R26.9 ABNORMAL GAIT: ICD-10-CM

## 2019-06-14 DIAGNOSIS — G14 POST-POLIO SYNDROME: ICD-10-CM

## 2019-06-14 DIAGNOSIS — R29.898 LEFT LEG WEAKNESS: ICD-10-CM

## 2019-06-14 DIAGNOSIS — R29.898 RIGHT LEG WEAKNESS: ICD-10-CM

## 2019-06-14 DIAGNOSIS — M81.0 OSTEOPOROSIS, UNSPECIFIED OSTEOPOROSIS TYPE, UNSPECIFIED PATHOLOGICAL FRACTURE PRESENCE: ICD-10-CM

## 2019-06-14 PROCEDURE — 99214 OFFICE O/P EST MOD 30 MIN: CPT | Performed by: PHYSICAL MEDICINE & REHABILITATION

## 2019-06-14 RX ORDER — BUPRENORPHINE 10 UG/H
1 PATCH TRANSDERMAL
Qty: 4 PATCH | Refills: 0 | Status: SHIPPED | OUTPATIENT
Start: 2019-06-14 | End: 2019-07-12 | Stop reason: SDUPTHER

## 2019-06-14 RX ORDER — LORATADINE 10 MG/1
10 TABLET ORAL DAILY
COMMUNITY

## 2019-06-14 RX ORDER — BUPRENORPHINE 10 UG/H
PATCH TRANSDERMAL
COMMUNITY
End: 2019-06-14 | Stop reason: SDUPTHER

## 2019-06-14 ASSESSMENT — PATIENT HEALTH QUESTIONNAIRE - PHQ9: CLINICAL INTERPRETATION OF PHQ2 SCORE: 0

## 2019-06-14 ASSESSMENT — PAIN SCALES - GENERAL: PAINLEVEL: 6=MODERATE PAIN

## 2019-06-14 NOTE — PROGRESS NOTES
Follow-up patient note    Physiatry (physical medicine and  Rehabilitation), interventional spine and sports medicine    Date of Service: 06/14/2019    Chief complaint: Chronic pain    HISTORY    HPI: Jewell Skelton 68 y.o. female who presents today, accompanied by her daughter, for follow-up of pain related to her scoliosis, low back and history of post-polio.      She has still been working on getting adjustments for her new KAFO.  Again, today, she is wearing her old KAFO.    She has started physical therapy.  It sounds like she has been having a hard time adjusting to it.  She and her daughter are hoping to do this next week.    Since the last visit, she has continued with the butrans 10mcg/hr patch.  No side effects.  She continues to rotate this and has no skin issues.  It helps with her pain and seems better than the previous dose.      Since the increase in the patch, she has been more mobile at home and doing more things in the house.  She used to lay in bed more and ask her daughter to do things for her.    Her right hip continues to give her some pain, but this is a little bit better.  Occasionally, she also takes tylenol.  It is not bothering her to put weight on it.  The back continues to be achy.  She has been sleeping on a camp mattress.    No falls. She wears her left KAFO.   She continues to use her axillary crutches.     ORT: 15   reviewed.      PHQ9 -0    Medical records review:  Previous treatments:    Physical Therapy: Yes    Medications the patient is tried: Narcotics, tylenol, muscle relaxants      Previous interventions: yes    Previous surgeries to relieve the above pain:  none      ROS: Unchanged from previous visit 05/16/2019 except as noted  Eyes: Wears glasses  ENT: history of post-nasal drip  Resp:COPD  GI:h/o ulcers, acid reflux, Hep C  Skin: skin cancer  Heme: history of bleeding    Red Flags ROS:   Fever, Chills, Sweats: Denies  Involuntary Weight Loss: Denies  Bladder Incontinence:  "Denies  Bowel Incontinence: denies  Saddle Anesthesia: Denies    All other systems reviewed and negative.       PMHx:   Past Medical History:   Diagnosis Date   • Abnormal radiologic finding of lung field 9/8/2017    History of bilateral pulmonary nodules   • Anesthesia     patient's mom had side effects   • Arthritis 10/31/2018    \"everywhere\"   • Asthma    • Back pain    • Breath shortness    • Cancer (HCC) 2013    squamous cell   • Chickenpox    • COPD (chronic obstructive pulmonary disease) (HCC) 9/8/2017   • Dental disorder 10/31/2018    upper   • Emphysema of lung (HCC)    • Fatigue    • [place] measles    • Heart burn    • Hepatitis C     dx 10/2018   • Hypertension 10/31/2018   • Influenza    • Mumps    • Polio    • Seizure (HCC)     \"possibly 1 time in 2000\"   • Wears glasses        PSHx:   Past Surgical History:   Procedure Laterality Date   • FINGER ARTHROPLASTY Right 11/2/2018    Procedure: FINGER ARTHROPLASTY - THUMB CARPOMETACARPAL FUSION VERSUS THUMB METACARPOPHALANGEAL FUSION, THUMB CARPOMETACARPAL EXCISIONAL ARTHROPLASTY, FLEXOR CARPI RADIALIS GRAFT;  Surgeon: Jerzy Ritchie M.D.;  Location: SURGERY SAME DAY [facility];  Service: Orthopedics   • CHOLECYSTECTOMY     • GASTROSTOMY     • OTHER ORTHOPEDIC SURGERY      Left ankle fusion, right foot surgery   • PRIMARY C SECTION      hysterectomy 1995   • TONSILLECTOMY         Family history   Family History   Problem Relation Age of Onset   • Diabetes Mother    • Lung Cancer Father          Medications:   Outpatient Prescriptions Marked as Taking for the 6/14/19 encounter (Office Visit) with Neftali Painter M.D.   Medication Sig Dispense Refill   • loratadine (CLARITIN) 10 MG Tab Take 10 mg by mouth every day.     • Buprenorphine 10 MCG/HR PATCH WEEKLY Apply 1 Patch to skin as directed every 7 days for 28 days. 4 Patch 0   • ranitidine (ZANTAC) 300 MG tablet TAKE ONE TAB BY MOUTH EVERY DAY.  2   • amLODIPine (NORVASC) 2.5 MG Tab 7.5 mg 1 time " "daily as needed. Takes 5mg and 2.5mg for total of 7.5mg  3   • mirtazapine (REMERON) 15 MG Tab TAKE ONCE TABLET BY MOUTH AT BEDTIME.  1   • BREO ELLIPTA 100-25 MCG/INH AEROSOL POWDER, BREATH ACTIVATED INHALE 1 PUFF BY MOUTH EVERY DAY. RINSE MOUTH AFTER USE. 1 Each 5   • albuterol 108 (90 Base) MCG/ACT Aero Soln inhalation aerosol Inhale 2 Puffs by mouth every 6 hours as needed for Shortness of Breath.     • acetaminophen (TYLENOL) 325 MG Tab Take 650 mg by mouth every four hours as needed.     • tiotropium (SPIRIVA HANDIHALER) 18 MCG Cap Inhale 18 mcg by mouth every day.     • Fluticasone Furoate-Vilanterol (BREO ELLIPTA) 100-25 MCG/INH AEROSOL POWDER, BREATH ACTIVATED Inhale 1 Puff by mouth every day. Rinse mouth after use. 1 Each 5   • busPIRone (BUSPAR) 30 MG tablet TAKE ONE TABLET BY MOUTH TWICE PER DAY.  2   • Calcium Carb-Cholecalciferol (CALCIUM 600 + D PO) Take 1 tablet by mouth every day.     • amlodipine (NORVASC) 5 MG Tab Take 5 mg by mouth every day.     Amlodipine is 7.5mg daily      Allergies:   Allergies   Allergen Reactions   • Tape Unspecified     Rips off epidermis   • Ambien [Zolpidem] Unspecified     Memory loss   • Augmentin Nausea   • Bloodless      protocal per patient request; MD notified       Social Hx:   Social History     Social History   • Marital status:      Spouse name: N/A   • Number of children: N/A   • Years of education: N/A     Occupational History   • Not on file.     Social History Main Topics   • Smoking status: Current Every Day Smoker     Packs/day: 0.50     Years: 47.00     Types: Cigarettes   • Smokeless tobacco: Never Used      Comment: \"working on cutting down as a family\"   • Alcohol use No   • Drug use: No   • Sexual activity: Not on file     Other Topics Concern   •  Service No   • Blood Transfusions No   • Caffeine Concern No   • Occupational Exposure No   • Hobby Hazards No   • Sleep Concern No   • Stress Concern Yes   • Weight Concern No   • Special " "Diet No   • Back Care No   • Exercise Yes   • Bike Helmet No   • Seat Belt Yes   • Self-Exams Yes     Social History Narrative   • No narrative on file         EXAMINATION     Physical Exam:   Vitals: /70 (BP Location: Right arm, Patient Position: Sitting, BP Cuff Size: Adult long)   Pulse 100   Temp 36.9 °C (98.4 °F) (Temporal)   Ht 1.473 m (4' 10\")   Wt 34.5 kg (76 lb 0.9 oz)   SpO2 90%     Constitutional:   Body Habitus: Body mass index is 15.9 kg/m².  Cooperation: Fully cooperates with exam  Appearance: Well-groomed, very thin, not disheveled, in no acute distress    Eyes: No scleral icterus, no proptosis     ENT -no obvious auditory deficits, no facial droop     Skin -no rashes or lesions noted     Respiratory-  breathing comfortable on room air, no audible wheezing    Cardiovascular- No lower extremity edema is noted.     Psychiatric- alert and oriented ×3. Normal affect.     Gait - Abnormal gait, left KAFO with no motion at the ankle or knee during gait, with crutches.  She does not alternate her crutches, but walks with the crutch and straight leg on each side.    Musculoskeletal -   Thoracic/Lumbar Spine/Sacral Spine/Hips   Inspection: Significant atrophy in bilateral lower extremities and pelvis throughout   No significant muscle spasms in the lumbar spine.      Neuro     Motor Exam Lower Extremities    ? Myotome R L   Hip flexion L2 3 NT   Knee extension L3 3 NT   Knee flexion L5 3- NT   Ankle dorsiflexion L4 4- NT   Toe extension L5 3 NT   Ankle plantarflexion S1 4+ NT       Reflexes  ?  R L   Patella  0 NT   Achilles   0 NT       MEDICAL DECISION MAKING    Medical records review: see under HPI section.     DATA    Labs:   Lab Results   Component Value Date/Time    SODIUM 137 04/16/2019 01:00 PM    POTASSIUM 4.1 04/16/2019 01:00 PM    CHLORIDE 103 04/16/2019 01:00 PM    CO2 22 04/16/2019 01:00 PM    ANION 12.0 (H) 04/16/2019 01:00 PM    GLUCOSE 78 04/16/2019 01:00 PM    BUN 8 04/16/2019 01:00 " PM    CREATININE 0.38 (L) 04/16/2019 01:00 PM    CALCIUM 9.3 04/16/2019 01:00 PM    ASTSGOT 20 04/16/2019 01:00 PM    ALTSGPT 11 04/16/2019 01:00 PM    TBILIRUBIN 0.3 04/16/2019 01:00 PM    ALBUMIN 4.2 04/16/2019 01:00 PM    TOTPROTEIN 7.8 04/16/2019 01:00 PM    GLOBULIN 3.6 (H) 04/16/2019 01:00 PM    AGRATIO 1.2 04/16/2019 01:00 PM          Lab Results   Component Value Date/Time    WBC 11.2 (H) 04/16/2019 01:00 PM    RBC 4.21 04/16/2019 01:00 PM    HEMOGLOBIN 12.0 04/16/2019 01:00 PM    HEMATOCRIT 38.7 04/16/2019 01:00 PM    MCV 91.9 04/16/2019 01:00 PM    MCH 28.5 04/16/2019 01:00 PM    MCHC 31.0 (L) 04/16/2019 01:00 PM    MPV 10.2 04/16/2019 01:00 PM    NEUTSPOLYS 68.30 04/16/2019 01:00 PM    LYMPHOCYTES 24.60 04/16/2019 01:00 PM    MONOCYTES 6.20 04/16/2019 01:00 PM    EOSINOPHILS 0.30 04/16/2019 01:00 PM    BASOPHILS 0.40 04/16/2019 01:00 PM       UDS 02/19/2019 Negative for substances, including buprenorphine.  This may test negative with use of butrans patch.    Imaging:  MRI lumbar spine: Reviewed, this is my interpretation   12/17/2018  There is note of multilevel disc bulges without central canal stenosis.  There is lumbar facet hypertrophy noted in the lumbar facets bilaterally.  Foraminal canal stenosis is moderate right L2-3, mild L4-5 bilaterally.    MRI lumbar spine report reviewed  12/17/2018 Multilevel degenerative changes of the lumbar spine as described                                                                                                                                                                    DEXA: 04/10/2019  According to the World Health Organization classification, bone mineral density of this patient is osteoporosis with high risk of fracture in the femoral region and normal in the lumbar region.    10-year Probability of Fracture:  Major Osteoporotic     21.1%  Hip     6.6%  Population      [place] ()    Based on left femur neck BMD      Diagnosis   Visit Diagnoses      ICD-10-CM   1. Other idiopathic scoliosis, thoracolumbar region M41.25   2. Abnormal gait R26.9   3. Post-polio syndrome G14   4. Chronic, continuous use of opioids F11.90   5. Osteoporosis, unspecified osteoporosis type, unspecified pathological fracture presence M81.0   6. Right leg weakness R29.898   7. Left leg weakness R29.898       ASSESSMENT:  Jewell Skelton 68 y.o. female with history of polio, scoliosis, low back pain with radiation     Jewell was seen today for follow-up.    Diagnoses and all orders for this visit:    Other idiopathic scoliosis, thoracolumbar region  -     Buprenorphine 10 MCG/HR PATCH WEEKLY; Apply 1 Patch to skin as directed every 7 days for 28 days.  -     Pain Management Screen    Abnormal gait    Post-polio syndrome  -     Buprenorphine 10 MCG/HR PATCH WEEKLY; Apply 1 Patch to skin as directed every 7 days for 28 days.  -     Pain Management Screen    Chronic, continuous use of opioids  -     Pain Management Screen    Osteoporosis, unspecified osteoporosis type, unspecified pathological fracture presence    Right leg weakness    Left leg weakness         Continue calcium and vitamin D in divided doses twice a day.    Continue butrans 10 mcg/hr.  She reports no side effects.  She will continue to rotate patches.  Refilled today.    We discussed consult to prosthetics about  ISAI.  Her axillary crutches help, but she has quadriceps weakness is concerning in the right leg. She is not anxious for a brace involving her right leg.  If this does not improve, we will reassess bracing on the right leg.      Plan to see her back after she gets the brace adjusted and does gait training with PT.      Follow-up:4 weeks      Please note that this dictation was created using voice recognition software. I have made every reasonable attempt to correct obvious errors but there may be errors of grammar and content that I may have overlooked prior to finalization of this note.      Neftali Painter,  MD  Physical Medicine and Rehabilitation  Interventional Spine and Sports Physiatry  [facility]

## 2019-06-19 ENCOUNTER — TELEPHONE (OUTPATIENT)
Dept: HEALTH INFORMATION MANAGEMENT | Facility: OTHER | Age: 69
End: 2019-06-19

## 2019-07-10 ENCOUNTER — PATIENT OUTREACH (OUTPATIENT)
Dept: OTHER | Facility: MEDICAL CENTER | Age: 69
End: 2019-07-10

## 2019-07-12 ENCOUNTER — TELEPHONE (OUTPATIENT)
Dept: HEALTH INFORMATION MANAGEMENT | Facility: OTHER | Age: 69
End: 2019-07-12

## 2019-07-12 ENCOUNTER — OFFICE VISIT (OUTPATIENT)
Dept: PHYSICAL MEDICINE AND REHAB | Facility: MEDICAL CENTER | Age: 69
End: 2019-07-12
Payer: MEDICARE

## 2019-07-12 VITALS
DIASTOLIC BLOOD PRESSURE: 84 MMHG | OXYGEN SATURATION: 94 % | HEIGHT: 58 IN | SYSTOLIC BLOOD PRESSURE: 110 MMHG | WEIGHT: 77.38 LBS | HEART RATE: 100 BPM | TEMPERATURE: 97.8 F | BODY MASS INDEX: 16.24 KG/M2

## 2019-07-12 DIAGNOSIS — R29.898 LEFT LEG WEAKNESS: ICD-10-CM

## 2019-07-12 DIAGNOSIS — G14 POST-POLIO SYNDROME: ICD-10-CM

## 2019-07-12 DIAGNOSIS — R26.9 ABNORMAL GAIT: ICD-10-CM

## 2019-07-12 DIAGNOSIS — T40.2X5A THERAPEUTIC OPIOID-INDUCED CONSTIPATION (OIC): ICD-10-CM

## 2019-07-12 DIAGNOSIS — K59.03 THERAPEUTIC OPIOID-INDUCED CONSTIPATION (OIC): ICD-10-CM

## 2019-07-12 DIAGNOSIS — F11.90 CHRONIC, CONTINUOUS USE OF OPIOIDS: ICD-10-CM

## 2019-07-12 DIAGNOSIS — M23.8X2 LATERAL COLLATERAL LIGAMENT DEFICIENCY OF LEFT KNEE: ICD-10-CM

## 2019-07-12 DIAGNOSIS — M41.25 OTHER IDIOPATHIC SCOLIOSIS, THORACOLUMBAR REGION: ICD-10-CM

## 2019-07-12 PROCEDURE — 99214 OFFICE O/P EST MOD 30 MIN: CPT | Performed by: PHYSICAL MEDICINE & REHABILITATION

## 2019-07-12 RX ORDER — BUPRENORPHINE 10 UG/H
1 PATCH TRANSDERMAL
Qty: 4 PATCH | Refills: 0 | Status: SHIPPED | OUTPATIENT
Start: 2019-07-12 | End: 2019-08-09 | Stop reason: SDUPTHER

## 2019-07-12 RX ORDER — POLYETHYLENE GLYCOL 3350 17 G/17G
17 POWDER, FOR SOLUTION ORAL
Qty: 1 BOTTLE | Refills: 2 | Status: SHIPPED | OUTPATIENT
Start: 2019-07-12

## 2019-07-12 ASSESSMENT — PATIENT HEALTH QUESTIONNAIRE - PHQ9
SUM OF ALL RESPONSES TO PHQ QUESTIONS 1-9: 4
5. POOR APPETITE OR OVEREATING: 0 - NOT AT ALL
CLINICAL INTERPRETATION OF PHQ2 SCORE: 1

## 2019-07-12 ASSESSMENT — PAIN SCALES - GENERAL: PAINLEVEL: 6=MODERATE PAIN

## 2019-07-12 NOTE — PROGRESS NOTES
Follow-up patient note    Physiatry (physical medicine and  Rehabilitation), interventional spine and sports medicine    Date of Service: 07/12/2019    Chief complaint: Chronic pain    HISTORY    HPI: Jewell Skelton 68 y.o. female who presents today, accompanied by her daughter, for follow-up of pain related to her scoliosis, low back and history of post-polio.      She has still been working on getting adjustments for her new KAFO, she brought this in today.  Again, today, she is wearing her old KAFO.    Physical therapy continues to be on hold.    Since the last visit, she has continued with the butrans 10mcg/hr patch.  No side effects.  She continues to rotate this and has no skin issues.  Helps make pain more manageable.  Bowel movements are usually every day.  She has miralax and takes this occasionally.    Her right hip continues to give her some pain, but this is a little bit better.  Occasionally, she also takes tylenol.  It is not bothering her to put weight on it.  The back continues to be achy.  She has been sleeping on a camp mattress.    No falls. She wears her left KAFO.   She continues to use her axillary crutches.     ORT: 15   reviewed.      PHQ9 -4    Medical records review:  Previous treatments:    Physical Therapy: Yes    Medications the patient is tried: Narcotics, tylenol, muscle relaxants      Previous interventions: yes    Previous surgeries to relieve the above pain:  none      ROS: Unchanged from previous visit 06/14/2019 except as noted  Eyes: Wears glasses  ENT: history of post-nasal drip  Resp:COPD  GI:h/o ulcers, acid reflux, Hep C  Skin: skin cancer  Heme: history of bleeding    Red Flags ROS:   Fever, Chills, Sweats: Denies  Involuntary Weight Loss: Denies  Bladder Incontinence: Denies  Bowel Incontinence: denies  Saddle Anesthesia: Denies    All other systems reviewed and negative.       PMHx:   Past Medical History:   Diagnosis Date   • Abnormal radiologic finding of lung field  "9/8/2017    History of bilateral pulmonary nodules   • Anesthesia     patient's mom had side effects   • Arthritis 10/31/2018    \"everywhere\"   • Asthma    • Back pain    • Breath shortness    • Cancer (HCC) 2013    squamous cell   • Chickenpox    • COPD (chronic obstructive pulmonary disease) ([facility]) 9/8/2017   • Dental disorder 10/31/2018    upper   • Emphysema of lung ([facility])    • Fatigue    • Sinhala measles    • Heart burn    • Hepatitis C     dx 10/2018   • Hypertension 10/31/2018   • Influenza    • Mumps    • Polio    • Seizure (HCC)     \"possibly 1 time in 2000\"   • Wears glasses        PSHx:   Past Surgical History:   Procedure Laterality Date   • FINGER ARTHROPLASTY Right 11/2/2018    Procedure: FINGER ARTHROPLASTY - THUMB CARPOMETACARPAL FUSION VERSUS THUMB METACARPOPHALANGEAL FUSION, THUMB CARPOMETACARPAL EXCISIONAL ARTHROPLASTY, FLEXOR CARPI RADIALIS GRAFT;  Surgeon: Jerzy Ritchie M.D.;  Location: SURGERY SAME DAY [facility];  Service: Orthopedics   • CHOLECYSTECTOMY     • GASTROSTOMY     • OTHER ORTHOPEDIC SURGERY      Left ankle fusion, right foot surgery   • PRIMARY C SECTION      hysterectomy 1995   • TONSILLECTOMY         Family history   Family History   Problem Relation Age of Onset   • Diabetes Mother    • Lung Cancer Father          Medications:   Outpatient Prescriptions Marked as Taking for the 7/12/19 encounter (Office Visit) with Neftali Painter M.D.   Medication Sig Dispense Refill   • Buprenorphine 10 MCG/HR PATCH WEEKLY Apply 1 Patch to skin as directed every 7 days for 28 days. 4 Patch 0   • polyethylene glycol 3350 (MIRALAX) Powder Take 17 g by mouth 1 time daily as needed. 1 Bottle 2   • loratadine (CLARITIN) 10 MG Tab Take 10 mg by mouth every day.     • ranitidine (ZANTAC) 300 MG tablet TAKE ONE TAB BY MOUTH EVERY DAY.  2   • mupirocin (BACTROBAN) 2 % Ointment   1   • amLODIPine (NORVASC) 2.5 MG Tab 7.5 mg 1 time daily as needed. Takes 5mg and 2.5mg for total of 7.5mg  3   • " "mirtazapine (REMERON) 15 MG Tab TAKE ONCE TABLET BY MOUTH AT BEDTIME.  1   • BREO ELLIPTA 100-25 MCG/INH AEROSOL POWDER, BREATH ACTIVATED INHALE 1 PUFF BY MOUTH EVERY DAY. RINSE MOUTH AFTER USE. 1 Each 5   • albuterol 108 (90 Base) MCG/ACT Aero Soln inhalation aerosol Inhale 2 Puffs by mouth every 6 hours as needed for Shortness of Breath.     • acetaminophen (TYLENOL) 325 MG Tab Take 650 mg by mouth every four hours as needed.     • tiotropium (SPIRIVA HANDIHALER) 18 MCG Cap Inhale 18 mcg by mouth every day.     • Fluticasone Furoate-Vilanterol (BREO ELLIPTA) 100-25 MCG/INH AEROSOL POWDER, BREATH ACTIVATED Inhale 1 Puff by mouth every day. Rinse mouth after use. 1 Each 5   • busPIRone (BUSPAR) 30 MG tablet TAKE ONE TABLET BY MOUTH TWICE PER DAY.  2   • Calcium Carb-Cholecalciferol (CALCIUM 600 + D PO) Take 1 tablet by mouth every day.     • amlodipine (NORVASC) 5 MG Tab Take 5 mg by mouth every day.     Amlodipine is 7.5mg daily      Allergies:   Allergies   Allergen Reactions   • Tape Unspecified     Rips off epidermis   • Ambien [Zolpidem] Unspecified     Memory loss   • Augmentin Nausea   • Bloodless      protocal per patient request; MD notified       Social Hx:   Social History     Social History   • Marital status:      Spouse name: N/A   • Number of children: N/A   • Years of education: N/A     Occupational History   • Not on file.     Social History Main Topics   • Smoking status: Current Every Day Smoker     Packs/day: 0.50     Years: 47.00     Types: Cigarettes   • Smokeless tobacco: Never Used      Comment: \"working on cutting down as a family\"   • Alcohol use No   • Drug use: No   • Sexual activity: Not on file     Other Topics Concern   •  Service No   • Blood Transfusions No   • Caffeine Concern No   • Occupational Exposure No   • Hobby Hazards No   • Sleep Concern No   • Stress Concern Yes   • Weight Concern No   • Special Diet No   • Back Care No   • Exercise Yes   • Bike Helmet No " "  • Seat Belt Yes   • Self-Exams Yes     Social History Narrative   • No narrative on file         EXAMINATION     Physical Exam:   Vitals: /84 (BP Location: Left arm, Patient Position: Sitting, BP Cuff Size: Adult long)   Pulse 100   Temp 36.6 °C (97.8 °F) (Temporal)   Ht 1.473 m (4' 10\")   Wt 35.1 kg (77 lb 6.1 oz)   SpO2 94%     Constitutional:   Body Habitus: Body mass index is 16.17 kg/m².  Cooperation: Fully cooperates with exam  Appearance: Well-groomed, very thin, not disheveled, in no acute distress    Eyes: No scleral icterus, no proptosis     ENT -no obvious auditory deficits, no facial droop     Skin -no rashes or lesions noted     Respiratory-  breathing comfortable on room air, no audible wheezing    Cardiovascular- No lower extremity edema is noted.     Psychiatric- alert and oriented ×3. Normal affect.     Gait - Abnormal gait, left KAFO with her old brace, she has minimal genu recurvatum.  The knee KAFO, she had significant genu recurvatum with excessive plantarflexion noted throughout the gait cycle.    Left knee: No medial instability.  Significant laxity of the lateral collateral ligament.  Mildly increased movement with posterior drawer testing.  Anterior drawer is negative on the left      MEDICAL DECISION MAKING    Medical records review: see under HPI section.     DATA    Labs:   Lab Results   Component Value Date/Time    SODIUM 137 04/16/2019 01:00 PM    POTASSIUM 4.1 04/16/2019 01:00 PM    CHLORIDE 103 04/16/2019 01:00 PM    CO2 22 04/16/2019 01:00 PM    ANION 12.0 (H) 04/16/2019 01:00 PM    GLUCOSE 78 04/16/2019 01:00 PM    BUN 8 04/16/2019 01:00 PM    CREATININE 0.38 (L) 04/16/2019 01:00 PM    CALCIUM 9.3 04/16/2019 01:00 PM    ASTSGOT 20 04/16/2019 01:00 PM    ALTSGPT 11 04/16/2019 01:00 PM    TBILIRUBIN 0.3 04/16/2019 01:00 PM    ALBUMIN 4.2 04/16/2019 01:00 PM    TOTPROTEIN 7.8 04/16/2019 01:00 PM    GLOBULIN 3.6 (H) 04/16/2019 01:00 PM    AGRATIO 1.2 04/16/2019 01:00 PM "          Lab Results   Component Value Date/Time    WBC 11.2 (H) 04/16/2019 01:00 PM    RBC 4.21 04/16/2019 01:00 PM    HEMOGLOBIN 12.0 04/16/2019 01:00 PM    HEMATOCRIT 38.7 04/16/2019 01:00 PM    MCV 91.9 04/16/2019 01:00 PM    MCH 28.5 04/16/2019 01:00 PM    MCHC 31.0 (L) 04/16/2019 01:00 PM    MPV 10.2 04/16/2019 01:00 PM    NEUTSPOLYS 68.30 04/16/2019 01:00 PM    LYMPHOCYTES 24.60 04/16/2019 01:00 PM    MONOCYTES 6.20 04/16/2019 01:00 PM    EOSINOPHILS 0.30 04/16/2019 01:00 PM    BASOPHILS 0.40 04/16/2019 01:00 PM       UDS 02/19/2019 Negative for substances, including buprenorphine.  This may test negative with use of butrans patch.    Imaging:  MRI lumbar spine: Reviewed, this is my interpretation   12/17/2018  There is note of multilevel disc bulges without central canal stenosis.  There is lumbar facet hypertrophy noted in the lumbar facets bilaterally.  Foraminal canal stenosis is moderate right L2-3, mild L4-5 bilaterally.    MRI lumbar spine report reviewed  12/17/2018 Multilevel degenerative changes of the lumbar spine as described                                                                                                                                                                    DEXA: 04/10/2019  According to the World Health Organization classification, bone mineral density of this patient is osteoporosis with high risk of fracture in the femoral region and normal in the lumbar region.    10-year Probability of Fracture:  Major Osteoporotic     21.1%  Hip     6.6%  Population      [place] ()    Based on left femur neck BMD      Diagnosis   Visit Diagnoses     ICD-10-CM   1. Other idiopathic scoliosis, thoracolumbar region M41.25   2. Abnormal gait R26.9   3. Post-polio syndrome G14   4. Chronic, continuous use of opioids F11.90   5. Left leg weakness R29.898   6. Therapeutic opioid-induced constipation (OIC) K59.03    T40.2X5A   7. 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 female with history of polio, scoliosis, low back pain with radiation     Jewell was seen today for follow-up.    Diagnoses and all orders for this visit:    Other idiopathic scoliosis, thoracolumbar region  -     Buprenorphine 10 MCG/HR PATCH WEEKLY; Apply 1 Patch to skin as directed every 7 days for 28 days.    Abnormal gait    Post-polio syndrome  -     Buprenorphine 10 MCG/HR PATCH WEEKLY; Apply 1 Patch to skin as directed every 7 days for 28 days.  -     REFERRAL TO COMPLEX CARE MANAGEMENT Services Requested: Care Manager to Evaluate and Recommend,     Chronic, continuous use of opioids    Left leg weakness    Therapeutic opioid-induced constipation (OIC)  -     polyethylene glycol 3350 (MIRALAX) Powder; Take 17 g by mouth 1 time daily as needed.    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    Continue butrans 10 mcg/hr.  She reports no side effects.  She will continue to rotate patches.  Refilled again today.    Discussed that she will follow-up with prosthetics about KAFO.  Adjustments are needed to control the knee on the left and adjust foot plate.  Will hold PT pending that.    Discussed lateral collateral instability.  Will hold off for now and see if we can control with revised KAFO.  Her old KAFO does help control her left knee more than the new one and she does not have pain walking with the old brace.  Okay for now.  Will consider diagnostic studies and other options, but the bracing should help support it.    Plan to see her back after she gets the brace adjusted and does gait training with PT.    Consult to social work re: daughter becoming caregiver.      Follow-up:4 weeks      Please note that this dictation was created using voice recognition software. I have made every reasonable attempt to correct obvious errors but there  may be errors of grammar and content that I may have overlooked prior to finalization of this note.      Neftali Painter MD  Physical Medicine and Rehabilitation  Interventional Spine and Sports Physiatry  [facility]

## 2019-08-09 ENCOUNTER — OFFICE VISIT (OUTPATIENT)
Dept: PHYSICAL MEDICINE AND REHAB | Facility: MEDICAL CENTER | Age: 69
End: 2019-08-09
Payer: MEDICARE

## 2019-08-09 VITALS
BODY MASS INDEX: 16.47 KG/M2 | HEIGHT: 58 IN | TEMPERATURE: 98.6 F | WEIGHT: 78.48 LBS | SYSTOLIC BLOOD PRESSURE: 124 MMHG | HEART RATE: 98 BPM | DIASTOLIC BLOOD PRESSURE: 70 MMHG | OXYGEN SATURATION: 95 %

## 2019-08-09 DIAGNOSIS — M41.25 OTHER IDIOPATHIC SCOLIOSIS, THORACOLUMBAR REGION: ICD-10-CM

## 2019-08-09 DIAGNOSIS — R26.9 ABNORMAL GAIT: ICD-10-CM

## 2019-08-09 DIAGNOSIS — G14 POST-POLIO SYNDROME: ICD-10-CM

## 2019-08-09 DIAGNOSIS — M81.0 OSTEOPOROSIS, UNSPECIFIED OSTEOPOROSIS TYPE, UNSPECIFIED PATHOLOGICAL FRACTURE PRESENCE: ICD-10-CM

## 2019-08-09 PROCEDURE — 99214 OFFICE O/P EST MOD 30 MIN: CPT | Performed by: PHYSICAL MEDICINE & REHABILITATION

## 2019-08-09 RX ORDER — BUPRENORPHINE 10 UG/H
1 PATCH TRANSDERMAL
Qty: 4 PATCH | Refills: 0 | Status: SHIPPED | OUTPATIENT
Start: 2019-08-10 | End: 2019-09-05 | Stop reason: SDUPTHER

## 2019-08-09 ASSESSMENT — PATIENT HEALTH QUESTIONNAIRE - PHQ9: CLINICAL INTERPRETATION OF PHQ2 SCORE: 0

## 2019-08-09 ASSESSMENT — PAIN SCALES - GENERAL: PAINLEVEL: 6=MODERATE PAIN

## 2019-08-09 NOTE — PROGRESS NOTES
Follow-up patient note    Physiatry (physical medicine and  Rehabilitation), interventional spine and sports medicine    Date of Service: 08/09/2019    Chief complaint: Chronic pain    HISTORY    HPI: Jewell Skelton 69 y.o. female who presents today, accompanied by her daughter, for follow-up of pain related to her scoliosis, low back and history of post-polio.      She has still been working on getting adjustments for her new KAFO.  Physical therapy continues to be on hold and this will start after she gets her new KAFO today.  She has her old one on now.  Pain is unchanged.      No falls.  She is using her axillary crutches    Since the last visit, she has continued with the butrans 10mcg/hr patch.  Rotating patches without difficulty.    Bowel movements are usually every day.  She has miralax and takes this occasionally.  This is manageable.    Her right hip continues to give her some pain, but this is a little bit better.  Occasionally, she also takes tylenol.  It is not bothering her to put weight on it.  The back continues to be achy.  She has been sleeping on a camp mattress.    ORT: 15   reviewed.      PHQ9 -0    Medical records review:  Previous treatments:    Physical Therapy: Yes    Medications the patient is tried: Narcotics, tylenol, muscle relaxants      Previous interventions: yes    Previous surgeries to relieve the above pain:  none      ROS: Unchanged from previous visit 07/12/2019 except as noted  Eyes: Wears glasses  ENT: history of post-nasal drip  Resp:COPD  GI:h/o ulcers, acid reflux, Hep C  Skin: skin cancer  Heme: history of bleeding    Red Flags ROS:   Fever, Chills, Sweats: Denies  Involuntary Weight Loss: Denies  Bladder Incontinence: Denies  Bowel Incontinence: denies  Saddle Anesthesia: Denies    All other systems reviewed and negative.       PMHx:   Past Medical History:   Diagnosis Date   • Abnormal radiologic finding of lung field 9/8/2017    History of bilateral pulmonary nodules  "  • Anesthesia     patient's mom had side effects   • Arthritis 10/31/2018    \"everywhere\"   • Asthma    • Back pain    • Breath shortness    • Cancer (HCC) 2013    squamous cell   • Chickenpox    • COPD (chronic obstructive pulmonary disease) (HCC) 9/8/2017   • Dental disorder 10/31/2018    upper   • Emphysema of lung (HCC)    • Fatigue    • Mongolian measles    • Heart burn    • Hepatitis C     dx 10/2018   • Hypertension 10/31/2018   • Influenza    • Mumps    • Polio    • Seizure (HCC)     \"possibly 1 time in 2000\"   • Wears glasses        PSHx:   Past Surgical History:   Procedure Laterality Date   • FINGER ARTHROPLASTY Right 11/2/2018    Procedure: FINGER ARTHROPLASTY - THUMB CARPOMETACARPAL FUSION VERSUS THUMB METACARPOPHALANGEAL FUSION, THUMB CARPOMETACARPAL EXCISIONAL ARTHROPLASTY, FLEXOR CARPI RADIALIS GRAFT;  Surgeon: Jerzy Ritchie M.D.;  Location: SURGERY SAME DAY [facility];  Service: Orthopedics   • CHOLECYSTECTOMY     • GASTROSTOMY     • OTHER ORTHOPEDIC SURGERY      Left ankle fusion, right foot surgery   • PRIMARY C SECTION      hysterectomy 1995   • TONSILLECTOMY         Family history   Family History   Problem Relation Age of Onset   • Diabetes Mother    • Lung Cancer Father          Medications:   Outpatient Medications Marked as Taking for the 8/9/19 encounter (Office Visit) with Neftali Painter M.D.   Medication Sig Dispense Refill   • Buprenorphine 10 MCG/HR PATCH WEEKLY Apply 1 Patch to skin as directed every 7 days for 28 days. 4 Patch 0   • polyethylene glycol 3350 (MIRALAX) Powder Take 17 g by mouth 1 time daily as needed. 1 Bottle 2   • loratadine (CLARITIN) 10 MG Tab Take 10 mg by mouth every day.     • ranitidine (ZANTAC) 300 MG tablet TAKE ONE TAB BY MOUTH EVERY DAY.  2   • mupirocin (BACTROBAN) 2 % Ointment   1   • amLODIPine (NORVASC) 2.5 MG Tab 7.5 mg 1 time daily as needed. Takes 5mg and 2.5mg for total of 7.5mg  3   • mirtazapine (REMERON) 15 MG Tab TAKE ONCE TABLET BY MOUTH " "AT BEDTIME.  1   • BREO ELLIPTA 100-25 MCG/INH AEROSOL POWDER, BREATH ACTIVATED INHALE 1 PUFF BY MOUTH EVERY DAY. RINSE MOUTH AFTER USE. 1 Each 5   • albuterol 108 (90 Base) MCG/ACT Aero Soln inhalation aerosol Inhale 2 Puffs by mouth every 6 hours as needed for Shortness of Breath.     • acetaminophen (TYLENOL) 325 MG Tab Take 650 mg by mouth every four hours as needed.     • tiotropium (SPIRIVA HANDIHALER) 18 MCG Cap Inhale 18 mcg by mouth every day.     • Fluticasone Furoate-Vilanterol (BREO ELLIPTA) 100-25 MCG/INH AEROSOL POWDER, BREATH ACTIVATED Inhale 1 Puff by mouth every day. Rinse mouth after use. 1 Each 5   • busPIRone (BUSPAR) 30 MG tablet TAKE ONE TABLET BY MOUTH TWICE PER DAY.  2   • Calcium Carb-Cholecalciferol (CALCIUM 600 + D PO) Take 1 tablet by mouth every day.     • amlodipine (NORVASC) 5 MG Tab Take 5 mg by mouth every day.           Allergies:   Allergies   Allergen Reactions   • Tape Unspecified     Rips off epidermis   • Ambien [Zolpidem] Unspecified     Memory loss   • Augmentin Nausea   • Bloodless      protocal per patient request; MD notified       Social Hx:   Social History     Socioeconomic History   • Marital status:      Spouse name: Not on file   • Number of children: Not on file   • Years of education: Not on file   • Highest education level: Not on file   Occupational History   • Not on file   Social Needs   • Financial resource strain: Not on file   • Food insecurity:     Worry: Not on file     Inability: Not on file   • Transportation needs:     Medical: Not on file     Non-medical: Not on file   Tobacco Use   • Smoking status: Current Every Day Smoker     Packs/day: 0.50     Years: 47.00     Pack years: 23.50     Types: Cigarettes   • Smokeless tobacco: Never Used   • Tobacco comment: \"working on cutting down as a family\"   Substance and Sexual Activity   • Alcohol use: No   • Drug use: No   • Sexual activity: Not on file   Lifestyle   • Physical activity:     Days per " "week: Not on file     Minutes per session: Not on file   • Stress: Not on file   Relationships   • Social connections:     Talks on phone: Not on file     Gets together: Not on file     Attends Buddhist service: Not on file     Active member of club or organization: Not on file     Attends meetings of clubs or organizations: Not on file     Relationship status: Not on file   • Intimate partner violence:     Fear of current or ex partner: Not on file     Emotionally abused: Not on file     Physically abused: Not on file     Forced sexual activity: Not on file   Other Topics Concern   •  Service No   • Blood Transfusions No   • Caffeine Concern No   • Occupational Exposure No   • Hobby Hazards No   • Sleep Concern No   • Stress Concern Yes   • Weight Concern No   • Special Diet No   • Back Care No   • Exercise Yes   • Bike Helmet No   • Seat Belt Yes   • Self-Exams Yes   Social History Narrative   • Not on file         EXAMINATION     Physical Exam:   Vitals: /70 (BP Location: Left arm, Patient Position: Sitting, BP Cuff Size: Small adult)   Pulse 98   Temp 37 °C (98.6 °F) (Temporal)   Ht 1.473 m (4' 10\")   Wt 35.6 kg (78 lb 7.7 oz)   SpO2 95%     Constitutional:   Body Habitus: Body mass index is 16.4 kg/m².  Cooperation: Fully cooperates with exam  Appearance: Well-groomed, very thin, not disheveled, in no acute distress    Eyes: No scleral icterus, no proptosis     ENT -no obvious auditory deficits, no facial droop     Skin -no rashes or lesions noted     Respiratory-  breathing comfortable on room air, no audible wheezing    Cardiovascular- No lower extremity edema is noted.     Psychiatric- alert and oriented ×3. Normal affect.     Gait - Abnormal gait, left KAFO and axillary crutches    Exam from 07/12/2019  Gait with her old KAFO brace, she has minimal genu recurvatum.  The knee KAFO, she had significant genu recurvatum with excessive plantarflexion noted throughout the gait cycle.    Left " knee: No medial instability.  Significant laxity of the lateral collateral ligament.  Mildly increased movement with posterior drawer testing.  Anterior drawer is negative on the left      MEDICAL DECISION MAKING    Medical records review: see under HPI section.     DATA    Labs:   Lab Results   Component Value Date/Time    SODIUM 137 04/16/2019 01:00 PM    POTASSIUM 4.1 04/16/2019 01:00 PM    CHLORIDE 103 04/16/2019 01:00 PM    CO2 22 04/16/2019 01:00 PM    ANION 12.0 (H) 04/16/2019 01:00 PM    GLUCOSE 78 04/16/2019 01:00 PM    BUN 8 04/16/2019 01:00 PM    CREATININE 0.38 (L) 04/16/2019 01:00 PM    CALCIUM 9.3 04/16/2019 01:00 PM    ASTSGOT 20 04/16/2019 01:00 PM    ALTSGPT 11 04/16/2019 01:00 PM    TBILIRUBIN 0.3 04/16/2019 01:00 PM    ALBUMIN 4.2 04/16/2019 01:00 PM    TOTPROTEIN 7.8 04/16/2019 01:00 PM    GLOBULIN 3.6 (H) 04/16/2019 01:00 PM    AGRATIO 1.2 04/16/2019 01:00 PM          Lab Results   Component Value Date/Time    WBC 11.2 (H) 04/16/2019 01:00 PM    RBC 4.21 04/16/2019 01:00 PM    HEMOGLOBIN 12.0 04/16/2019 01:00 PM    HEMATOCRIT 38.7 04/16/2019 01:00 PM    MCV 91.9 04/16/2019 01:00 PM    MCH 28.5 04/16/2019 01:00 PM    MCHC 31.0 (L) 04/16/2019 01:00 PM    MPV 10.2 04/16/2019 01:00 PM    NEUTSPOLYS 68.30 04/16/2019 01:00 PM    LYMPHOCYTES 24.60 04/16/2019 01:00 PM    MONOCYTES 6.20 04/16/2019 01:00 PM    EOSINOPHILS 0.30 04/16/2019 01:00 PM    BASOPHILS 0.40 04/16/2019 01:00 PM       UDS 02/19/2019 Negative for substances, including buprenorphine.  This may test negative with use of butrans patch.    Imaging:  MRI lumbar spine: Reviewed, this is my interpretation   12/17/2018  There is note of multilevel disc bulges without central canal stenosis.  There is lumbar facet hypertrophy noted in the lumbar facets bilaterally.  Foraminal canal stenosis is moderate right L2-3, mild L4-5 bilaterally.    MRI lumbar spine report reviewed  12/17/2018 Multilevel degenerative changes of the lumbar spine as  described                                                                                                                                                                    DEXA: 04/10/2019  According to the World Health Organization classification, bone mineral density of this patient is osteoporosis with high risk of fracture in the femoral region and normal in the lumbar region.    10-year Probability of Fracture:  Major Osteoporotic     21.1%  Hip     6.6%  Population      [place] ()    Based on left femur neck BMD      Diagnosis   Visit Diagnoses     ICD-10-CM   1. Other idiopathic scoliosis, thoracolumbar region M41.25   2. Post-polio syndrome G14   3. Osteoporosis, unspecified osteoporosis type, unspecified pathological fracture presence M81.0   4. Abnormal gait R26.9       ASSESSMENT:  Jewell Skelton 69 y.o. female with history of polio, scoliosis, low back pain with radiation     Jewell was seen today for follow-up.    Diagnoses and all orders for this visit:    Other idiopathic scoliosis, thoracolumbar region  -     Buprenorphine 10 MCG/HR PATCH WEEKLY; Apply 1 Patch to skin as directed every 7 days for 28 days.  -     Consent for Opiate Prescription    Post-polio syndrome  -     Buprenorphine 10 MCG/HR PATCH WEEKLY; Apply 1 Patch to skin as directed every 7 days for 28 days.  -     Consent for Opiate Prescription    Osteoporosis, unspecified osteoporosis type, unspecified pathological fracture presence    Abnormal gait         1. Continue calcium and vitamin D in divided doses twice a day.    2. Continue butrans 10 mcg/hr.  She reports no side effects.  She will continue to rotate patches.  Refilled again today.  Continue bowel program as needed.    3. Discussed that she will follow-up with prosthetics about KAFO. She will start PT after she gets her new KAFO.      Follow-up:4 weeks      Please note that this dictation was created using voice recognition software. I have made every reasonable attempt  to correct obvious errors but there may be errors of grammar and content that I may have overlooked prior to finalization of this note.      Neftali Painter MD  Physical Medicine and Rehabilitation  Interventional Spine and Sports Physiatry  [facility]

## 2019-08-20 ENCOUNTER — PATIENT OUTREACH (OUTPATIENT)
Dept: OTHER | Facility: MEDICAL CENTER | Age: 69
End: 2019-08-20

## 2019-09-05 ENCOUNTER — OFFICE VISIT (OUTPATIENT)
Dept: PHYSICAL MEDICINE AND REHAB | Facility: MEDICAL CENTER | Age: 69
End: 2019-09-05
Payer: MEDICARE

## 2019-09-05 VITALS
HEIGHT: 58 IN | OXYGEN SATURATION: 96 % | HEART RATE: 100 BPM | BODY MASS INDEX: 16.06 KG/M2 | TEMPERATURE: 98.2 F | WEIGHT: 76.5 LBS | DIASTOLIC BLOOD PRESSURE: 70 MMHG | SYSTOLIC BLOOD PRESSURE: 128 MMHG

## 2019-09-05 DIAGNOSIS — R26.9 ABNORMAL GAIT: ICD-10-CM

## 2019-09-05 DIAGNOSIS — Z71.6 TOBACCO ABUSE COUNSELING: ICD-10-CM

## 2019-09-05 DIAGNOSIS — G14 POST-POLIO SYNDROME: ICD-10-CM

## 2019-09-05 DIAGNOSIS — M81.0 OSTEOPOROSIS, UNSPECIFIED OSTEOPOROSIS TYPE, UNSPECIFIED PATHOLOGICAL FRACTURE PRESENCE: ICD-10-CM

## 2019-09-05 DIAGNOSIS — M41.25 OTHER IDIOPATHIC SCOLIOSIS, THORACOLUMBAR REGION: ICD-10-CM

## 2019-09-05 DIAGNOSIS — E55.9 VITAMIN D DEFICIENCY: ICD-10-CM

## 2019-09-05 DIAGNOSIS — Z72.0 TOBACCO USE: ICD-10-CM

## 2019-09-05 PROCEDURE — 99406 BEHAV CHNG SMOKING 3-10 MIN: CPT | Performed by: PHYSICAL MEDICINE & REHABILITATION

## 2019-09-05 PROCEDURE — 99214 OFFICE O/P EST MOD 30 MIN: CPT | Mod: 25 | Performed by: PHYSICAL MEDICINE & REHABILITATION

## 2019-09-05 RX ORDER — BUPRENORPHINE 10 UG/H
1 PATCH TRANSDERMAL
Qty: 4 PATCH | Refills: 0 | Status: SHIPPED | OUTPATIENT
Start: 2019-09-07 | End: 2019-10-05

## 2019-09-05 RX ORDER — BUPRENORPHINE 10 UG/H
1 PATCH TRANSDERMAL
Qty: 4 PATCH | Refills: 0 | Status: SHIPPED | OUTPATIENT
Start: 2019-10-05 | End: 2019-10-29 | Stop reason: SDUPTHER

## 2019-09-05 ASSESSMENT — PAIN SCALES - GENERAL: PAINLEVEL: 6=MODERATE PAIN

## 2019-09-05 ASSESSMENT — PATIENT HEALTH QUESTIONNAIRE - PHQ9: CLINICAL INTERPRETATION OF PHQ2 SCORE: 0

## 2019-09-05 NOTE — PROGRESS NOTES
Follow-up patient note    Physiatry (physical medicine and  Rehabilitation), interventional spine and sports medicine    Date of Service: 09/05/2019    Chief complaint: Chronic pain    HISTORY    HPI: Jewell Skelton 69 y.o. female who presents today, accompanied by her daughter, for follow-up of pain related to her scoliosis, low back and history of post-polio.      She has still been working on getting adjustments for her new KAFO.  She is doing better with her new KAFO.  No skin issues.  There are a few things that are difficult, getting into a truck is difficult.  She has a knee cage.  This has helped with genu recurvatum.  Gait seems better with adjustments.    No falls.  She is using her axillary crutches when ambulating    Since the last visit, she has continued with the butrans 10mcg/hr patch.  No problems with patches.  Rotating patches without difficulty.  No skin issues.    Bowel movements are usually every day.  She has miralax and takes this occasionally, only once in last few weeks.  This is manageable.    Her right hip continues to give her some pain.  This has been a little bit better than last visit, but intermittently is painful.    ORT: 15   reviewed.      PHQ9 -0    Medical records review:  Previous treatments:    Physical Therapy: Yes    Medications the patient is tried: Narcotics, tylenol, muscle relaxants      Previous interventions: yes    Previous surgeries to relieve the above pain:  none      ROS: Unchanged from previous visit 08/09/2019 except as noted  Eyes: Wears glasses  ENT: history of post-nasal drip  Resp:COPD  GI:h/o ulcers, acid reflux, Hep C  Skin: skin cancer  Heme: history of bleeding    Red Flags ROS:   Fever, Chills, Sweats: Denies  Involuntary Weight Loss: Denies  Bladder Incontinence: Denies  Bowel Incontinence: denies  Saddle Anesthesia: Denies    All other systems reviewed and negative.       PMHx:   Past Medical History:   Diagnosis Date   • Abnormal radiologic finding  "of lung field 9/8/2017    History of bilateral pulmonary nodules   • Anesthesia     patient's mom had side effects   • Arthritis 10/31/2018    \"everywhere\"   • Asthma    • Back pain    • Breath shortness    • Cancer (HCC) 2013    squamous cell   • Chickenpox    • COPD (chronic obstructive pulmonary disease) ([facility]) 9/8/2017   • Dental disorder 10/31/2018    upper   • Emphysema of lung (HCC)    • Fatigue    • [place] measles    • Heart burn    • Hepatitis C     dx 10/2018   • Hypertension 10/31/2018   • Influenza    • Mumps    • Polio    • Seizure (HCC)     \"possibly 1 time in 2000\"   • Wears glasses        PSHx:   Past Surgical History:   Procedure Laterality Date   • FINGER ARTHROPLASTY Right 11/2/2018    Procedure: FINGER ARTHROPLASTY - THUMB CARPOMETACARPAL FUSION VERSUS THUMB METACARPOPHALANGEAL FUSION, THUMB CARPOMETACARPAL EXCISIONAL ARTHROPLASTY, FLEXOR CARPI RADIALIS GRAFT;  Surgeon: Jerzy Ritchie M.D.;  Location: SURGERY SAME DAY [facility];  Service: Orthopedics   • CHOLECYSTECTOMY     • GASTROSTOMY     • OTHER ORTHOPEDIC SURGERY      Left ankle fusion, right foot surgery   • PRIMARY C SECTION      hysterectomy 1995   • TONSILLECTOMY         Family history   Family History   Problem Relation Age of Onset   • Diabetes Mother    • Lung Cancer Father          Medications:   Outpatient Medications Marked as Taking for the 9/5/19 encounter (Office Visit) with Neftali Painter M.D.   Medication Sig Dispense Refill   • [START ON 9/7/2019] Buprenorphine 10 MCG/HR PATCH WEEKLY Apply 1 Patch to skin as directed every 7 days for 28 days. 4 Patch 0   • [START ON 10/5/2019] Buprenorphine 10 MCG/HR PATCH WEEKLY Apply 1 Patch to skin as directed every 7 days for 28 days. 4 Patch 0   • polyethylene glycol 3350 (MIRALAX) Powder Take 17 g by mouth 1 time daily as needed. 1 Bottle 2   • loratadine (CLARITIN) 10 MG Tab Take 10 mg by mouth every day.     • ranitidine (ZANTAC) 300 MG tablet TAKE ONE TAB BY MOUTH EVERY DAY. "  2   • mupirocin (BACTROBAN) 2 % Ointment   1   • amLODIPine (NORVASC) 2.5 MG Tab 7.5 mg 1 time daily as needed. Takes 5mg and 2.5mg for total of 7.5mg  3   • mirtazapine (REMERON) 15 MG Tab TAKE ONCE TABLET BY MOUTH AT BEDTIME.  1   • BREO ELLIPTA 100-25 MCG/INH AEROSOL POWDER, BREATH ACTIVATED INHALE 1 PUFF BY MOUTH EVERY DAY. RINSE MOUTH AFTER USE. 1 Each 5   • albuterol 108 (90 Base) MCG/ACT Aero Soln inhalation aerosol Inhale 2 Puffs by mouth every 6 hours as needed for Shortness of Breath.     • acetaminophen (TYLENOL) 325 MG Tab Take 650 mg by mouth every four hours as needed.     • tiotropium (SPIRIVA HANDIHALER) 18 MCG Cap Inhale 18 mcg by mouth every day.     • Fluticasone Furoate-Vilanterol (BREO ELLIPTA) 100-25 MCG/INH AEROSOL POWDER, BREATH ACTIVATED Inhale 1 Puff by mouth every day. Rinse mouth after use. 1 Each 5   • busPIRone (BUSPAR) 30 MG tablet TAKE ONE TABLET BY MOUTH TWICE PER DAY.  2   • Calcium Carb-Cholecalciferol (CALCIUM 600 + D PO) Take 1 tablet by mouth every day.     • amlodipine (NORVASC) 5 MG Tab Take 5 mg by mouth every day.           Allergies:   Allergies   Allergen Reactions   • Tape Unspecified     Rips off epidermis   • Ambien [Zolpidem] Unspecified     Memory loss   • Augmentin Nausea   • Bloodless      protocal per patient request; MD notified       Social Hx:   Social History     Socioeconomic History   • Marital status:      Spouse name: Not on file   • Number of children: Not on file   • Years of education: Not on file   • Highest education level: Not on file   Occupational History   • Not on file   Social Needs   • Financial resource strain: Not on file   • Food insecurity:     Worry: Not on file     Inability: Not on file   • Transportation needs:     Medical: Not on file     Non-medical: Not on file   Tobacco Use   • Smoking status: Current Every Day Smoker     Packs/day: 0.50     Years: 47.00     Pack years: 23.50     Types: Cigarettes   • Smokeless tobacco:  "Never Used   • Tobacco comment: \"working on cutting down as a family\"   Substance and Sexual Activity   • Alcohol use: No   • Drug use: No   • Sexual activity: Not on file   Lifestyle   • Physical activity:     Days per week: Not on file     Minutes per session: Not on file   • Stress: Not on file   Relationships   • Social connections:     Talks on phone: Not on file     Gets together: Not on file     Attends [employer] service: Not on file     Active member of club or organization: Not on file     Attends meetings of clubs or organizations: Not on file     Relationship status: Not on file   • Intimate partner violence:     Fear of current or ex partner: Not on file     Emotionally abused: Not on file     Physically abused: Not on file     Forced sexual activity: Not on file   Other Topics Concern   •  Service No   • Blood Transfusions No   • Caffeine Concern No   • Occupational Exposure No   • Hobby Hazards No   • Sleep Concern No   • Stress Concern Yes   • Weight Concern No   • Special Diet No   • Back Care No   • Exercise Yes   • Bike Helmet No   • Seat Belt Yes   • Self-Exams Yes   Social History Narrative   • Not on file         EXAMINATION     Physical Exam:   Vitals: /70 (BP Location: Left arm, Patient Position: Sitting, BP Cuff Size: Small adult)   Pulse 100   Temp 36.8 °C (98.2 °F) (Temporal)   Ht 1.473 m (4' 10\")   Wt 34.7 kg (76 lb 8 oz)   SpO2 96%     Constitutional:   Body Habitus: Body mass index is 15.99 kg/m².  Cooperation: Fully cooperates with exam  Appearance: Well-groomed, very thin, not disheveled, in no acute distress    Eyes: No scleral icterus, no proptosis     ENT -no obvious auditory deficits, no facial droop     Skin -no rashes or lesions noted     Respiratory-  breathing comfortable on room air, no audible wheezing    Cardiovascular- No lower extremity edema is noted.     Psychiatric- alert and oriented ×3. Normal affect.     Gait - Abnormal gait, left KAFO and axillary " crutches.  Minimal genu recurvatum with posterior knee cage.      MEDICAL DECISION MAKING    Medical records review: see under HPI section.     DATA    Labs:   Lab Results   Component Value Date/Time    SODIUM 137 04/16/2019 01:00 PM    POTASSIUM 4.1 04/16/2019 01:00 PM    CHLORIDE 103 04/16/2019 01:00 PM    CO2 22 04/16/2019 01:00 PM    ANION 12.0 (H) 04/16/2019 01:00 PM    GLUCOSE 78 04/16/2019 01:00 PM    BUN 8 04/16/2019 01:00 PM    CREATININE 0.38 (L) 04/16/2019 01:00 PM    CALCIUM 9.3 04/16/2019 01:00 PM    ASTSGOT 20 04/16/2019 01:00 PM    ALTSGPT 11 04/16/2019 01:00 PM    TBILIRUBIN 0.3 04/16/2019 01:00 PM    ALBUMIN 4.2 04/16/2019 01:00 PM    TOTPROTEIN 7.8 04/16/2019 01:00 PM    GLOBULIN 3.6 (H) 04/16/2019 01:00 PM    AGRATIO 1.2 04/16/2019 01:00 PM          Lab Results   Component Value Date/Time    WBC 11.2 (H) 04/16/2019 01:00 PM    RBC 4.21 04/16/2019 01:00 PM    HEMOGLOBIN 12.0 04/16/2019 01:00 PM    HEMATOCRIT 38.7 04/16/2019 01:00 PM    MCV 91.9 04/16/2019 01:00 PM    MCH 28.5 04/16/2019 01:00 PM    MCHC 31.0 (L) 04/16/2019 01:00 PM    MPV 10.2 04/16/2019 01:00 PM    NEUTSPOLYS 68.30 04/16/2019 01:00 PM    LYMPHOCYTES 24.60 04/16/2019 01:00 PM    MONOCYTES 6.20 04/16/2019 01:00 PM    EOSINOPHILS 0.30 04/16/2019 01:00 PM    BASOPHILS 0.40 04/16/2019 01:00 PM       UDS 02/19/2019 Negative for substances, including buprenorphine.  This may test negative with use of butrans patch.  UDS 07/18/2019: Negative for buprenorphine.  Positive for kratom.    Imaging:  MRI lumbar spine: Reviewed, this is my interpretation   12/17/2018  There is note of multilevel disc bulges without central canal stenosis.  There is lumbar facet hypertrophy noted in the lumbar facets bilaterally.  Foraminal canal stenosis is moderate right L2-3, mild L4-5 bilaterally.    MRI lumbar spine report reviewed  12/17/2018 Multilevel degenerative changes of the lumbar spine as described                                                                                                                                                                     DEXA: 04/10/2019  According to the World Health Organization classification, bone mineral density of this patient is osteoporosis with high risk of fracture in the femoral region and normal in the lumbar region.    10-year Probability of Fracture:  Major Osteoporotic     21.1%  Hip     6.6%  Population      [place] ()    Based on left femur neck BMD      Diagnosis   Visit Diagnoses     ICD-10-CM   1. Other idiopathic scoliosis, thoracolumbar region M41.25   2. Post-polio syndrome G14   3. Tobacco abuse counseling Z71.6   4. Tobacco use Z72.0   5. Abnormal gait R26.9   6. Vitamin D deficiency E55.9   7. Osteoporosis, unspecified osteoporosis type, unspecified pathological fracture presence M81.0       ASSESSMENT:  Jewell Skelton 69 y.o. female with history of polio, scoliosis, low back pain with radiation     Jewell was seen today for follow-up.    Diagnoses and all orders for this visit:    Other idiopathic scoliosis, thoracolumbar region  -     Buprenorphine 10 MCG/HR PATCH WEEKLY; Apply 1 Patch to skin as directed every 7 days for 28 days.  -     Buprenorphine 10 MCG/HR PATCH WEEKLY; Apply 1 Patch to skin as directed every 7 days for 28 days.  -     Consent for Opiate Prescription  -     Controlled Substance Treatment Agreement  -     Pain Management Screen    Post-polio syndrome  -     Buprenorphine 10 MCG/HR PATCH WEEKLY; Apply 1 Patch to skin as directed every 7 days for 28 days.  -     Buprenorphine 10 MCG/HR PATCH WEEKLY; Apply 1 Patch to skin as directed every 7 days for 28 days.  -     Consent for Opiate Prescription  -     Controlled Substance Treatment Agreement  -     Pain Management Screen    Tobacco abuse counseling    Tobacco use    Abnormal gait    Vitamin D deficiency    Osteoporosis, unspecified osteoporosis type, unspecified pathological fracture presence         1. Continue calcium  and vitamin D in divided doses twice a day for osteoporosis and vitamin D deficiency.    2. Continue butrans 10 mcg/hr.  She reports no side effects.  She will continue to rotate patches.  Refilled again today with refill given for next month.  Continue bowel program as needed.    3. She is doing well with her new KAFO and does not feel that she needs more PT for this right now.  She and her daughter are going to have this adjusted, though, as the strap is loose on the knee cage.    4. Discussed that she is contemplating smoking cessation again.  She has patches and nicotine gum.  We discussed other possible resources and apps that might help with this.  She and her daughter are planning on doing this soon.  We spent 5 minutes discussing smoking cessation.    5. Repeat UDS today.  Discussed that she is not taking kratom.  She had tried this, as noted on UDS results from 07/2019.    Follow-up: 8 weeks    Please note that this dictation was created using voice recognition software. I have made every reasonable attempt to correct obvious errors but there may be errors of grammar and content that I may have overlooked prior to finalization of this note.      Neftali Painter MD  Physical Medicine and Rehabilitation  Interventional Spine and Sports Physiatry  [facility]

## 2019-09-18 ENCOUNTER — PATIENT OUTREACH (OUTPATIENT)
Dept: HEALTH INFORMATION MANAGEMENT | Facility: OTHER | Age: 69
End: 2019-09-18

## 2019-09-20 ENCOUNTER — HOSPITAL ENCOUNTER (OUTPATIENT)
Dept: LAB | Facility: MEDICAL CENTER | Age: 69
End: 2019-09-20
Attending: PHYSICIAN ASSISTANT
Payer: MEDICARE

## 2019-09-20 PROCEDURE — 36415 COLL VENOUS BLD VENIPUNCTURE: CPT

## 2019-09-20 PROCEDURE — 87522 HEPATITIS C REVRS TRNSCRPJ: CPT

## 2019-10-29 ENCOUNTER — OFFICE VISIT (OUTPATIENT)
Dept: PHYSICAL MEDICINE AND REHAB | Facility: MEDICAL CENTER | Age: 69
End: 2019-10-29
Payer: MEDICARE

## 2019-10-29 VITALS
WEIGHT: 81.79 LBS | OXYGEN SATURATION: 93 % | SYSTOLIC BLOOD PRESSURE: 128 MMHG | HEIGHT: 58 IN | TEMPERATURE: 98.2 F | HEART RATE: 100 BPM | DIASTOLIC BLOOD PRESSURE: 76 MMHG | BODY MASS INDEX: 17.17 KG/M2

## 2019-10-29 DIAGNOSIS — Z71.6 TOBACCO ABUSE COUNSELING: ICD-10-CM

## 2019-10-29 DIAGNOSIS — R26.9 ABNORMAL GAIT: ICD-10-CM

## 2019-10-29 DIAGNOSIS — F11.90 CHRONIC, CONTINUOUS USE OF OPIOIDS: ICD-10-CM

## 2019-10-29 DIAGNOSIS — M41.25 OTHER IDIOPATHIC SCOLIOSIS, THORACOLUMBAR REGION: ICD-10-CM

## 2019-10-29 DIAGNOSIS — M81.0 OSTEOPOROSIS, UNSPECIFIED OSTEOPOROSIS TYPE, UNSPECIFIED PATHOLOGICAL FRACTURE PRESENCE: ICD-10-CM

## 2019-10-29 DIAGNOSIS — G14 POST-POLIO SYNDROME: ICD-10-CM

## 2019-10-29 PROCEDURE — 99214 OFFICE O/P EST MOD 30 MIN: CPT | Performed by: PHYSICAL MEDICINE & REHABILITATION

## 2019-10-29 RX ORDER — BUPRENORPHINE 10 UG/H
1 PATCH TRANSDERMAL
Qty: 4 PATCH | Refills: 0 | Status: SHIPPED | OUTPATIENT
Start: 2019-11-26 | End: 2019-12-20 | Stop reason: SDUPTHER

## 2019-10-29 RX ORDER — BUPRENORPHINE 10 UG/H
1 PATCH TRANSDERMAL
Qty: 4 PATCH | Refills: 0 | Status: SHIPPED | OUTPATIENT
Start: 2019-11-02 | End: 2019-11-30

## 2019-10-29 ASSESSMENT — PATIENT HEALTH QUESTIONNAIRE - PHQ9: CLINICAL INTERPRETATION OF PHQ2 SCORE: 0

## 2019-10-29 ASSESSMENT — PAIN SCALES - GENERAL: PAINLEVEL: 6=MODERATE PAIN

## 2019-10-29 NOTE — PATIENT INSTRUCTIONS
I advise quitting smoking with multiple health benefits for you have those around you.  Please call [phone] or  visit our website at [email or web address] to see if you are a candidate for the QUIT tobacco program at [facility]

## 2019-10-29 NOTE — PROGRESS NOTES
Follow-up patient note    Physiatry (physical medicine and  Rehabilitation), interventional spine and sports medicine    Date of Service:10/29/2019    Chief complaint: Chronic pain    HISTORY    HPI: Jewell Skelton 69 y.o. female who presents today, accompanied by her daughter, for follow-up of pain related to her scoliosis, low back and history of post-polio.      She continues to have low back pain.  This is aching and she does not have any radicular symptoms.  Her KAFO has been working for her.  The brace seems to help with her gait, this is better than the other brace and controls genu recurvatum.  She does occasionally have left knee pain, but intermittently.  She still wears her old one at night and reports that she does this in case she has to go to the bathroom.      No significant knee pain.  No falls.  She uses axillary crutches to ambulate    Bowel movements are regular, daily or every other day.  She rarely takes miralax.    She continues butrans 10mcg/hr patch.  No skin issues.  She is rotating the patch.     Smoking about 13-14/day.  She and her daughter have discussed quitting.  Smoking inside is easier for her as going outside requires help getting back into the home.    ORT: 15   reviewed.      PHQ9 -0    Medical records review:  Previous treatments:    Physical Therapy: Yes, in the past    Medications the patient is tried: Narcotics, tylenol, muscle relaxants      Previous interventions: yes    Previous surgeries to relieve the above pain:  none      ROS: Unchanged from previous visit 09/05/2019 except as noted  Eyes: Wears glasses  ENT: history of post-nasal drip  Resp:COPD  GI:h/o ulcers, acid reflux, Hep C  Skin: skin cancer  Heme: history of bleeding    Red Flags ROS:   Fever, Chills, Sweats: Denies  Involuntary Weight Loss: Denies  Bladder Incontinence: Denies  Bowel Incontinence: denies  Saddle Anesthesia: Denies    All other systems reviewed and negative.       PMHx:   Past Medical History:  "  Diagnosis Date   • Abnormal radiologic finding of lung field 9/8/2017    History of bilateral pulmonary nodules   • Anesthesia     patient's mom had side effects   • Arthritis 10/31/2018    \"everywhere\"   • Asthma    • Back pain    • Breath shortness    • Cancer (HCC) 2013    squamous cell   • Chickenpox    • COPD (chronic obstructive pulmonary disease) (HCC) 9/8/2017   • Dental disorder 10/31/2018    upper   • Emphysema of lung (HCC)    • Fatigue    • [place]oc[place] measles    • Heart burn    • Hepatitis C     dx 10/2018   • Hypertension 10/31/2018   • Influenza    • Mumps    • Polio    • Seizure (HCC)     \"possibly 1 time in 2000\"   • Wears glasses        PSHx:   Past Surgical History:   Procedure Laterality Date   • FINGER ARTHROPLASTY Right 11/2/2018    Procedure: FINGER ARTHROPLASTY - THUMB CARPOMETACARPAL FUSION VERSUS THUMB METACARPOPHALANGEAL FUSION, THUMB CARPOMETACARPAL EXCISIONAL ARTHROPLASTY, FLEXOR CARPI RADIALIS GRAFT;  Surgeon: Jerzy Ritchie M.D.;  Location: SURGERY SAME DAY [facility];  Service: Orthopedics   • CHOLECYSTECTOMY     • GASTROSTOMY     • OTHER ORTHOPEDIC SURGERY      Left ankle fusion, right foot surgery   • PRIMARY C SECTION      hysterectomy 1995   • TONSILLECTOMY         Family history   Family History   Problem Relation Age of Onset   • Diabetes Mother    • Lung Cancer Father          Medications:   Outpatient Medications Marked as Taking for the 10/29/19 encounter (Office Visit) with Neftali Painter M.D.   Medication Sig Dispense Refill   • [START ON 11/2/2019] Buprenorphine 10 MCG/HR PATCH WEEKLY Apply 1 Patch to skin as directed every 7 days for 28 days. 4 Patch 0   • [START ON 11/26/2019] Buprenorphine 10 MCG/HR PATCH WEEKLY Apply 1 Patch to skin as directed every 7 days for 28 days. 4 Patch 0   • polyethylene glycol 3350 (MIRALAX) Powder Take 17 g by mouth 1 time daily as needed. 1 Bottle 2   • loratadine (CLARITIN) 10 MG Tab Take 10 mg by mouth every day.     • ranitidine " (ZANTAC) 300 MG tablet TAKE ONE TAB BY MOUTH EVERY DAY.  2   • mupirocin (BACTROBAN) 2 % Ointment   1   • amLODIPine (NORVASC) 2.5 MG Tab 7.5 mg 1 time daily as needed. Takes 5mg and 2.5mg for total of 7.5mg  3   • mirtazapine (REMERON) 15 MG Tab TAKE ONCE TABLET BY MOUTH AT BEDTIME.  1   • BREO ELLIPTA 100-25 MCG/INH AEROSOL POWDER, BREATH ACTIVATED INHALE 1 PUFF BY MOUTH EVERY DAY. RINSE MOUTH AFTER USE. 1 Each 5   • albuterol 108 (90 Base) MCG/ACT Aero Soln inhalation aerosol Inhale 2 Puffs by mouth every 6 hours as needed for Shortness of Breath.     • acetaminophen (TYLENOL) 325 MG Tab Take 650 mg by mouth every four hours as needed.     • tiotropium (SPIRIVA HANDIHALER) 18 MCG Cap Inhale 18 mcg by mouth every day.     • Fluticasone Furoate-Vilanterol (BREO ELLIPTA) 100-25 MCG/INH AEROSOL POWDER, BREATH ACTIVATED Inhale 1 Puff by mouth every day. Rinse mouth after use. 1 Each 5   • busPIRone (BUSPAR) 30 MG tablet TAKE ONE TABLET BY MOUTH TWICE PER DAY.  2   • Calcium Carb-Cholecalciferol (CALCIUM 600 + D PO) Take 1 tablet by mouth every day.     • amlodipine (NORVASC) 5 MG Tab Take 5 mg by mouth every day.           Allergies:   Allergies   Allergen Reactions   • Tape Unspecified     Rips off epidermis   • Ambien [Zolpidem] Unspecified     Memory loss   • Augmentin Nausea   • Bloodless      protocal per patient request; MD notified       Social Hx:   Social History     Socioeconomic History   • Marital status:      Spouse name: Not on file   • Number of children: Not on file   • Years of education: Not on file   • Highest education level: Not on file   Occupational History   • Not on file   Social Needs   • Financial resource strain: Not on file   • Food insecurity:     Worry: Not on file     Inability: Not on file   • Transportation needs:     Medical: Not on file     Non-medical: Not on file   Tobacco Use   • Smoking status: Current Every Day Smoker     Packs/day: 0.50     Years: 47.00     Pack years:  "23.50     Types: Cigarettes   • Smokeless tobacco: Never Used   • Tobacco comment: \"working on cutting down as a family\"   Substance and Sexual Activity   • Alcohol use: No   • Drug use: No   • Sexual activity: Not on file   Lifestyle   • Physical activity:     Days per week: Not on file     Minutes per session: Not on file   • Stress: Not on file   Relationships   • Social connections:     Talks on phone: Not on file     Gets together: Not on file     Attends Druze service: Not on file     Active member of club or organization: Not on file     Attends meetings of clubs or organizations: Not on file     Relationship status: Not on file   • Intimate partner violence:     Fear of current or ex partner: Not on file     Emotionally abused: Not on file     Physically abused: Not on file     Forced sexual activity: Not on file   Other Topics Concern   •  Service No   • Blood Transfusions No   • Caffeine Concern No   • Occupational Exposure No   • Hobby Hazards No   • Sleep Concern No   • Stress Concern Yes   • Weight Concern No   • Special Diet No   • Back Care No   • Exercise Yes   • Bike Helmet No   • Seat Belt Yes   • Self-Exams Yes   Social History Narrative   • Not on file         EXAMINATION     Physical Exam:   Vitals: /76 (BP Location: Left arm, Patient Position: Sitting, BP Cuff Size: Small adult)   Pulse 100   Temp 36.8 °C (98.2 °F) (Temporal)   Ht 1.473 m (4' 10\")   Wt 37.1 kg (81 lb 12.7 oz)   SpO2 93%     Constitutional:   Body Habitus: Body mass index is 17.09 kg/m².  Cooperation: Fully cooperates with exam  Appearance: Well-groomed, very thin, not disheveled, in no acute distress    Eyes: No scleral icterus, no proptosis     ENT -no obvious auditory deficits, no facial droop     Skin -no rashes or lesions noted     Respiratory-  breathing comfortable on room air, no audible wheezing    Cardiovascular- No lower extremity edema is noted.     Psychiatric- alert and oriented ×3. Normal " affect.     Gait - Abnormal gait, left KAFO and axillary crutches.       MEDICAL DECISION MAKING    Medical records review: see under HPI section.     DATA    Labs:   Lab Results   Component Value Date/Time    SODIUM 137 04/16/2019 01:00 PM    POTASSIUM 4.1 04/16/2019 01:00 PM    CHLORIDE 103 04/16/2019 01:00 PM    CO2 22 04/16/2019 01:00 PM    ANION 12.0 (H) 04/16/2019 01:00 PM    GLUCOSE 78 04/16/2019 01:00 PM    BUN 8 04/16/2019 01:00 PM    CREATININE 0.38 (L) 04/16/2019 01:00 PM    CALCIUM 9.3 04/16/2019 01:00 PM    ASTSGOT 20 04/16/2019 01:00 PM    ALTSGPT 11 04/16/2019 01:00 PM    TBILIRUBIN 0.3 04/16/2019 01:00 PM    ALBUMIN 4.2 04/16/2019 01:00 PM    TOTPROTEIN 7.8 04/16/2019 01:00 PM    GLOBULIN 3.6 (H) 04/16/2019 01:00 PM    AGRATIO 1.2 04/16/2019 01:00 PM          Lab Results   Component Value Date/Time    WBC 11.2 (H) 04/16/2019 01:00 PM    RBC 4.21 04/16/2019 01:00 PM    HEMOGLOBIN 12.0 04/16/2019 01:00 PM    HEMATOCRIT 38.7 04/16/2019 01:00 PM    MCV 91.9 04/16/2019 01:00 PM    MCH 28.5 04/16/2019 01:00 PM    MCHC 31.0 (L) 04/16/2019 01:00 PM    MPV 10.2 04/16/2019 01:00 PM    NEUTSPOLYS 68.30 04/16/2019 01:00 PM    LYMPHOCYTES 24.60 04/16/2019 01:00 PM    MONOCYTES 6.20 04/16/2019 01:00 PM    EOSINOPHILS 0.30 04/16/2019 01:00 PM    BASOPHILS 0.40 04/16/2019 01:00 PM       UDS 02/19/2019 Negative for substances, including buprenorphine.  This may test negative with use of butrans patch.  UDS 07/18/2019: Negative for buprenorphine.  Positive for kratom.  UDS 09/05/2019 Negative for buprenorphine.  Positive for low doses of kratom    Imaging:  MRI lumbar spine: Reviewed, this is my interpretation   12/17/2018  There is note of multilevel disc bulges without central canal stenosis.  There is lumbar facet hypertrophy noted in the lumbar facets bilaterally.  Foraminal canal stenosis is moderate right L2-3, mild L4-5 bilaterally.    MRI lumbar spine report reviewed  12/17/2018 Multilevel degenerative changes  of the lumbar spine as described                                                                                                                                                                    DEXA: 04/10/2019  According to the World Health Organization classification, bone mineral density of this patient is osteoporosis with high risk of fracture in the femoral region and normal in the lumbar region.    10-year Probability of Fracture:  Major Osteoporotic     21.1%  Hip     6.6%  Population      [place] ()    Based on left femur neck BMD      Diagnosis   Visit Diagnoses     ICD-10-CM   1. Other idiopathic scoliosis, thoracolumbar region M41.25   2. Post-polio syndrome G14   3. Tobacco abuse counseling Z71.6   4. Abnormal gait R26.9   5. Chronic, continuous use of opioids F11.90   6. Osteoporosis, unspecified osteoporosis type, unspecified pathological fracture presence M81.0       ASSESSMENT:  Jewell Skelton 69 y.o. female with history of polio, scoliosis, low back pain with radiation     Jewell was seen today for follow-up.    Diagnoses and all orders for this visit:    Other idiopathic scoliosis, thoracolumbar region  -     Pain Management Screen  -     Buprenorphine 10 MCG/HR PATCH WEEKLY; Apply 1 Patch to skin as directed every 7 days for 28 days.  -     Buprenorphine 10 MCG/HR PATCH WEEKLY; Apply 1 Patch to skin as directed every 7 days for 28 days.  -     Consent for Opiate Prescription  -     Controlled Substance Treatment Agreement    Post-polio syndrome  -     Pain Management Screen  -     Buprenorphine 10 MCG/HR PATCH WEEKLY; Apply 1 Patch to skin as directed every 7 days for 28 days.  -     Buprenorphine 10 MCG/HR PATCH WEEKLY; Apply 1 Patch to skin as directed every 7 days for 28 days.  -     Consent for Opiate Prescription  -     Controlled Substance Treatment Agreement    Tobacco abuse counseling    Abnormal gait    Chronic, continuous use of opioids    Osteoporosis, unspecified osteoporosis  type, unspecified pathological fracture presence         1. Plan to continue calcium and vitamin D in divided doses twice a day for osteoporosis and vitamin D deficiency.    2. Continue butrans 10 mcg/hr.  She reports no side effects.  She will continue to rotate patches.  Continue bowel program as needed.  Medications refilled.  Discussed that she reports that she has discontinued kratom.  Low doses on last UDS, see below.  Plan to repeat.     3. She is doing well with her new KAFO and she is doing well with this.    4. Discussed that she is contemplating smoking cessation again.  She has patches and nicotine gum.  I advise quitting smoking with multiple health benefits for you have those around you. Please call [phone] or  visit our website at [email or web address] to see if you are a candidate for the QUIT tobacco program at [facility]. .     She and her daughter are planning on doing this soon.  We spent 3 minutes discussing smoking cessation.    5. Repeat UDS today.  Discussed that she is not taking kratom.   The levels were low at last check.  Repeat today.    Follow-up: Follow-up in 8 weeks    Please note that this dictation was created using voice recognition software. I have made every reasonable attempt to correct obvious errors but there may be errors of grammar and content that I may have overlooked prior to finalization of this note.      Neftali Painter MD  Physical Medicine and Rehabilitation  Interventional Spine and Sports Physiatry  [facility]

## 2019-12-20 ENCOUNTER — OFFICE VISIT (OUTPATIENT)
Dept: PHYSICAL MEDICINE AND REHAB | Facility: MEDICAL CENTER | Age: 69
End: 2019-12-20
Payer: MEDICARE

## 2019-12-20 VITALS
DIASTOLIC BLOOD PRESSURE: 80 MMHG | BODY MASS INDEX: 17.4 KG/M2 | OXYGEN SATURATION: 93 % | WEIGHT: 82.89 LBS | HEART RATE: 100 BPM | SYSTOLIC BLOOD PRESSURE: 124 MMHG | TEMPERATURE: 98.7 F | HEIGHT: 58 IN

## 2019-12-20 DIAGNOSIS — F11.90 CHRONIC, CONTINUOUS USE OF OPIOIDS: ICD-10-CM

## 2019-12-20 DIAGNOSIS — R26.9 ABNORMAL GAIT: ICD-10-CM

## 2019-12-20 DIAGNOSIS — G14 POST-POLIO SYNDROME: ICD-10-CM

## 2019-12-20 DIAGNOSIS — Z72.0 TOBACCO USE: ICD-10-CM

## 2019-12-20 DIAGNOSIS — M81.0 OSTEOPOROSIS, UNSPECIFIED OSTEOPOROSIS TYPE, UNSPECIFIED PATHOLOGICAL FRACTURE PRESENCE: ICD-10-CM

## 2019-12-20 DIAGNOSIS — E55.9 VITAMIN D DEFICIENCY: ICD-10-CM

## 2019-12-20 DIAGNOSIS — M54.50 LOW BACK PAIN WITH RADIATION: ICD-10-CM

## 2019-12-20 DIAGNOSIS — M41.25 OTHER IDIOPATHIC SCOLIOSIS, THORACOLUMBAR REGION: ICD-10-CM

## 2019-12-20 PROCEDURE — 99214 OFFICE O/P EST MOD 30 MIN: CPT | Mod: 25 | Performed by: PHYSICAL MEDICINE & REHABILITATION

## 2019-12-20 RX ORDER — BUPRENORPHINE 10 UG/H
1 PATCH TRANSDERMAL
Qty: 4 PATCH | Refills: 0 | Status: SHIPPED | OUTPATIENT
Start: 2019-12-28 | End: 2020-01-25

## 2019-12-20 RX ORDER — BUPRENORPHINE 10 UG/H
1 PATCH TRANSDERMAL
Qty: 4 PATCH | Refills: 0 | Status: SHIPPED | OUTPATIENT
Start: 2020-01-25 | End: 2020-02-20 | Stop reason: SDUPTHER

## 2019-12-20 ASSESSMENT — PAIN SCALES - GENERAL: PAINLEVEL: 6=MODERATE PAIN

## 2019-12-20 NOTE — PROGRESS NOTES
Follow-up patient note    Physiatry (physical medicine and  Rehabilitation), interventional spine and sports medicine    Date of Service:12/20/2019    Chief complaint: Chronic pain    HISTORY    HPI: Jewell Skelton 69 y.o. female who presents today, accompanied by her daughter, for follow-up of pain related to her scoliosis, low back and history of post-polio.      Since the last visit, she reports that her pain is about the same.  Her back continues to be somewhat painful, but it is manageable.  Occasionally, she has pain in the left shin and right posterior gluteal and back.  It does seem like the new brace is helping.  She is wearing her new brace more, but her old brace, she still wears the old one at night.    Butrans patch 10mcg/hr, no skin changes.  Bowel movements are regular, daily or every other day.  She rarely takes miralax and has this at home if she needs to use it.    She continues butrans 10mcg/hr patch.  No skin issues.  She is rotating the patch.     She and her daughter have discussed quitting smoking.  Smoking inside is easier for her as going outside requires help getting back into the home.  She is smoking less, but is not ready to quit yet.  We discussed that they may be moving and plan to only smoke outside, when they move.    ORT: 15   reviewed.      PHQ9 -4    Medical records review:  Previous treatments:    Physical Therapy: Yes, in the past    Medications the patient is tried: Narcotics, tylenol, muscle relaxants      Previous interventions: yes    Previous surgeries to relieve the above pain:  none      ROS: Unchanged from previous visit 10/29/2019 except as noted  Eyes: Wears glasses  ENT: history of post-nasal drip  Resp:COPD  GI:h/o ulcers, acid reflux, Hep C  Skin: skin cancer  Heme: history of bleeding    Red Flags ROS:   Fever, Chills, Sweats: Denies  Involuntary Weight Loss: Denies  Bladder Incontinence: Denies  Bowel Incontinence: denies  Saddle Anesthesia: Denies    All other  "systems reviewed and negative.       PMHx:   Past Medical History:   Diagnosis Date   • Abnormal radiologic finding of lung field 9/8/2017    History of bilateral pulmonary nodules   • Anesthesia     patient's mom had side effects   • Arthritis 10/31/2018    \"everywhere\"   • Asthma    • Back pain    • Breath shortness    • Cancer (HCC) 2013    squamous cell   • Chickenpox    • COPD (chronic obstructive pulmonary disease) (HCC) 9/8/2017   • Dental disorder 10/31/2018    upper   • Emphysema of lung (HCC)    • Fatigue    • [place] measles    • Heart burn    • Hepatitis C     dx 10/2018   • Hypertension 10/31/2018   • Influenza    • Mumps    • Polio    • Seizure (HCC)     \"possibly 1 time in 2000\"   • Wears glasses        PSHx:   Past Surgical History:   Procedure Laterality Date   • FINGER ARTHROPLASTY Right 11/2/2018    Procedure: FINGER ARTHROPLASTY - THUMB CARPOMETACARPAL FUSION VERSUS THUMB METACARPOPHALANGEAL FUSION, THUMB CARPOMETACARPAL EXCISIONAL ARTHROPLASTY, FLEXOR CARPI RADIALIS GRAFT;  Surgeon: Jerzy Ritchie M.D.;  Location: SURGERY SAME DAY [facility];  Service: Orthopedics   • CHOLECYSTECTOMY     • GASTROSTOMY     • OTHER ORTHOPEDIC SURGERY      Left ankle fusion, right foot surgery   • PRIMARY C SECTION      hysterectomy 1995   • TONSILLECTOMY         Family history   Family History   Problem Relation Age of Onset   • Diabetes Mother    • Lung Cancer Father          Medications:   Outpatient Medications Marked as Taking for the 12/20/19 encounter (Office Visit) with Neftali Painter M.D.   Medication Sig Dispense Refill   • [START ON 12/28/2019] Buprenorphine 10 MCG/HR PATCH WEEKLY Apply 1 Patch to skin as directed every 7 days for 28 days. 4 Patch 0   • [START ON 1/25/2020] Buprenorphine 10 MCG/HR PATCH WEEKLY Apply 1 Patch to skin as directed every 7 days for 28 days. 4 Patch 0   • polyethylene glycol 3350 (MIRALAX) Powder Take 17 g by mouth 1 time daily as needed. 1 Bottle 2   • loratadine " (CLARITIN) 10 MG Tab Take 10 mg by mouth every day.     • ranitidine (ZANTAC) 300 MG tablet TAKE ONE TAB BY MOUTH EVERY DAY.  2   • mupirocin (BACTROBAN) 2 % Ointment   1   • amLODIPine (NORVASC) 2.5 MG Tab 7.5 mg 1 time daily as needed. Takes 5mg and 2.5mg for total of 7.5mg  3   • mirtazapine (REMERON) 15 MG Tab TAKE ONCE TABLET BY MOUTH AT BEDTIME.  1   • BREO ELLIPTA 100-25 MCG/INH AEROSOL POWDER, BREATH ACTIVATED INHALE 1 PUFF BY MOUTH EVERY DAY. RINSE MOUTH AFTER USE. 1 Each 5   • albuterol 108 (90 Base) MCG/ACT Aero Soln inhalation aerosol Inhale 2 Puffs by mouth every 6 hours as needed for Shortness of Breath.     • acetaminophen (TYLENOL) 325 MG Tab Take 650 mg by mouth every four hours as needed.     • tiotropium (SPIRIVA HANDIHALER) 18 MCG Cap Inhale 18 mcg by mouth every day.     • Fluticasone Furoate-Vilanterol (BREO ELLIPTA) 100-25 MCG/INH AEROSOL POWDER, BREATH ACTIVATED Inhale 1 Puff by mouth every day. Rinse mouth after use. 1 Each 5   • busPIRone (BUSPAR) 30 MG tablet TAKE ONE TABLET BY MOUTH TWICE PER DAY.  2   • amlodipine (NORVASC) 5 MG Tab Take 5 mg by mouth every day.           Allergies:   Allergies   Allergen Reactions   • Tape Unspecified     Rips off epidermis   • Ambien [Zolpidem] Unspecified     Memory loss   • Augmentin Nausea   • Bloodless      protocal per patient request; MD notified       Social Hx:   Social History     Socioeconomic History   • Marital status:      Spouse name: Not on file   • Number of children: Not on file   • Years of education: Not on file   • Highest education level: Not on file   Occupational History   • Not on file   Social Needs   • Financial resource strain: Not on file   • Food insecurity:     Worry: Not on file     Inability: Not on file   • Transportation needs:     Medical: Not on file     Non-medical: Not on file   Tobacco Use   • Smoking status: Current Every Day Smoker     Packs/day: 0.50     Years: 47.00     Pack years: 23.50     Types:  "Cigarettes   • Smokeless tobacco: Never Used   • Tobacco comment: \"working on cutting down as a family\"   Substance and Sexual Activity   • Alcohol use: No   • Drug use: No   • Sexual activity: Not on file   Lifestyle   • Physical activity:     Days per week: Not on file     Minutes per session: Not on file   • Stress: Not on file   Relationships   • Social connections:     Talks on phone: Not on file     Gets together: Not on file     Attends [employer] service: Not on file     Active member of club or organization: Not on file     Attends meetings of clubs or organizations: Not on file     Relationship status: Not on file   • Intimate partner violence:     Fear of current or ex partner: Not on file     Emotionally abused: Not on file     Physically abused: Not on file     Forced sexual activity: Not on file   Other Topics Concern   •  Service No   • Blood Transfusions No   • Caffeine Concern No   • Occupational Exposure No   • Hobby Hazards No   • Sleep Concern No   • Stress Concern Yes   • Weight Concern No   • Special Diet No   • Back Care No   • Exercise Yes   • Bike Helmet No   • Seat Belt Yes   • Self-Exams Yes   Social History Narrative   • Not on file         EXAMINATION     Physical Exam:   Vitals: /80 (BP Location: Left arm, Patient Position: Sitting, BP Cuff Size: Small adult)   Pulse 100   Temp 37.1 °C (98.7 °F) (Temporal)   Ht 1.473 m (4' 10\")   Wt 37.6 kg (82 lb 14.3 oz)   SpO2 93%     Constitutional:   Body Habitus: Body mass index is 17.32 kg/m².  Cooperation: Fully cooperates with exam  Appearance: Well-groomed, very thin, not disheveled, in no acute distress    Eyes: No scleral icterus, no proptosis     ENT -no obvious auditory deficits, no facial droop     Skin -no rashes or lesions noted     Respiratory-  breathing comfortable on room air, no audible wheezing    Cardiovascular- No lower extremity edema is noted.     Psychiatric- alert and oriented ×3. Normal affect.     Gait - " Abnormal gait, left KAFO and axillary crutches.       MEDICAL DECISION MAKING    Medical records review: see under HPI section.     DATA    Labs:   Lab Results   Component Value Date/Time    SODIUM 137 04/16/2019 01:00 PM    POTASSIUM 4.1 04/16/2019 01:00 PM    CHLORIDE 103 04/16/2019 01:00 PM    CO2 22 04/16/2019 01:00 PM    ANION 12.0 (H) 04/16/2019 01:00 PM    GLUCOSE 78 04/16/2019 01:00 PM    BUN 8 04/16/2019 01:00 PM    CREATININE 0.38 (L) 04/16/2019 01:00 PM    CALCIUM 9.3 04/16/2019 01:00 PM    ASTSGOT 20 04/16/2019 01:00 PM    ALTSGPT 11 04/16/2019 01:00 PM    TBILIRUBIN 0.3 04/16/2019 01:00 PM    ALBUMIN 4.2 04/16/2019 01:00 PM    TOTPROTEIN 7.8 04/16/2019 01:00 PM    GLOBULIN 3.6 (H) 04/16/2019 01:00 PM    AGRATIO 1.2 04/16/2019 01:00 PM          Lab Results   Component Value Date/Time    WBC 11.2 (H) 04/16/2019 01:00 PM    RBC 4.21 04/16/2019 01:00 PM    HEMOGLOBIN 12.0 04/16/2019 01:00 PM    HEMATOCRIT 38.7 04/16/2019 01:00 PM    MCV 91.9 04/16/2019 01:00 PM    MCH 28.5 04/16/2019 01:00 PM    MCHC 31.0 (L) 04/16/2019 01:00 PM    MPV 10.2 04/16/2019 01:00 PM    NEUTSPOLYS 68.30 04/16/2019 01:00 PM    LYMPHOCYTES 24.60 04/16/2019 01:00 PM    MONOCYTES 6.20 04/16/2019 01:00 PM    EOSINOPHILS 0.30 04/16/2019 01:00 PM    BASOPHILS 0.40 04/16/2019 01:00 PM       UDS 02/19/2019 Negative for substances, including buprenorphine.  This may test negative with use of butrans patch.  UDS 07/18/2019: Negative for buprenorphine.  Positive for kratom.  UDS 09/05/2019 Negative for buprenorphine.  Positive for low doses of kratom  UDS 12/20/2019 Negative for buprenorphine.  Positive for low dose of kratom and THC     Imaging:  MRI lumbar spine: Reviewed, this is my interpretation   12/17/2018  There is note of multilevel disc bulges without central canal stenosis.  There is lumbar facet hypertrophy noted in the lumbar facets bilaterally.  Foraminal canal stenosis is moderate right L2-3, mild L4-5 bilaterally.    MRI  lumbar spine report reviewed  12/17/2018 Multilevel degenerative changes of the lumbar spine as described                                                                                                                                                                    DEXA: 04/10/2019  According to the World Health Organization classification, bone mineral density of this patient is osteoporosis with high risk of fracture in the femoral region and normal in the lumbar region.    10-year Probability of Fracture:  Major Osteoporotic     21.1%  Hip     6.6%  Population      [place] ()    Based on left femur neck BMD      Diagnosis   Visit Diagnoses     ICD-10-CM   1. Post-polio syndrome G14   2. Chronic, continuous use of opioids F11.90   3. Other idiopathic scoliosis, thoracolumbar region M41.25   4. Tobacco use Z72.0   5. Vitamin D deficiency E55.9   6. Osteoporosis, unspecified osteoporosis type, unspecified pathological fracture presence M81.0   7. Abnormal gait R26.9   8. Low back pain with radiation M54.40       ASSESSMENT:  Jewell Skelton 69 y.o. female with history of polio, scoliosis, low back pain with radiation     Jewell was seen today for follow-up.    Diagnoses and all orders for this visit:    Post-polio syndrome  -     URINE DRUG SCREEN; Future  -     Buprenorphine 10 MCG/HR PATCH WEEKLY; Apply 1 Patch to skin as directed every 7 days for 28 days.  -     Consent for Opiate Prescription  -     Controlled Substance Treatment Agreement  -     Buprenorphine 10 MCG/HR PATCH WEEKLY; Apply 1 Patch to skin as directed every 7 days for 28 days.  -     Comp Metabolic Panel; Future    Chronic, continuous use of opioids  -     URINE DRUG SCREEN; Future  -     Comp Metabolic Panel; Future    Other idiopathic scoliosis, thoracolumbar region  -     Buprenorphine 10 MCG/HR PATCH WEEKLY; Apply 1 Patch to skin as directed every 7 days for 28 days.  -     Consent for Opiate Prescription  -     Controlled Substance  Treatment Agreement  -     Buprenorphine 10 MCG/HR PATCH WEEKLY; Apply 1 Patch to skin as directed every 7 days for 28 days.  -     Comp Metabolic Panel; Future    Tobacco use    Vitamin D deficiency  -     VITAMIN D,25 HYDROXY; Future    Osteoporosis, unspecified osteoporosis type, unspecified pathological fracture presence    Abnormal gait    Low back pain with radiation  -     Buprenorphine 10 MCG/HR PATCH WEEKLY; Apply 1 Patch to skin as directed every 7 days for 28 days.  -     Consent for Opiate Prescription  -     Controlled Substance Treatment Agreement  -     Buprenorphine 10 MCG/HR PATCH WEEKLY; Apply 1 Patch to skin as directed every 7 days for 28 days.         1. Plan to continue calcium and vitamin D in divided doses twice a day for osteoporosis and vitamin D deficiency.  She continues this daily.    2. Continue butrans 10 mcg/hr.  She reports no side effects.  Rotate patches.  Continue bowel program as needed.  Discussed that she reports that she has discontinued kratom.  Low doses on last UDS, see below.  Plan to repeat.  Discussed that she does not use THC, but lives in a small home and others use medical marijuana.  Advised about this.  She reports that she is not knowingly taking kratom.  Plan to recheck UDS    3. She is doing well with her new KAFO.  Continue with use.    4. Discussed that she is contemplating smoking cessation again.  She has patches and nicotine gum.  I advise quitting smoking with multiple health benefits for you have those around you. Please call [phone] or  visit our website at [email or web address] to see if you are a candidate for the QUIT tobacco program at [facility].  Encouraged her to continue with smoking cessation. Discussed for 3 minutes      Follow-up: Follow-up in 8 weeks    Please note that this dictation was created using voice recognition software. I have made every reasonable attempt to correct obvious errors but there may be errors of grammar and content  that I may have overlooked prior to finalization of this note.      Neftali Painter MD  Physical Medicine and Rehabilitation  Interventional Spine and Sports Physiatry  [facility]

## 2020-02-20 ENCOUNTER — OFFICE VISIT (OUTPATIENT)
Dept: PHYSICAL MEDICINE AND REHAB | Facility: MEDICAL CENTER | Age: 70
End: 2020-02-20
Payer: MEDICARE

## 2020-02-20 VITALS
TEMPERATURE: 98.7 F | HEART RATE: 100 BPM | SYSTOLIC BLOOD PRESSURE: 138 MMHG | BODY MASS INDEX: 17.59 KG/M2 | WEIGHT: 83.78 LBS | OXYGEN SATURATION: 94 % | DIASTOLIC BLOOD PRESSURE: 72 MMHG | HEIGHT: 58 IN

## 2020-02-20 DIAGNOSIS — M41.25 OTHER IDIOPATHIC SCOLIOSIS, THORACOLUMBAR REGION: ICD-10-CM

## 2020-02-20 DIAGNOSIS — G14 POST-POLIO SYNDROME: ICD-10-CM

## 2020-02-20 DIAGNOSIS — E55.9 VITAMIN D DEFICIENCY: ICD-10-CM

## 2020-02-20 DIAGNOSIS — M54.50 LOW BACK PAIN WITH RADIATION: ICD-10-CM

## 2020-02-20 DIAGNOSIS — F11.90 CHRONIC, CONTINUOUS USE OF OPIOIDS: ICD-10-CM

## 2020-02-20 DIAGNOSIS — Z72.0 TOBACCO USE: ICD-10-CM

## 2020-02-20 PROCEDURE — 99214 OFFICE O/P EST MOD 30 MIN: CPT | Mod: 25 | Performed by: PHYSICAL MEDICINE & REHABILITATION

## 2020-02-20 PROCEDURE — 99406 BEHAV CHNG SMOKING 3-10 MIN: CPT | Performed by: PHYSICAL MEDICINE & REHABILITATION

## 2020-02-20 RX ORDER — FAMOTIDINE 20 MG/1
TABLET, FILM COATED ORAL
COMMUNITY
Start: 2020-01-09

## 2020-02-20 RX ORDER — BUPRENORPHINE 10 UG/H
1 PATCH TRANSDERMAL
Qty: 4 PATCH | Refills: 0 | Status: SHIPPED | OUTPATIENT
Start: 2020-02-22 | End: 2020-03-21

## 2020-02-20 RX ORDER — BUPRENORPHINE 10 UG/H
1 PATCH TRANSDERMAL
Qty: 4 PATCH | Refills: 0 | Status: SHIPPED | OUTPATIENT
Start: 2020-03-21 | End: 2020-04-15 | Stop reason: SDUPTHER

## 2020-02-20 RX ORDER — VALACYCLOVIR HYDROCHLORIDE 1 G/1
TABLET, FILM COATED ORAL
COMMUNITY
Start: 2019-12-05

## 2020-02-20 ASSESSMENT — PATIENT HEALTH QUESTIONNAIRE - PHQ9: CLINICAL INTERPRETATION OF PHQ2 SCORE: 0

## 2020-02-20 ASSESSMENT — PAIN SCALES - GENERAL: PAINLEVEL: 8=MODERATE-SEVERE PAIN

## 2020-02-20 NOTE — PROGRESS NOTES
Follow-up patient note    Physiatry (physical medicine and  Rehabilitation), interventional spine and sports medicine    Date of Service:02/20/2020    Chief complaint: Chronic pain    HISTORY    HPI: Jewell Skelton 69 y.o. female who presents today, accompanied by her daughter, for follow-up of pain related to her scoliosis, low back and history of post-polio.      Since the last visit, she has been having continued low back pain.  This is primarily aching pain.  There is still some pain in the left shin and right posterior gluteal and back.      She has been using her new KAFO.  There was a small skin lesion that developed, but this healed and she feels like this is better.  She did not have this adjusted.    Butrans patch 10mcg/hr, rotating sites, no skin changes.  Bowel movements are regular, daily or every other day.  No need for miralax recently, but she has this at home.    She and her daughter are here today.  Her daughter contributes to the HPI.  We discussed that there is some marijuana smoking that does occur in the trailer she lives in, but she denies smoking it.   She reports that she is not taking kratom, although she had been storing pills in a container than had kratom in it.    She is still smoking about the same amount, but they are still working on trying to smoke outside.  She has trouble stepping up into the trailer as the step is too steep.    ORT: 15   reviewed.      PHQ9 -4    Medical records review:  Previous treatments:    Physical Therapy: Yes, in the past    Medications the patient is tried: Narcotics, tylenol, muscle relaxants      Previous interventions: yes    Previous surgeries to relieve the above pain:  none      ROS: Unchanged from previous visit 12/20/2019 except as noted  Eyes: Wears glasses  ENT: history of post-nasal drip  Resp:COPD  GI:h/o ulcers, acid reflux, Hep C  Skin: skin cancer  Heme: history of bleeding    Red Flags ROS:   Fever, Chills, Sweats: Denies  Involuntary  "Weight Loss: Denies  Bladder Incontinence: Denies  Bowel Incontinence: denies  Saddle Anesthesia: Denies    All other systems reviewed and negative.       PMHx:   Past Medical History:   Diagnosis Date   • Abnormal radiologic finding of lung field 9/8/2017    History of bilateral pulmonary nodules   • Anesthesia     patient's mom had side effects   • Arthritis 10/31/2018    \"everywhere\"   • Asthma    • Back pain    • Breath shortness    • Cancer (HCC) 2013    squamous cell   • Chickenpox    • COPD (chronic obstructive pulmonary disease) (HCC) 9/8/2017   • Dental disorder 10/31/2018    upper   • Emphysema of lung (HCC)    • Fatigue    • Portuguese measles    • Heart burn    • Hepatitis C     dx 10/2018   • Hypertension 10/31/2018   • Influenza    • Mumps    • Polio    • Seizure (HCC)     \"possibly 1 time in 2000\"   • Wears glasses        PSHx:   Past Surgical History:   Procedure Laterality Date   • FINGER ARTHROPLASTY Right 11/2/2018    Procedure: FINGER ARTHROPLASTY - THUMB CARPOMETACARPAL FUSION VERSUS THUMB METACARPOPHALANGEAL FUSION, THUMB CARPOMETACARPAL EXCISIONAL ARTHROPLASTY, FLEXOR CARPI RADIALIS GRAFT;  Surgeon: Jerzy Ritchie M.D.;  Location: SURGERY SAME DAY [facility];  Service: Orthopedics   • CHOLECYSTECTOMY     • GASTROSTOMY     • OTHER ORTHOPEDIC SURGERY      Left ankle fusion, right foot surgery   • PRIMARY C SECTION      hysterectomy 1995   • TONSILLECTOMY         Family history   Family History   Problem Relation Age of Onset   • Diabetes Mother    • Lung Cancer Father          Medications:   Outpatient Medications Marked as Taking for the 2/20/20 encounter (Office Visit) with Neftali Painter M.D.   Medication Sig Dispense Refill   • Buprenorphine 10 MCG/HR PATCH WEEKLY Apply 1 Patch to skin as directed every 7 days for 28 days. 4 Patch 0   • [START ON 3/21/2020] Buprenorphine 10 MCG/HR PATCH WEEKLY Apply 1 Patch to skin as directed every 7 days for 28 days. 4 Patch 0   • polyethylene glycol " 3350 (MIRALAX) Powder Take 17 g by mouth 1 time daily as needed. 1 Bottle 2   • loratadine (CLARITIN) 10 MG Tab Take 10 mg by mouth every day.     • mupirocin (BACTROBAN) 2 % Ointment   1   • amLODIPine (NORVASC) 2.5 MG Tab 7.5 mg 1 time daily as needed. Takes 5mg and 2.5mg for total of 7.5mg  3   • mirtazapine (REMERON) 15 MG Tab TAKE ONCE TABLET BY MOUTH AT BEDTIME.  1   • BREO ELLIPTA 100-25 MCG/INH AEROSOL POWDER, BREATH ACTIVATED INHALE 1 PUFF BY MOUTH EVERY DAY. RINSE MOUTH AFTER USE. 1 Each 5   • albuterol 108 (90 Base) MCG/ACT Aero Soln inhalation aerosol Inhale 2 Puffs by mouth every 6 hours as needed for Shortness of Breath.     • acetaminophen (TYLENOL) 325 MG Tab Take 650 mg by mouth every four hours as needed.     • tiotropium (SPIRIVA HANDIHALER) 18 MCG Cap Inhale 18 mcg by mouth every day.     • Fluticasone Furoate-Vilanterol (BREO ELLIPTA) 100-25 MCG/INH AEROSOL POWDER, BREATH ACTIVATED Inhale 1 Puff by mouth every day. Rinse mouth after use. 1 Each 5   • busPIRone (BUSPAR) 30 MG tablet TAKE ONE TABLET BY MOUTH TWICE PER DAY.  2   • Calcium Carb-Cholecalciferol (CALCIUM 600 + D PO) Take 1 tablet by mouth every day.     • amlodipine (NORVASC) 5 MG Tab Take 5 mg by mouth every day.           Allergies:   Allergies   Allergen Reactions   • Tape Unspecified     Rips off epidermis   • Ambien [Zolpidem] Unspecified     Memory loss   • Augmentin Nausea   • Bloodless      protocal per patient request; MD notified       Social Hx:   Social History     Socioeconomic History   • Marital status:      Spouse name: Not on file   • Number of children: Not on file   • Years of education: Not on file   • Highest education level: Not on file   Occupational History   • Not on file   Social Needs   • Financial resource strain: Not on file   • Food insecurity     Worry: Not on file     Inability: Not on file   • Transportation needs     Medical: Not on file     Non-medical: Not on file   Tobacco Use   • Smoking  "status: Current Every Day Smoker     Packs/day: 0.50     Years: 47.00     Pack years: 23.50     Types: Cigarettes   • Smokeless tobacco: Never Used   • Tobacco comment: \"working on cutting down as a family\"   Substance and Sexual Activity   • Alcohol use: No   • Drug use: No   • Sexual activity: Not on file   Lifestyle   • Physical activity     Days per week: Not on file     Minutes per session: Not on file   • Stress: Not on file   Relationships   • Social connections     Talks on phone: Not on file     Gets together: Not on file     Attends [employer] service: Not on file     Active member of club or organization: Not on file     Attends meetings of clubs or organizations: Not on file     Relationship status: Not on file   • Intimate partner violence     Fear of current or ex partner: Not on file     Emotionally abused: Not on file     Physically abused: Not on file     Forced sexual activity: Not on file   Other Topics Concern   •  Service No   • Blood Transfusions No   • Caffeine Concern No   • Occupational Exposure No   • Hobby Hazards No   • Sleep Concern No   • Stress Concern Yes   • Weight Concern No   • Special Diet No   • Back Care No   • Exercise Yes   • Bike Helmet No   • Seat Belt Yes   • Self-Exams Yes   Social History Narrative   • Not on file         EXAMINATION     Physical Exam:   Vitals: /72 (BP Location: Left arm, Patient Position: Sitting, BP Cuff Size: Small adult)   Pulse 100   Temp 37.1 °C (98.7 °F) (Temporal)   Ht 1.473 m (4' 10\")   Wt 38 kg (83 lb 12.4 oz)   SpO2 94%     Constitutional:   Body Habitus: Body mass index is 17.51 kg/m².  Cooperation: Fully cooperates with exam  Appearance: Well-groomed, very thin, not disheveled, in no acute distress    Eyes: No scleral icterus, no proptosis     ENT -no obvious auditory deficits, no facial droop     Skin -no rashes or lesions noted, no evidence of skin breakdown left posterior knee region.    Respiratory-  breathing " comfortable on room air, no audible wheezing    Cardiovascular- No lower extremity edema is noted.     Psychiatric- alert and oriented ×3. Normal affect.     Gait - Abnormal gait, left KAFO and axillary crutches.      Musculoskeletal- Left lower extremity: Knee extension is 2/5, knee flexion 3/5, dorsiflexion and plantarflexion are 2/5       MEDICAL DECISION MAKING    Medical records review: see under HPI section.     DATA  UDS: 10/29/2019: Buprenorphine is negative, which can be the case with the low doses for butrans patch.  Positive for trace amounts of marijuana and kratom    Labs:   Lab Results   Component Value Date/Time    SODIUM 137 04/16/2019 01:00 PM    POTASSIUM 4.1 04/16/2019 01:00 PM    CHLORIDE 103 04/16/2019 01:00 PM    CO2 22 04/16/2019 01:00 PM    ANION 12.0 (H) 04/16/2019 01:00 PM    GLUCOSE 78 04/16/2019 01:00 PM    BUN 8 04/16/2019 01:00 PM    CREATININE 0.38 (L) 04/16/2019 01:00 PM    CALCIUM 9.3 04/16/2019 01:00 PM    ASTSGOT 20 04/16/2019 01:00 PM    ALTSGPT 11 04/16/2019 01:00 PM    TBILIRUBIN 0.3 04/16/2019 01:00 PM    ALBUMIN 4.2 04/16/2019 01:00 PM    TOTPROTEIN 7.8 04/16/2019 01:00 PM    GLOBULIN 3.6 (H) 04/16/2019 01:00 PM    AGRATIO 1.2 04/16/2019 01:00 PM          Lab Results   Component Value Date/Time    WBC 11.2 (H) 04/16/2019 01:00 PM    RBC 4.21 04/16/2019 01:00 PM    HEMOGLOBIN 12.0 04/16/2019 01:00 PM    HEMATOCRIT 38.7 04/16/2019 01:00 PM    MCV 91.9 04/16/2019 01:00 PM    MCH 28.5 04/16/2019 01:00 PM    MCHC 31.0 (L) 04/16/2019 01:00 PM    MPV 10.2 04/16/2019 01:00 PM    NEUTSPOLYS 68.30 04/16/2019 01:00 PM    LYMPHOCYTES 24.60 04/16/2019 01:00 PM    MONOCYTES 6.20 04/16/2019 01:00 PM    EOSINOPHILS 0.30 04/16/2019 01:00 PM    BASOPHILS 0.40 04/16/2019 01:00 PM       UDS 02/19/2019 Negative for substances, including buprenorphine.  This may test negative with use of butrans patch.  UDS 07/18/2019: Negative for buprenorphine.  Positive for kratom.  UDS 09/05/2019 Negative for  buprenorphine.  Positive for low doses of kratom  UDS 12/20/2019 Negative for buprenorphine.  Positive for low dose of kratom and THC     Imaging:  MRI lumbar spine: Reviewed, this is my interpretation   12/17/2018  There is note of multilevel disc bulges without central canal stenosis.  There is lumbar facet hypertrophy noted in the lumbar facets bilaterally.  Foraminal canal stenosis is moderate right L2-3, mild L4-5 bilaterally.    MRI lumbar spine report reviewed  12/17/2018 Multilevel degenerative changes of the lumbar spine as described                                                                                                                                                                    DEXA: 04/10/2019  According to the World Health Organization classification, bone mineral density of this patient is osteoporosis with high risk of fracture in the femoral region and normal in the lumbar region.    10-year Probability of Fracture:  Major Osteoporotic     21.1%  Hip     6.6%  Population      [place] ()    Based on left femur neck BMD      Diagnosis   Visit Diagnoses     ICD-10-CM   1. Post-polio syndrome G14   2. Other idiopathic scoliosis, thoracolumbar region M41.25   3. Low back pain with radiation M54.40   4. Tobacco use Z72.0   5. Chronic, continuous use of opioids F11.90   6. Vitamin D deficiency E55.9       ASSESSMENT:  Jewell Skelton 69 y.o. female with history of polio, scoliosis, low back pain with radiation     Jewell was seen today for follow-up.    Diagnoses and all orders for this visit:    Post-polio syndrome  -     Buprenorphine 10 MCG/HR PATCH WEEKLY; Apply 1 Patch to skin as directed every 7 days for 28 days.  -     Buprenorphine 10 MCG/HR PATCH WEEKLY; Apply 1 Patch to skin as directed every 7 days for 28 days.  -     Consent for Opiate Prescription  -     Controlled Substance Treatment Agreement  -     Pain Management Screen; Future    Other idiopathic scoliosis, thoracolumbar  region  -     Buprenorphine 10 MCG/HR PATCH WEEKLY; Apply 1 Patch to skin as directed every 7 days for 28 days.  -     Buprenorphine 10 MCG/HR PATCH WEEKLY; Apply 1 Patch to skin as directed every 7 days for 28 days.  -     Consent for Opiate Prescription  -     Controlled Substance Treatment Agreement  -     Pain Management Screen; Future    Low back pain with radiation  -     Buprenorphine 10 MCG/HR PATCH WEEKLY; Apply 1 Patch to skin as directed every 7 days for 28 days.  -     Buprenorphine 10 MCG/HR PATCH WEEKLY; Apply 1 Patch to skin as directed every 7 days for 28 days.  -     Consent for Opiate Prescription  -     Controlled Substance Treatment Agreement  -     Pain Management Screen; Future    Tobacco use    Chronic, continuous use of opioids    Vitamin D deficiency         1. Plan to continue calcium and vitamin D in divided doses twice a day for osteoporosis and vitamin D deficiency.  She continues to take this regularly.  Recommend that she get the labs done from 12/20/2019  2. Discussed results of the UDS and repeat UDS today.  The trace amounts point against regular use of THC or kratom.  Plan to recheck. Advised that other smoke marijuana outside of the house and not to contaminate her pills with items that could have kratom.  3. Continue butrans 10mcg/hr.  Continue to rotate patches.  Bowel movements are regular without use of regular OTC medications.  No side effects reported.  4. Continue to monitor skin for irritation related to the KAFO.  5. Discussed that she is contemplating smoking cessation again.  She has patches and nicotine gum.  I advise quitting smoking with multiple health benefits for you have those around you. Please call [phone] or  visit our website at [email or web address] to see if you are a candidate for the QUIT tobacco program at [facility].  Encouraged her to continue with smoking cessation. Discussed for 5 minutes with patient an her daughter.       Follow-up: Follow-up in  8 weeks    Please note that this dictation was created using voice recognition software. I have made every reasonable attempt to correct obvious errors but there may be errors of grammar and content that I may have overlooked prior to finalization of this note.      Neftali Painter MD  Physical Medicine and Rehabilitation  Interventional Spine and Sports Physiatry  [facility]

## 2020-04-15 ENCOUNTER — TELEMEDICINE (OUTPATIENT)
Dept: PHYSICAL MEDICINE AND REHAB | Facility: MEDICAL CENTER | Age: 70
End: 2020-04-15
Payer: MEDICARE

## 2020-04-15 VITALS — HEIGHT: 59 IN | BODY MASS INDEX: 16.92 KG/M2

## 2020-04-15 DIAGNOSIS — M41.25 OTHER IDIOPATHIC SCOLIOSIS, THORACOLUMBAR REGION: ICD-10-CM

## 2020-04-15 DIAGNOSIS — M54.50 LOW BACK PAIN WITH RADIATION: ICD-10-CM

## 2020-04-15 DIAGNOSIS — F11.90 CHRONIC, CONTINUOUS USE OF OPIOIDS: ICD-10-CM

## 2020-04-15 DIAGNOSIS — G14 POST-POLIO SYNDROME: ICD-10-CM

## 2020-04-15 DIAGNOSIS — E55.9 VITAMIN D DEFICIENCY: ICD-10-CM

## 2020-04-15 DIAGNOSIS — M81.0 OSTEOPOROSIS, UNSPECIFIED OSTEOPOROSIS TYPE, UNSPECIFIED PATHOLOGICAL FRACTURE PRESENCE: ICD-10-CM

## 2020-04-15 PROCEDURE — 99214 OFFICE O/P EST MOD 30 MIN: CPT | Mod: 95,CR | Performed by: PHYSICAL MEDICINE & REHABILITATION

## 2020-04-15 RX ORDER — FLUTICASONE PROPIONATE 50 MCG
SPRAY, SUSPENSION (ML) NASAL
COMMUNITY
Start: 2020-03-10

## 2020-04-15 ASSESSMENT — PATIENT HEALTH QUESTIONNAIRE - PHQ9: CLINICAL INTERPRETATION OF PHQ2 SCORE: 0

## 2020-04-15 ASSESSMENT — PAIN SCALES - GENERAL: PAINLEVEL: 8=MODERATE-SEVERE PAIN

## 2020-04-15 NOTE — PROGRESS NOTES
Telemedicine Visit: Established Patient     This encounter was conducted via Zoom .   Verbal consent was obtained. Patient's identity was verified.    Subjective:   CC: Low back and right hip pain  Jewell Skelton is a 69 y.o. female presenting for evaluation and management of chronic pain.    Overall, her pain has been stable, since last visit on 02/20/2020.  Her back and right hip have been painful.  Unchanged from last visit, though, and helped to be manageable with  Buprenorphine 10mcg/hr patch.  No side effects or skin issues with patch use.  The left shin is better since the last visit.  She reports that this is healed now and is not painful.    Bowel movements are regular.  She is not taking miralax.  Her patch has been helpful, rotating the butrans patch.    Her daughter is present during the telehealth visit and contributes to the HPI    ROS Unchanged from 02/20/2020  Denies any recent fevers or chills. No nausea or vomiting. No chest pains or shortness of breath.     Allergies   Allergen Reactions   • Tape Unspecified     Rips off epidermis   • Ambien [Zolpidem] Unspecified     Memory loss   • Augmentin Nausea   • Bloodless      protocal per patient request; MD notified       Current medicines (including changes today)  Current Outpatient Medications   Medication Sig Dispense Refill   • famotidine (PEPCID) 20 MG Tab      • valacyclovir (VALTREX) 1 GM Tab      • Buprenorphine 10 MCG/HR PATCH WEEKLY Apply 1 Patch to skin as directed every 7 days for 28 days. 4 Patch 0   • polyethylene glycol 3350 (MIRALAX) Powder Take 17 g by mouth 1 time daily as needed. 1 Bottle 2   • loratadine (CLARITIN) 10 MG Tab Take 10 mg by mouth every day.     • mupirocin (BACTROBAN) 2 % Ointment   1   • amLODIPine (NORVASC) 2.5 MG Tab 7.5 mg 1 time daily as needed. Takes 5mg and 2.5mg for total of 7.5mg  3   • mirtazapine (REMERON) 15 MG Tab TAKE ONCE TABLET BY MOUTH AT BEDTIME.  1   • BREO ELLIPTA 100-25 MCG/INH AEROSOL POWDER,  BREATH ACTIVATED INHALE 1 PUFF BY MOUTH EVERY DAY. RINSE MOUTH AFTER USE. 1 Each 5   • albuterol 108 (90 Base) MCG/ACT Aero Soln inhalation aerosol Inhale 2 Puffs by mouth every 6 hours as needed for Shortness of Breath.     • acetaminophen (TYLENOL) 325 MG Tab Take 650 mg by mouth every four hours as needed.     • tiotropium (SPIRIVA HANDIHALER) 18 MCG Cap Inhale 18 mcg by mouth every day.     • Fluticasone Furoate-Vilanterol (BREO ELLIPTA) 100-25 MCG/INH AEROSOL POWDER, BREATH ACTIVATED Inhale 1 Puff by mouth every day. Rinse mouth after use. 1 Each 5   • busPIRone (BUSPAR) 30 MG tablet TAKE ONE TABLET BY MOUTH TWICE PER DAY.  2   • Calcium Carb-Cholecalciferol (CALCIUM 600 + D PO) Take 1 tablet by mouth every day.     • amlodipine (NORVASC) 5 MG Tab Take 5 mg by mouth every day.     • fluticasone (FLONASE) 50 MCG/ACT nasal spray ONE TO TWO SPRAYS IN EACH NOSTRIL DAILY AS NEEDED FOR CONGESTION       No current facility-administered medications for this visit.        Patient Active Problem List    Diagnosis Date Noted   • Tobacco abuse counseling 05/16/2018   • Pulmonary nodules 05/16/2018   • Tobacco use 04/16/2018   • COPD (chronic obstructive pulmonary disease) ([facility]) 09/08/2017   • Abnormal radiologic finding of lung field 09/08/2017       Family History   Problem Relation Age of Onset   • Diabetes Mother    • Lung Cancer Father        She  has a past medical history of Abnormal radiologic finding of lung field (9/8/2017), Anesthesia, Arthritis (10/31/2018), Asthma, Back pain, Breath shortness, Cancer ([facility]) (2013), Chickenpox, COPD (chronic obstructive pulmonary disease) ([facility]) (9/8/2017), Dental disorder (10/31/2018), Emphysema of lung ([facility]), Fatigue, [place] measles, Heart burn, Hepatitis C, Hypertension (10/31/2018), Influenza, Mumps, Polio, Seizure ([facility]), and Wears glasses.  She  has a past surgical history that includes cholecystectomy; gastrostomy; tonsillectomy; other orthopedic surgery; primary c section;  and finger arthroplasty (Right, 11/2/2018).       Objective:   Vitals not obtained second to telehealth visit    Physical Exam:  Constitutional: Alert, no distress, well-groomed.  Skin: No rashes in visible areas.  Eye: Round. Conjunctiva clear, lids normal. No icterus.   ENMT: Lips pink without lesions, good dentition, moist mucous membranes. Phonation normal.  Neck: No masses, no thyromegaly. Moves freely without pain.  Respiratory: Unlabored respiratory effort, no cough or audible wheeze  Psych: Alert and oriented x3, normal affect and mood.       Assessment and Plan:   The following treatment plan was discussed:     1. Post-polio syndrome  - Buprenorphine 10 MCG/HR PATCH WEEKLY; Apply 1 Patch to skin as directed every 7 days for 28 days.  Dispense: 4 Patch; Refill: 0  - Buprenorphine 10 MCG/HR PATCH WEEKLY; Apply 1 Patch to skin as directed every 7 days for 28 days.  Dispense: 4 Patch; Refill: 0  - Buprenorphine 10 MCG/HR PATCH WEEKLY; Apply 1 Patch to skin as directed every 7 days for 28 days.  Dispense: 4 Patch; Refill: 0    2. Other idiopathic scoliosis, thoracolumbar region  - Buprenorphine 10 MCG/HR PATCH WEEKLY; Apply 1 Patch to skin as directed every 7 days for 28 days.  Dispense: 4 Patch; Refill: 0  - Buprenorphine 10 MCG/HR PATCH WEEKLY; Apply 1 Patch to skin as directed every 7 days for 28 days.  Dispense: 4 Patch; Refill: 0  - Buprenorphine 10 MCG/HR PATCH WEEKLY; Apply 1 Patch to skin as directed every 7 days for 28 days.  Dispense: 4 Patch; Refill: 0    3. Low back pain with radiation  - Buprenorphine 10 MCG/HR PATCH WEEKLY; Apply 1 Patch to skin as directed every 7 days for 28 days.  Dispense: 4 Patch; Refill: 0  - Buprenorphine 10 MCG/HR PATCH WEEKLY; Apply 1 Patch to skin as directed every 7 days for 28 days.  Dispense: 4 Patch; Refill: 0  - Buprenorphine 10 MCG/HR PATCH WEEKLY; Apply 1 Patch to skin as directed every 7 days for 28 days.  Dispense: 4 Patch; Refill: 0    4. Osteoporosis,  unspecified osteoporosis type, unspecified pathological fracture presence    5. Vitamin D deficiency    6. Chronic, continuous use of opioids    Other orders  - fluticasone (FLONASE) 50 MCG/ACT nasal spray; ONE TO TWO SPRAYS IN EACH NOSTRIL DAILY AS NEEDED FOR CONGESTION      1. Her grand-daughter, Nicol Squires, will be picking up her medication on Friday.  2. Discussed that she will continue buprenorphine 10mcg/hour patch.  No side effects.  Scripts given for April, May and June 2020.  3. Encouraged her to continue vitamin D supplementation and we will have her check these labs when she feels that it is safe, given concerns with COVID-19.  She will continue calcium and vitamin D in divided doses for osteoporosis and vitamin D deficiency.  4. Flonase was not prescribed, this is an outside medication that was noted in medication reconciliation.    Follow-up: Return in about 3 months (around 7/15/2020).

## 2020-04-17 ENCOUNTER — TELEPHONE (OUTPATIENT)
Dept: PHYSICAL MEDICINE AND REHAB | Facility: MEDICAL CENTER | Age: 70
End: 2020-04-17

## 2020-04-17 RX ORDER — BUPRENORPHINE 10 UG/H
1 PATCH TRANSDERMAL
Qty: 4 PATCH | Refills: 0 | Status: SHIPPED | OUTPATIENT
Start: 2020-05-16 | End: 2020-06-13

## 2020-04-17 RX ORDER — BUPRENORPHINE 10 UG/H
1 PATCH TRANSDERMAL
Qty: 4 PATCH | Refills: 0 | Status: SHIPPED | OUTPATIENT
Start: 2020-06-13 | End: 2020-07-11

## 2020-04-17 RX ORDER — BUPRENORPHINE 10 UG/H
1 PATCH TRANSDERMAL
Qty: 4 PATCH | Refills: 0 | Status: SHIPPED | OUTPATIENT
Start: 2020-04-18 | End: 2020-05-16

## 2020-07-17 ENCOUNTER — OFFICE VISIT (OUTPATIENT)
Dept: PHYSICAL MEDICINE AND REHAB | Facility: MEDICAL CENTER | Age: 70
End: 2020-07-17
Payer: MEDICARE

## 2020-07-17 VITALS
OXYGEN SATURATION: 96 % | BODY MASS INDEX: 16.89 KG/M2 | HEIGHT: 58 IN | HEART RATE: 100 BPM | WEIGHT: 80.47 LBS | TEMPERATURE: 98.4 F | SYSTOLIC BLOOD PRESSURE: 132 MMHG | DIASTOLIC BLOOD PRESSURE: 60 MMHG

## 2020-07-17 DIAGNOSIS — M54.50 LOW BACK PAIN, UNSPECIFIED BACK PAIN LATERALITY, UNSPECIFIED CHRONICITY, UNSPECIFIED WHETHER SCIATICA PRESENT: ICD-10-CM

## 2020-07-17 DIAGNOSIS — M79.89 LEFT LEG SWELLING: ICD-10-CM

## 2020-07-17 DIAGNOSIS — M79.605 PAIN OF LEFT LOWER EXTREMITY: ICD-10-CM

## 2020-07-17 DIAGNOSIS — M41.25 OTHER IDIOPATHIC SCOLIOSIS, THORACOLUMBAR REGION: ICD-10-CM

## 2020-07-17 DIAGNOSIS — M47.816 LUMBAR SPONDYLOSIS: ICD-10-CM

## 2020-07-17 DIAGNOSIS — G14 POST-POLIO SYNDROME: ICD-10-CM

## 2020-07-17 DIAGNOSIS — Z71.6 TOBACCO ABUSE COUNSELING: ICD-10-CM

## 2020-07-17 PROCEDURE — 99406 BEHAV CHNG SMOKING 3-10 MIN: CPT | Performed by: PHYSICAL MEDICINE & REHABILITATION

## 2020-07-17 PROCEDURE — 99214 OFFICE O/P EST MOD 30 MIN: CPT | Mod: 25 | Performed by: PHYSICAL MEDICINE & REHABILITATION

## 2020-07-17 RX ORDER — BUPRENORPHINE 10 UG/H
10 PATCH TRANSDERMAL DAILY
COMMUNITY
End: 2020-07-17 | Stop reason: SDUPTHER

## 2020-07-17 RX ORDER — BUPRENORPHINE 10 UG/H
1 PATCH TRANSDERMAL
Qty: 4 PATCH | Refills: 0 | Status: SHIPPED | OUTPATIENT
Start: 2020-07-17 | End: 2020-08-14

## 2020-07-17 RX ORDER — BUPRENORPHINE 10 UG/H
1 PATCH TRANSDERMAL
Qty: 4 PATCH | Refills: 0 | Status: SHIPPED | OUTPATIENT
Start: 2020-09-11 | End: 2020-10-02 | Stop reason: SDUPTHER

## 2020-07-17 RX ORDER — BUPRENORPHINE 10 UG/H
1 PATCH TRANSDERMAL
Qty: 4 PATCH | Refills: 0 | Status: SHIPPED | OUTPATIENT
Start: 2020-08-14 | End: 2020-09-11

## 2020-07-17 ASSESSMENT — PAIN SCALES - GENERAL: PAINLEVEL: 6=MODERATE PAIN

## 2020-07-17 ASSESSMENT — PATIENT HEALTH QUESTIONNAIRE - PHQ9
CLINICAL INTERPRETATION OF PHQ2 SCORE: 2
SUM OF ALL RESPONSES TO PHQ QUESTIONS 1-9: 7
5. POOR APPETITE OR OVEREATING: 1 - SEVERAL DAYS

## 2020-07-17 ASSESSMENT — FIBROSIS 4 INDEX: FIB4 SCORE: 1.25

## 2020-07-17 NOTE — PROGRESS NOTES
"Follow-up patient note    Physiatry (physical medicine and  Rehabilitation), interventional spine and sports medicine    Date of Service:07/17/2020    Chief complaint: Chronic pain    HISTORY    HPI: Jewell Skelton 69 y.o. female who presents today, accompanied by her daughter, for follow-up of pain related to her scoliosis, low back and history of post-polio.      Since the last visit, she reports that her pain has been manageable with the use of the butrans 10mcg patch.  No side effects.   Bowel movements are regular.  No need for OTC medications.    She continues to have aching pain in the low back.  No radicular symptoms, but she has had some difficulty with swelling and pain in the left leg.  No skin issues from the KAFO reported.    Since the last visit, she has not reduced her smoking.  She is smoking about 15 cigarettes a day.  She is still thinking about quitting, reports that she has less enjoyment than she has in the past.  Cigarettes \"taste bad.\"    ORT: 15   reviewed today.    PHQ9 -7    Medical records review:  Previous treatments:    Physical Therapy: Yes, in the past    Medications the patient is tried: Narcotics, tylenol, muscle relaxants      Previous interventions: yes    Previous surgeries to relieve the above pain:  none      ROS: Unchanged from previous visit 04/15/2020 except as noted  Eyes: Wears glasses  ENT: history of post-nasal drip  Resp:COPD  GI:h/o ulcers, acid reflux, Hep C  Skin: skin cancer  Heme: history of bleeding    Red Flags ROS:   Fever, Chills, Sweats: Denies  Involuntary Weight Loss: Denies  Bladder Incontinence: Denies  Bowel Incontinence: denies  Saddle Anesthesia: Denies    All other systems reviewed and negative.       PMHx:   Past Medical History:   Diagnosis Date   • Abnormal radiologic finding of lung field 9/8/2017    History of bilateral pulmonary nodules   • Anesthesia     patient's mom had side effects   • Arthritis 10/31/2018    \"everywhere\"   • Asthma    • Back " "pain    • Breath shortness    • Cancer (HCC) 2013    squamous cell   • Chickenpox    • COPD (chronic obstructive pulmonary disease) (HCC) 9/8/2017   • Dental disorder 10/31/2018    upper   • Emphysema of lung (HCC)    • Fatigue    • Welsh measles    • Heart burn    • Hepatitis C     dx 10/2018   • Hypertension 10/31/2018   • Influenza    • Mumps    • Polio    • Seizure (HCC)     \"possibly 1 time in 2000\"   • Wears glasses        PSHx:   Past Surgical History:   Procedure Laterality Date   • FINGER ARTHROPLASTY Right 11/2/2018    Procedure: FINGER ARTHROPLASTY - THUMB CARPOMETACARPAL FUSION VERSUS THUMB METACARPOPHALANGEAL FUSION, THUMB CARPOMETACARPAL EXCISIONAL ARTHROPLASTY, FLEXOR CARPI RADIALIS GRAFT;  Surgeon: Jerzy Ritchie M.D.;  Location: SURGERY SAME DAY [facility];  Service: Orthopedics   • CHOLECYSTECTOMY     • GASTROSTOMY     • OTHER ORTHOPEDIC SURGERY      Left ankle fusion, right foot surgery   • PRIMARY C SECTION      hysterectomy 1995   • TONSILLECTOMY         Family history   Family History   Problem Relation Age of Onset   • Diabetes Mother    • Lung Cancer Father          Medications:   Outpatient Medications Marked as Taking for the 7/17/20 encounter (Office Visit) with Neftali Painter M.D.   Medication Sig Dispense Refill   • [START ON 9/11/2020] Buprenorphine 10 MCG/HR PATCH WEEKLY Apply 1 Patch to skin as directed every 7 days for 28 days. 4 Patch 0   • [START ON 8/14/2020] Buprenorphine 10 MCG/HR PATCH WEEKLY Apply 1 Patch to skin as directed every 7 days for 28 days. 4 Patch 0   • Buprenorphine 10 MCG/HR PATCH WEEKLY Apply 1 Patch to skin as directed every 7 days for 28 days. 4 Patch 0   • fluticasone (FLONASE) 50 MCG/ACT nasal spray ONE TO TWO SPRAYS IN EACH NOSTRIL DAILY AS NEEDED FOR CONGESTION     • famotidine (PEPCID) 20 MG Tab      • valacyclovir (VALTREX) 1 GM Tab      • polyethylene glycol 3350 (MIRALAX) Powder Take 17 g by mouth 1 time daily as needed. 1 Bottle 2   • " loratadine (CLARITIN) 10 MG Tab Take 10 mg by mouth every day.     • mupirocin (BACTROBAN) 2 % Ointment   1   • amLODIPine (NORVASC) 2.5 MG Tab 7.5 mg 1 time daily as needed. Takes 5mg and 2.5mg for total of 7.5mg  3   • mirtazapine (REMERON) 15 MG Tab TAKE ONCE TABLET BY MOUTH AT BEDTIME.  1   • BREO ELLIPTA 100-25 MCG/INH AEROSOL POWDER, BREATH ACTIVATED INHALE 1 PUFF BY MOUTH EVERY DAY. RINSE MOUTH AFTER USE. 1 Each 5   • albuterol 108 (90 Base) MCG/ACT Aero Soln inhalation aerosol Inhale 2 Puffs by mouth every 6 hours as needed for Shortness of Breath.     • acetaminophen (TYLENOL) 325 MG Tab Take 650 mg by mouth every four hours as needed.     • tiotropium (SPIRIVA HANDIHALER) 18 MCG Cap Inhale 18 mcg by mouth every day.     • Fluticasone Furoate-Vilanterol (BREO ELLIPTA) 100-25 MCG/INH AEROSOL POWDER, BREATH ACTIVATED Inhale 1 Puff by mouth every day. Rinse mouth after use. 1 Each 5   • busPIRone (BUSPAR) 30 MG tablet TAKE ONE TABLET BY MOUTH TWICE PER DAY.  2   • Calcium Carb-Cholecalciferol (CALCIUM 600 + D PO) Take 1 tablet by mouth every day.     • amlodipine (NORVASC) 5 MG Tab Take 5 mg by mouth every day.           Allergies:   Allergies   Allergen Reactions   • Tape Unspecified     Rips off epidermis   • Ambien [Zolpidem] Unspecified     Memory loss   • Augmentin Nausea   • Bloodless      protocal per patient request; MD notified       Social Hx:   Social History     Socioeconomic History   • Marital status:      Spouse name: Not on file   • Number of children: Not on file   • Years of education: Not on file   • Highest education level: Not on file   Occupational History   • Not on file   Social Needs   • Financial resource strain: Not on file   • Food insecurity     Worry: Not on file     Inability: Not on file   • Transportation needs     Medical: Not on file     Non-medical: Not on file   Tobacco Use   • Smoking status: Current Every Day Smoker     Packs/day: 0.50     Years: 47.00     Pack  "years: 23.50     Types: Cigarettes   • Smokeless tobacco: Never Used   • Tobacco comment: \"working on cutting down as a family\"   Substance and Sexual Activity   • Alcohol use: No   • Drug use: No   • Sexual activity: Not on file   Lifestyle   • Physical activity     Days per week: Not on file     Minutes per session: Not on file   • Stress: Not on file   Relationships   • Social connections     Talks on phone: Not on file     Gets together: Not on file     Attends Bahai service: Not on file     Active member of club or organization: Not on file     Attends meetings of clubs or organizations: Not on file     Relationship status: Not on file   • Intimate partner violence     Fear of current or ex partner: Not on file     Emotionally abused: Not on file     Physically abused: Not on file     Forced sexual activity: Not on file   Other Topics Concern   •  Service No   • Blood Transfusions No   • Caffeine Concern No   • Occupational Exposure No   • Hobby Hazards No   • Sleep Concern No   • Stress Concern Yes   • Weight Concern No   • Special Diet No   • Back Care No   • Exercise Yes   • Bike Helmet No   • Seat Belt Yes   • Self-Exams Yes   Social History Narrative   • Not on file         EXAMINATION     Physical Exam:   Vitals: /60 (BP Location: Left arm, Patient Position: Sitting, BP Cuff Size: Adult long)   Pulse 100   Temp 36.9 °C (98.4 °F) (Temporal)   Ht 1.473 m (4' 10\")   Wt 36.5 kg (80 lb 7.5 oz)   SpO2 96%     Constitutional:   Body Habitus: Body mass index is 16.82 kg/m².  Cooperation: Fully cooperates with exam  Appearance: Well-groomed, very thin, not disheveled, in no acute distress    Eyes: No scleral icterus, no proptosis     ENT -no obvious auditory deficits, wearing a face mask    Skin -no rashes or lesions noted,     Respiratory-  breathing comfortable on room air, no audible wheezing    Cardiovascular- No lower extremity edema in the right leg.  Swelling in the left leg with some " calf tenderness.     Psychiatric- alert and oriented ×3. Normal affect.     Gait - Abnormal gait, left KAFO and axillary crutches.        MEDICAL DECISION MAKING    Medical records review: see under HPI section.     DATA  UDS 02/19/2019 Negative for substances, including buprenorphine.  This may test negative with use of butrans patch.  UDS 07/18/2019: Negative for buprenorphine.  Positive for kratom.  UDS 09/05/2019 Negative for buprenorphine.  Positive for low doses of kratom  UDS: 10/29/2019: Buprenorphine is negative, which can be the case with the low doses for butrans patch.  Positive for trace amounts of marijuana and kratom  UDS 12/20/2019 Negative for buprenorphine.  Positive for low dose of kratom and THC   UDS 02/20/2020: Buprenorphine is negative, not unexpected for butrans patch, negative for other substances    Labs:   Lab Results   Component Value Date/Time    SODIUM 137 04/16/2019 01:00 PM    POTASSIUM 4.1 04/16/2019 01:00 PM    CHLORIDE 103 04/16/2019 01:00 PM    CO2 22 04/16/2019 01:00 PM    ANION 12.0 (H) 04/16/2019 01:00 PM    GLUCOSE 78 04/16/2019 01:00 PM    BUN 8 04/16/2019 01:00 PM    CREATININE 0.38 (L) 04/16/2019 01:00 PM    CALCIUM 9.3 04/16/2019 01:00 PM    ASTSGOT 20 04/16/2019 01:00 PM    ALTSGPT 11 04/16/2019 01:00 PM    TBILIRUBIN 0.3 04/16/2019 01:00 PM    ALBUMIN 4.2 04/16/2019 01:00 PM    TOTPROTEIN 7.8 04/16/2019 01:00 PM    GLOBULIN 3.6 (H) 04/16/2019 01:00 PM    AGRATIO 1.2 04/16/2019 01:00 PM          Lab Results   Component Value Date/Time    WBC 11.2 (H) 04/16/2019 01:00 PM    RBC 4.21 04/16/2019 01:00 PM    HEMOGLOBIN 12.0 04/16/2019 01:00 PM    HEMATOCRIT 38.7 04/16/2019 01:00 PM    MCV 91.9 04/16/2019 01:00 PM    MCH 28.5 04/16/2019 01:00 PM    MCHC 31.0 (L) 04/16/2019 01:00 PM    MPV 10.2 04/16/2019 01:00 PM    NEUTSPOLYS 68.30 04/16/2019 01:00 PM    LYMPHOCYTES 24.60 04/16/2019 01:00 PM    MONOCYTES 6.20 04/16/2019 01:00 PM    EOSINOPHILS 0.30 04/16/2019 01:00 PM     BASOPHILS 0.40 04/16/2019 01:00 PM           Imaging:  MRI lumbar spine: Reviewed, this is my interpretation   12/17/2018  There is note of multilevel disc bulges without central canal stenosis.  There is lumbar facet hypertrophy noted in the lumbar facets bilaterally.  Foraminal canal stenosis is moderate right L2-3, mild L4-5 bilaterally.    MRI lumbar spine report reviewed  12/17/2018 Multilevel degenerative changes of the lumbar spine as described                                                                                                                                                                    DEXA: 04/10/2019  According to the World Health Organization classification, bone mineral density of this patient is osteoporosis with high risk of fracture in the femoral region and normal in the lumbar region.    10-year Probability of Fracture:  Major Osteoporotic     21.1%  Hip     6.6%  Population      [place] ()    Based on left femur neck BMD      Diagnosis   Visit Diagnoses     ICD-10-CM   1. Post-polio syndrome  G14   2. Other idiopathic scoliosis, thoracolumbar region  M41.25   3. Lumbar spondylosis  M47.816   4. Low back pain, unspecified back pain laterality, unspecified chronicity, unspecified whether sciatica present  M54.5   5. Pain of left lower extremity  M79.605   6. Left leg swelling  M79.89       ASSESSMENT:  Jewell Skelton 69 y.o. female with history of polio, scoliosis, low back pain with radiation     Jewell was seen today for follow-up.    Diagnoses and all orders for this visit:    Post-polio syndrome  -     Buprenorphine 10 MCG/HR PATCH WEEKLY; Apply 1 Patch to skin as directed every 7 days for 28 days.  -     Buprenorphine 10 MCG/HR PATCH WEEKLY; Apply 1 Patch to skin as directed every 7 days for 28 days.  -     Buprenorphine 10 MCG/HR PATCH WEEKLY; Apply 1 Patch to skin as directed every 7 days for 28 days.  -     Consent for Opiate Prescription  -     Controlled Substance  Treatment Agreement    Other idiopathic scoliosis, thoracolumbar region  -     Buprenorphine 10 MCG/HR PATCH WEEKLY; Apply 1 Patch to skin as directed every 7 days for 28 days.  -     Buprenorphine 10 MCG/HR PATCH WEEKLY; Apply 1 Patch to skin as directed every 7 days for 28 days.  -     Buprenorphine 10 MCG/HR PATCH WEEKLY; Apply 1 Patch to skin as directed every 7 days for 28 days.  -     Consent for Opiate Prescription  -     Controlled Substance Treatment Agreement    Lumbar spondylosis    Low back pain, unspecified back pain laterality, unspecified chronicity, unspecified whether sciatica present  -     Buprenorphine 10 MCG/HR PATCH WEEKLY; Apply 1 Patch to skin as directed every 7 days for 28 days.  -     Buprenorphine 10 MCG/HR PATCH WEEKLY; Apply 1 Patch to skin as directed every 7 days for 28 days.  -     Buprenorphine 10 MCG/HR PATCH WEEKLY; Apply 1 Patch to skin as directed every 7 days for 28 days.  -     Consent for Opiate Prescription  -     Controlled Substance Treatment Agreement    Pain of left lower extremity  -     US-EXTREMITY VENOUS LOWER UNILAT LEFT; Future    Left leg swelling  -     US-EXTREMITY VENOUS LOWER UNILAT LEFT; Future       1. Discussed concern about left leg pain and swelling.  US ordered to rule out DVT.  If negative, will consider this may be related to history of fracture.    2. Continue buprenorphine 10mcg patch.   and most recent UDS reviewed.  Continue rotation of batch.  No need for OTC medications for regular bowel movements.  3. Continue calcium and vitamin D in divided doses twice a day for osteoporosis and vitamin D deficiency.   4. Continue to monitor skin for irritation related to the KAFO.  5. Discussed that she is contemplating smoking cessation again.  Previously, information was given about the quitting. She is still in pre-contemplation.  We discussed this for 3 minutes        Follow-up: Follow-up in 12 weeks, prn after diagnostic study    Please note that this  dictation was created using voice recognition software. I have made every reasonable attempt to correct obvious errors but there may be errors of grammar and content that I may have overlooked prior to finalization of this note.      Neftali Painter MD  Physical Medicine and Rehabilitation  Interventional Spine and Sports Physiatry  [facility]

## 2020-07-23 ENCOUNTER — HOSPITAL ENCOUNTER (OUTPATIENT)
Dept: RADIOLOGY | Facility: MEDICAL CENTER | Age: 70
End: 2020-07-23
Attending: PHYSICAL MEDICINE & REHABILITATION
Payer: MEDICARE

## 2020-07-23 DIAGNOSIS — M79.605 PAIN OF LEFT LOWER EXTREMITY: ICD-10-CM

## 2020-07-23 DIAGNOSIS — M79.89 LEFT LEG SWELLING: ICD-10-CM

## 2020-07-23 PROCEDURE — 93971 EXTREMITY STUDY: CPT | Mod: LT

## 2020-07-30 ENCOUNTER — HOSPITAL ENCOUNTER (OUTPATIENT)
Dept: LAB | Facility: MEDICAL CENTER | Age: 70
End: 2020-07-30
Attending: PHYSICIAN ASSISTANT
Payer: MEDICARE

## 2020-07-30 LAB
ALBUMIN SERPL BCP-MCNC: 3.9 G/DL (ref 3.2–4.9)
ALBUMIN/GLOB SERPL: 1.1 G/DL
ALP SERPL-CCNC: 99 U/L (ref 30–99)
ALT SERPL-CCNC: 8 U/L (ref 2–50)
ANION GAP SERPL CALC-SCNC: 11 MMOL/L (ref 7–16)
AST SERPL-CCNC: 19 U/L (ref 12–45)
BASOPHILS # BLD AUTO: 0.8 % (ref 0–1.8)
BASOPHILS # BLD: 0.11 K/UL (ref 0–0.12)
BILIRUB SERPL-MCNC: 0.2 MG/DL (ref 0.1–1.5)
BUN SERPL-MCNC: 6 MG/DL (ref 8–22)
CALCIUM SERPL-MCNC: 8.9 MG/DL (ref 8.5–10.5)
CHLORIDE SERPL-SCNC: 96 MMOL/L (ref 96–112)
CHOLEST SERPL-MCNC: 110 MG/DL (ref 100–199)
CO2 SERPL-SCNC: 23 MMOL/L (ref 20–33)
CREAT SERPL-MCNC: 0.31 MG/DL (ref 0.5–1.4)
EOSINOPHIL # BLD AUTO: 0.15 K/UL (ref 0–0.51)
EOSINOPHIL NFR BLD: 1.1 % (ref 0–6.9)
ERYTHROCYTE [DISTWIDTH] IN BLOOD BY AUTOMATED COUNT: 53.9 FL (ref 35.9–50)
FASTING STATUS PATIENT QL REPORTED: NORMAL
GLOBULIN SER CALC-MCNC: 3.5 G/DL (ref 1.9–3.5)
GLUCOSE SERPL-MCNC: 82 MG/DL (ref 65–99)
HCT VFR BLD AUTO: 35.8 % (ref 37–47)
HDLC SERPL-MCNC: 42 MG/DL
HGB BLD-MCNC: 10.9 G/DL (ref 12–16)
IMM GRANULOCYTES # BLD AUTO: 0.07 K/UL (ref 0–0.11)
IMM GRANULOCYTES NFR BLD AUTO: 0.5 % (ref 0–0.9)
LDLC SERPL CALC-MCNC: 54 MG/DL
LYMPHOCYTES # BLD AUTO: 3.18 K/UL (ref 1–4.8)
LYMPHOCYTES NFR BLD: 22.3 % (ref 22–41)
MCH RBC QN AUTO: 25.5 PG (ref 27–33)
MCHC RBC AUTO-ENTMCNC: 30.4 G/DL (ref 33.6–35)
MCV RBC AUTO: 83.8 FL (ref 81.4–97.8)
MONOCYTES # BLD AUTO: 0.86 K/UL (ref 0–0.85)
MONOCYTES NFR BLD AUTO: 6 % (ref 0–13.4)
NEUTROPHILS # BLD AUTO: 9.87 K/UL (ref 2–7.15)
NEUTROPHILS NFR BLD: 69.3 % (ref 44–72)
NRBC # BLD AUTO: 0 K/UL
NRBC BLD-RTO: 0 /100 WBC
PLATELET # BLD AUTO: 535 K/UL (ref 164–446)
PMV BLD AUTO: 9.1 FL (ref 9–12.9)
POTASSIUM SERPL-SCNC: 4.5 MMOL/L (ref 3.6–5.5)
PROT SERPL-MCNC: 7.4 G/DL (ref 6–8.2)
RBC # BLD AUTO: 4.27 M/UL (ref 4.2–5.4)
SODIUM SERPL-SCNC: 130 MMOL/L (ref 135–145)
TRIGL SERPL-MCNC: 72 MG/DL (ref 0–149)
TSH SERPL DL<=0.005 MIU/L-ACNC: 2.39 UIU/ML (ref 0.38–5.33)
WBC # BLD AUTO: 14.2 K/UL (ref 4.8–10.8)

## 2020-07-30 PROCEDURE — 80061 LIPID PANEL: CPT

## 2020-07-30 PROCEDURE — 36415 COLL VENOUS BLD VENIPUNCTURE: CPT

## 2020-07-30 PROCEDURE — 80053 COMPREHEN METABOLIC PANEL: CPT

## 2020-07-30 PROCEDURE — 85025 COMPLETE CBC W/AUTO DIFF WBC: CPT

## 2020-07-30 PROCEDURE — 84443 ASSAY THYROID STIM HORMONE: CPT

## 2020-10-02 ENCOUNTER — TELEMEDICINE (OUTPATIENT)
Dept: PHYSICAL MEDICINE AND REHAB | Facility: MEDICAL CENTER | Age: 70
End: 2020-10-02
Payer: MEDICARE

## 2020-10-02 VITALS — HEART RATE: 100 BPM | WEIGHT: 80 LBS | BODY MASS INDEX: 16.13 KG/M2 | HEIGHT: 59 IN

## 2020-10-02 DIAGNOSIS — M41.25 OTHER IDIOPATHIC SCOLIOSIS, THORACOLUMBAR REGION: ICD-10-CM

## 2020-10-02 DIAGNOSIS — M47.816 LUMBAR SPONDYLOSIS: ICD-10-CM

## 2020-10-02 DIAGNOSIS — M79.605 PAIN OF LEFT LOWER EXTREMITY: ICD-10-CM

## 2020-10-02 DIAGNOSIS — M54.50 LOW BACK PAIN, UNSPECIFIED BACK PAIN LATERALITY, UNSPECIFIED CHRONICITY, UNSPECIFIED WHETHER SCIATICA PRESENT: ICD-10-CM

## 2020-10-02 DIAGNOSIS — G14 POST-POLIO SYNDROME: ICD-10-CM

## 2020-10-02 DIAGNOSIS — Z71.6 TOBACCO ABUSE COUNSELING: ICD-10-CM

## 2020-10-02 PROCEDURE — 99214 OFFICE O/P EST MOD 30 MIN: CPT | Mod: 95,CR | Performed by: PHYSICAL MEDICINE & REHABILITATION

## 2020-10-02 RX ORDER — BUPRENORPHINE 10 UG/H
1 PATCH TRANSDERMAL
Qty: 4 PATCH | Refills: 0 | Status: SHIPPED | OUTPATIENT
Start: 2020-11-09 | End: 2020-12-07

## 2020-10-02 RX ORDER — BUPRENORPHINE 10 UG/H
1 PATCH TRANSDERMAL
Qty: 4 PATCH | Refills: 0 | Status: SHIPPED | OUTPATIENT
Start: 2020-10-12 | End: 2020-11-09

## 2020-10-02 RX ORDER — BUPRENORPHINE 10 UG/H
1 PATCH TRANSDERMAL
Qty: 4 PATCH | Refills: 0 | Status: SHIPPED | OUTPATIENT
Start: 2020-12-07 | End: 2021-01-08 | Stop reason: SDUPTHER

## 2020-10-02 ASSESSMENT — PATIENT HEALTH QUESTIONNAIRE - PHQ9: CLINICAL INTERPRETATION OF PHQ2 SCORE: 0

## 2020-10-02 ASSESSMENT — PAIN SCALES - GENERAL: PAINLEVEL: 6=MODERATE PAIN

## 2020-10-02 ASSESSMENT — FIBROSIS 4 INDEX: FIB4 SCORE: 0.85

## 2020-10-02 NOTE — PROGRESS NOTES
Telemedicine: Established Patient   This evaluation was conducted via [employer] videodialer using secure and encrypted videoconferencing technology. The patient was in a private location in the state of [place].  Audio and video consent obtained.  The patient's identity was confirmed and verbal consent was obtained for this virtual visit.    Subjective:   CC:   Chief Complaint   Patient presents with   • Follow-Up     Lower back       Jewell Celeste Skelton is a 70 y.o. female presenting for evaluation and management of:    Since the last visit, her pain has been about the same.  No falls. She ambulates with use of axillary crutches and wears her left KAFO.  The swelling in her left leg has been improving.  No radicular symptoms.  Back pain is intermittent.    She is using butrans patch, which helps to manage her pain.  Bowel movements are regular. Skin is doing well with rotating patches.    Smoking cessation has been difficult for her.  Discussed that she has been smoking more.  Discussed that she does use inhalers, but is not using oxygen at this time.    ROS  Unchanged since 07/17/2020    Allergies   Allergen Reactions   • Tape Unspecified     Rips off epidermis   • Ambien [Zolpidem] Unspecified     Memory loss   • Augmentin Nausea   • Bloodless      protocal per patient request; MD notified       Current medicines (including changes today)  Current Outpatient Medications   Medication Sig Dispense Refill   • [START ON 10/12/2020] Buprenorphine 10 MCG/HR PATCH WEEKLY Apply 1 Patch to skin as directed every 7 days for 28 days. 4 Patch 0   • [START ON 11/9/2020] Buprenorphine 10 MCG/HR PATCH WEEKLY Apply 1 Patch to skin as directed every 7 days for 28 days. 4 Patch 0   • [START ON 12/7/2020] Buprenorphine 10 MCG/HR PATCH WEEKLY Apply 1 Patch to skin as directed every 7 days for 28 days. 4 Patch 0   • fluticasone (FLONASE) 50 MCG/ACT nasal spray ONE TO TWO SPRAYS IN EACH NOSTRIL DAILY AS NEEDED FOR CONGESTION     •  famotidine (PEPCID) 20 MG Tab      • valacyclovir (VALTREX) 1 GM Tab      • polyethylene glycol 3350 (MIRALAX) Powder Take 17 g by mouth 1 time daily as needed. 1 Bottle 2   • mupirocin (BACTROBAN) 2 % Ointment   1   • amLODIPine (NORVASC) 2.5 MG Tab 7.5 mg 1 time daily as needed. Takes 5mg and 2.5mg for total of 7.5mg  3   • mirtazapine (REMERON) 15 MG Tab TAKE ONCE TABLET BY MOUTH AT BEDTIME.  1   • BREO ELLIPTA 100-25 MCG/INH AEROSOL POWDER, BREATH ACTIVATED INHALE 1 PUFF BY MOUTH EVERY DAY. RINSE MOUTH AFTER USE. 1 Each 5   • albuterol 108 (90 Base) MCG/ACT Aero Soln inhalation aerosol Inhale 2 Puffs by mouth every 6 hours as needed for Shortness of Breath.     • acetaminophen (TYLENOL) 325 MG Tab Take 650 mg by mouth every four hours as needed.     • tiotropium (SPIRIVA HANDIHALER) 18 MCG Cap Inhale 18 mcg by mouth every day.     • Fluticasone Furoate-Vilanterol (BREO ELLIPTA) 100-25 MCG/INH AEROSOL POWDER, BREATH ACTIVATED Inhale 1 Puff by mouth every day. Rinse mouth after use. 1 Each 5   • busPIRone (BUSPAR) 30 MG tablet TAKE ONE TABLET BY MOUTH TWICE PER DAY.  2   • Calcium Carb-Cholecalciferol (CALCIUM 600 + D PO) Take 1 tablet by mouth every day.     • amlodipine (NORVASC) 5 MG Tab Take 5 mg by mouth every day.     • loratadine (CLARITIN) 10 MG Tab Take 10 mg by mouth every day.       No current facility-administered medications for this visit.        Patient Active Problem List    Diagnosis Date Noted   • Tobacco abuse counseling 05/16/2018   • Pulmonary nodules 05/16/2018   • Tobacco use 04/16/2018   • COPD (chronic obstructive pulmonary disease) ([facility]) 09/08/2017   • Abnormal radiologic finding of lung field 09/08/2017       Family History   Problem Relation Age of Onset   • Diabetes Mother    • Lung Cancer Father        She  has a past medical history of Abnormal radiologic finding of lung field (9/8/2017), Anesthesia, Arthritis (10/31/2018), Asthma, Back pain, Breath shortness, Cancer ([facility]) (2013),  "Chickenpox, COPD (chronic obstructive pulmonary disease) ([facility]) (9/8/2017), Dental disorder (10/31/2018), Emphysema of lung ([facility]), Fatigue, Portuguese measles, Heart burn, Hepatitis C, Hypertension (10/31/2018), Influenza, Mumps, Polio, Seizure ([facility]), and Wears glasses.  She  has a past surgical history that includes cholecystectomy; gastrostomy; tonsillectomy; other orthopedic surgery; primary c section; and finger arthroplasty (Right, 11/2/2018).       Objective:   Pulse 100   Ht 1.499 m (4' 11\")   Wt 36.3 kg (80 lb)   BMI 16.16 kg/m²     Physical Exam     Gen: Patient is alert and cooperative  Appearances: well-groomed, think, no acute distress  Eyes: no scleral icterus, no proptosis  ENT: no obvious auditory deficits  Resp: breathing comfortably on room air, no audible wheezing  Gait: not assessed    Assessment and Plan:   The following treatment plan was discussed:     1. Post-polio syndrome  - Buprenorphine 10 MCG/HR PATCH WEEKLY; Apply 1 Patch to skin as directed every 7 days for 28 days.  Dispense: 4 Patch; Refill: 0  - Buprenorphine 10 MCG/HR PATCH WEEKLY; Apply 1 Patch to skin as directed every 7 days for 28 days.  Dispense: 4 Patch; Refill: 0  - Buprenorphine 10 MCG/HR PATCH WEEKLY; Apply 1 Patch to skin as directed every 7 days for 28 days.  Dispense: 4 Patch; Refill: 0  - Consent for Opiate Prescription  - Controlled Substance Treatment Agreement    2. Other idiopathic scoliosis, thoracolumbar region  - Buprenorphine 10 MCG/HR PATCH WEEKLY; Apply 1 Patch to skin as directed every 7 days for 28 days.  Dispense: 4 Patch; Refill: 0  - Buprenorphine 10 MCG/HR PATCH WEEKLY; Apply 1 Patch to skin as directed every 7 days for 28 days.  Dispense: 4 Patch; Refill: 0  - Buprenorphine 10 MCG/HR PATCH WEEKLY; Apply 1 Patch to skin as directed every 7 days for 28 days.  Dispense: 4 Patch; Refill: 0  - Consent for Opiate Prescription  - Controlled Substance Treatment Agreement    3. Lumbar spondylosis    4. Low back " pain, unspecified back pain laterality, unspecified chronicity, unspecified whether sciatica present  - Buprenorphine 10 MCG/HR PATCH WEEKLY; Apply 1 Patch to skin as directed every 7 days for 28 days.  Dispense: 4 Patch; Refill: 0  - Buprenorphine 10 MCG/HR PATCH WEEKLY; Apply 1 Patch to skin as directed every 7 days for 28 days.  Dispense: 4 Patch; Refill: 0  - Buprenorphine 10 MCG/HR PATCH WEEKLY; Apply 1 Patch to skin as directed every 7 days for 28 days.  Dispense: 4 Patch; Refill: 0  - Consent for Opiate Prescription  - Controlled Substance Treatment Agreement    5. Pain of left lower extremity    6. Tobacco abuse counseling    1. Discussed that she will continue activity as tolerated.  2. Refilled butrans patches for the next three months.    3. Left lower extremity swelling has improved, per patient and daughter.   4. Encouraged smoking cessation, less than 3 minutes of counseling today.  5.  reviewed.  Continue to monitor.  Opioid agreement is on file in the office.    Follow-up: Return in about 10 weeks (around 12/11/2020).

## 2021-01-08 ENCOUNTER — OFFICE VISIT (OUTPATIENT)
Dept: PHYSICAL MEDICINE AND REHAB | Facility: MEDICAL CENTER | Age: 71
End: 2021-01-08
Payer: MEDICARE

## 2021-01-08 VITALS
DIASTOLIC BLOOD PRESSURE: 60 MMHG | BODY MASS INDEX: 17.12 KG/M2 | RESPIRATION RATE: 14 BRPM | HEART RATE: 105 BPM | SYSTOLIC BLOOD PRESSURE: 112 MMHG | TEMPERATURE: 97.2 F | WEIGHT: 81.57 LBS | OXYGEN SATURATION: 95 % | HEIGHT: 58 IN

## 2021-01-08 DIAGNOSIS — Z72.0 TOBACCO USE: ICD-10-CM

## 2021-01-08 DIAGNOSIS — M54.50 LOW BACK PAIN, UNSPECIFIED BACK PAIN LATERALITY, UNSPECIFIED CHRONICITY, UNSPECIFIED WHETHER SCIATICA PRESENT: ICD-10-CM

## 2021-01-08 DIAGNOSIS — M81.0 OSTEOPOROSIS, UNSPECIFIED OSTEOPOROSIS TYPE, UNSPECIFIED PATHOLOGICAL FRACTURE PRESENCE: ICD-10-CM

## 2021-01-08 DIAGNOSIS — G14 POST-POLIO SYNDROME: ICD-10-CM

## 2021-01-08 DIAGNOSIS — M41.25 OTHER IDIOPATHIC SCOLIOSIS, THORACOLUMBAR REGION: ICD-10-CM

## 2021-01-08 PROCEDURE — 99214 OFFICE O/P EST MOD 30 MIN: CPT | Performed by: PHYSICAL MEDICINE & REHABILITATION

## 2021-01-08 RX ORDER — BUPRENORPHINE 10 UG/H
1 PATCH TRANSDERMAL
Qty: 4 PATCH | Refills: 0 | Status: SHIPPED | OUTPATIENT
Start: 2021-03-05 | End: 2021-03-26

## 2021-01-08 RX ORDER — BUPRENORPHINE 10 UG/H
1 PATCH TRANSDERMAL
Qty: 4 PATCH | Refills: 0 | Status: SHIPPED | OUTPATIENT
Start: 2021-02-05 | End: 2021-03-05

## 2021-01-08 RX ORDER — BUPRENORPHINE 10 UG/H
1 PATCH TRANSDERMAL
Qty: 4 PATCH | Refills: 0 | Status: SHIPPED | OUTPATIENT
Start: 2021-01-08 | End: 2021-01-08 | Stop reason: SDUPTHER

## 2021-01-08 ASSESSMENT — PATIENT HEALTH QUESTIONNAIRE - PHQ9: CLINICAL INTERPRETATION OF PHQ2 SCORE: 0

## 2021-01-08 ASSESSMENT — FIBROSIS 4 INDEX: FIB4 SCORE: 0.85

## 2021-01-08 ASSESSMENT — PAIN SCALES - GENERAL: PAINLEVEL: 7=MODERATE-SEVERE PAIN

## 2021-01-08 NOTE — PROGRESS NOTES
Follow-up patient note    Physiatry (physical medicine and  Rehabilitation), interventional spine and sports medicine    Date of Service:01/08/2021    Chief complaint: Chronic pain    HISTORY    HPI: Jewell Skelton 70 y.o. female who presents today, accompanied by her daughter, for follow-up of pain related to her scoliosis, low back and history of post-polio.      Since her last visit, Jewell reports that her pain has been stable.  She continues to have pain, though.  She reports that her pain is a 7/10 on the NRS at the worst, but is not interfering with her usual activities.  Tolerating the butrans patch 10mcg/hr.  Bowel movements are regular.  No skin lesions related.      She does have a rash on her face and forehead and has been trying to get to a dermatologist.  She does have a history of squamous cell carcinoma on her nose and needs to have follow-up.    She had been doing better with smoking, but has returned to baseline.  This is still around 15/day.    No falls since last visit.    ORT: 15   reviewed today.    PHQ9 -0    Medical records review:  Previous treatments:    Physical Therapy: Yes, in the past    Medications the patient is tried: Narcotics, tylenol, muscle relaxants      Previous interventions: yes    Previous surgeries to relieve the above pain:  none      ROS: Unchanged from previous visit 07/17/2020 except as noted in the HPI  Eyes: Wears glasses  ENT: history of post-nasal drip  Resp:COPD  GI:h/o ulcers, acid reflux, Hep C  Skin: skin cancer  Heme: history of bleeding    Red Flags ROS:   Fever, Chills, Sweats: Denies  Involuntary Weight Loss: Denies  Bladder Incontinence: Denies  Bowel Incontinence: denies  Saddle Anesthesia: Denies    All other systems reviewed and negative.       PMHx:   Past Medical History:   Diagnosis Date   • Abnormal radiologic finding of lung field 9/8/2017    History of bilateral pulmonary nodules   • Anesthesia     patient's mom had side effects   • Arthritis  "10/31/2018    \"everywhere\"   • Asthma    • Back pain    • Breath shortness    • Cancer (HCC) 2013    squamous cell   • Chickenpox    • COPD (chronic obstructive pulmonary disease) (HCC) 9/8/2017   • Dental disorder 10/31/2018    upper   • Emphysema of lung (HCC)    • Fatigue    • Thai measles    • Heart burn    • Hepatitis C     dx 10/2018   • Hypertension 10/31/2018   • Influenza    • Mumps    • Polio    • Seizure (HCC)     \"possibly 1 time in 2000\"   • Wears glasses        PSHx:   Past Surgical History:   Procedure Laterality Date   • FINGER ARTHROPLASTY Right 11/2/2018    Procedure: FINGER ARTHROPLASTY - THUMB CARPOMETACARPAL FUSION VERSUS THUMB METACARPOPHALANGEAL FUSION, THUMB CARPOMETACARPAL EXCISIONAL ARTHROPLASTY, FLEXOR CARPI RADIALIS GRAFT;  Surgeon: Jerzy Ritchie M.D.;  Location: SURGERY SAME DAY [facility];  Service: Orthopedics   • CHOLECYSTECTOMY     • GASTROSTOMY     • OTHER ORTHOPEDIC SURGERY      Left ankle fusion, right foot surgery   • PRIMARY C SECTION      hysterectomy 1995   • TONSILLECTOMY         Family history   Family History   Problem Relation Age of Onset   • Diabetes Mother    • Lung Cancer Father          Medications:   Outpatient Medications Marked as Taking for the 1/8/21 encounter (Office Visit) with Neftali Painter M.D.   Medication Sig Dispense Refill   • [START ON 2/5/2021] Buprenorphine 10 MCG/HR PATCH WEEKLY Place 1 Patch on the skin every 7 days for 28 days. 4 Patch 0   • [START ON 3/5/2021] Buprenorphine 10 MCG/HR PATCH WEEKLY Place 1 Patch on the skin every 7 days for 28 days. 4 Patch 0   • fluticasone (FLONASE) 50 MCG/ACT nasal spray ONE TO TWO SPRAYS IN EACH NOSTRIL DAILY AS NEEDED FOR CONGESTION     • famotidine (PEPCID) 20 MG Tab      • valacyclovir (VALTREX) 1 GM Tab      • polyethylene glycol 3350 (MIRALAX) Powder Take 17 g by mouth 1 time daily as needed. 1 Bottle 2   • loratadine (CLARITIN) 10 MG Tab Take 10 mg by mouth every day.     • mupirocin " (BACTROBAN) 2 % Ointment   1   • amLODIPine (NORVASC) 2.5 MG Tab 7.5 mg 1 time daily as needed. Takes 5mg and 2.5mg for total of 7.5mg  3   • mirtazapine (REMERON) 15 MG Tab Take 30 mg by mouth every day.  1   • BREO ELLIPTA 100-25 MCG/INH AEROSOL POWDER, BREATH ACTIVATED INHALE 1 PUFF BY MOUTH EVERY DAY. RINSE MOUTH AFTER USE. 1 Each 5   • albuterol 108 (90 Base) MCG/ACT Aero Soln inhalation aerosol Inhale 2 Puffs by mouth every 6 hours as needed for Shortness of Breath.     • acetaminophen (TYLENOL) 325 MG Tab Take 650 mg by mouth every four hours as needed.     • tiotropium (SPIRIVA HANDIHALER) 18 MCG Cap Inhale 18 mcg by mouth every day.     • Fluticasone Furoate-Vilanterol (BREO ELLIPTA) 100-25 MCG/INH AEROSOL POWDER, BREATH ACTIVATED Inhale 1 Puff by mouth every day. Rinse mouth after use. 1 Each 5   • busPIRone (BUSPAR) 30 MG tablet TAKE ONE TABLET BY MOUTH TWICE PER DAY.  2   • Calcium Carb-Cholecalciferol (CALCIUM 600 + D PO) Take 1 tablet by mouth every day.     • amlodipine (NORVASC) 5 MG Tab Take 5 mg by mouth every day.           Allergies:   Allergies   Allergen Reactions   • Tape Unspecified     Rips off epidermis   • Ambien [Zolpidem] Unspecified     Memory loss   • Augmentin Nausea   • Bloodless      protocal per patient request; MD notified       Social Hx:   Social History     Socioeconomic History   • Marital status:      Spouse name: Not on file   • Number of children: Not on file   • Years of education: Not on file   • Highest education level: Not on file   Occupational History   • Not on file   Social Needs   • Financial resource strain: Not on file   • Food insecurity     Worry: Not on file     Inability: Not on file   • Transportation needs     Medical: Not on file     Non-medical: Not on file   Tobacco Use   • Smoking status: Current Every Day Smoker     Packs/day: 0.50     Years: 47.00     Pack years: 23.50     Types: Cigarettes   • Smokeless tobacco: Never Used   • Tobacco  "comment: \"working on cutting down as a family\"   Substance and Sexual Activity   • Alcohol use: No   • Drug use: No   • Sexual activity: Not on file   Lifestyle   • Physical activity     Days per week: Not on file     Minutes per session: Not on file   • Stress: Not on file   Relationships   • Social connections     Talks on phone: Not on file     Gets together: Not on file     Attends Synagogue service: Not on file     Active member of club or organization: Not on file     Attends meetings of clubs or organizations: Not on file     Relationship status: Not on file   • Intimate partner violence     Fear of current or ex partner: Not on file     Emotionally abused: Not on file     Physically abused: Not on file     Forced sexual activity: Not on file   Other Topics Concern   •  Service No   • Blood Transfusions No   • Caffeine Concern No   • Occupational Exposure No   • Hobby Hazards No   • Sleep Concern No   • Stress Concern Yes   • Weight Concern No   • Special Diet No   • Back Care No   • Exercise Yes   • Bike Helmet No   • Seat Belt Yes   • Self-Exams Yes   Social History Narrative   • Not on file         EXAMINATION     Physical Exam:   Vitals: /60 (BP Location: Right arm, Patient Position: Sitting, BP Cuff Size: Adult)   Pulse (!) 105   Temp 36.2 °C (97.2 °F) (Temporal)   Resp 14   Ht 1.473 m (4' 10\")   Wt 37 kg (81 lb 9.1 oz)   SpO2 95%     Constitutional:   Body Habitus: Body mass index is 17.05 kg/m².  Cooperation: Fully cooperates with exam  Appearance: Well-groomed, very thin, not disheveled, in no acute distress    Eyes: No scleral icterus, no proptosis     ENT -no obvious auditory deficits, wearing a face mask    Skin -no rashes or lesions noted,     Respiratory-  breathing comfortable on room air, no audible wheezing    Cardiovascular- No lower extremity edema in the right leg.  Mild swelling in the left leg without calf tenderness    Psychiatric- alert and oriented ×3. Normal " affect.     Gait - Abnormal gait, left KAFO and axillary crutches.        MEDICAL DECISION MAKING    Medical records review: see under HPI section.     DATA  UDS 02/19/2019 Negative for substances, including buprenorphine.  This may test negative with use of butrans patch.  UDS 07/18/2019: Negative for buprenorphine.  Positive for kratom.  UDS 09/05/2019 Negative for buprenorphine.  Positive for low doses of kratom  UDS: 10/29/2019: Buprenorphine is negative, which can be the case with the low doses for butrans patch.  Positive for trace amounts of marijuana and kratom  UDS 12/20/2019 Negative for buprenorphine.  Positive for low dose of kratom and THC   UDS 02/20/2020: Buprenorphine is negative, not unexpected for butrans patch, negative for other substances    Labs:   Lab Results   Component Value Date/Time    SODIUM 130 (L) 07/30/2020 10:06 AM    POTASSIUM 4.5 07/30/2020 10:06 AM    CHLORIDE 96 07/30/2020 10:06 AM    CO2 23 07/30/2020 10:06 AM    ANION 11.0 07/30/2020 10:06 AM    GLUCOSE 82 07/30/2020 10:06 AM    BUN 6 (L) 07/30/2020 10:06 AM    CREATININE 0.31 (L) 07/30/2020 10:06 AM    CALCIUM 8.9 07/30/2020 10:06 AM    ASTSGOT 19 07/30/2020 10:06 AM    ALTSGPT 8 07/30/2020 10:06 AM    TBILIRUBIN 0.2 07/30/2020 10:06 AM    ALBUMIN 3.9 07/30/2020 10:06 AM    TOTPROTEIN 7.4 07/30/2020 10:06 AM    GLOBULIN 3.5 07/30/2020 10:06 AM    AGRATIO 1.1 07/30/2020 10:06 AM          Lab Results   Component Value Date/Time    WBC 14.2 (H) 07/30/2020 10:06 AM    RBC 4.27 07/30/2020 10:06 AM    HEMOGLOBIN 10.9 (L) 07/30/2020 10:06 AM    HEMATOCRIT 35.8 (L) 07/30/2020 10:06 AM    MCV 83.8 07/30/2020 10:06 AM    MCH 25.5 (L) 07/30/2020 10:06 AM    MCHC 30.4 (L) 07/30/2020 10:06 AM    MPV 9.1 07/30/2020 10:06 AM    NEUTSPOLYS 69.30 07/30/2020 10:06 AM    LYMPHOCYTES 22.30 07/30/2020 10:06 AM    MONOCYTES 6.00 07/30/2020 10:06 AM    EOSINOPHILS 1.10 07/30/2020 10:06 AM    BASOPHILS 0.80 07/30/2020 10:06 AM           Imaging:  MRI  lumbar spine: Reviewed, this is my interpretation   12/17/2018  There is note of multilevel disc bulges without central canal stenosis.  There is lumbar facet hypertrophy noted in the lumbar facets bilaterally.  Foraminal canal stenosis is moderate right L2-3, mild L4-5 bilaterally.    MRI lumbar spine report reviewed  12/17/2018 Multilevel degenerative changes of the lumbar spine as described                                                                                                                                                                    DEXA: 04/10/2019  According to the World Health Organization classification, bone mineral density of this patient is osteoporosis with high risk of fracture in the femoral region and normal in the lumbar region.    10-year Probability of Fracture:  Major Osteoporotic     21.1%  Hip     6.6%  Population      [place] ()    Based on left femur neck BMD      Diagnosis   Visit Diagnoses     ICD-10-CM   1. Post-polio syndrome  G14   2. Other idiopathic scoliosis, thoracolumbar region  M41.25   3. Low back pain, unspecified back pain laterality, unspecified chronicity, unspecified whether sciatica present  M54.5   4. Osteoporosis, unspecified osteoporosis type, unspecified pathological fracture presence  M81.0   5. Tobacco use  Z72.0       ASSESSMENT:  Jewell Skelton 69 y.o. female with history of polio, scoliosis, low back pain with radiation     Jewell was seen today for follow-up.    Diagnoses and all orders for this visit:    Post-polio syndrome  -     Discontinue: Buprenorphine 10 MCG/HR PATCH WEEKLY; Place 1 Patch on the skin every 7 days for 28 days.  -     Consent for Opiate Prescription  -     Controlled Substance Treatment Agreement  -     Buprenorphine 10 MCG/HR PATCH WEEKLY; Place 1 Patch on the skin every 7 days for 28 days.  -     Buprenorphine 10 MCG/HR PATCH WEEKLY; Place 1 Patch on the skin every 7 days for 28 days.    Other idiopathic scoliosis,  thoracolumbar region  -     Pain Management Screen  -     Discontinue: Buprenorphine 10 MCG/HR PATCH WEEKLY; Place 1 Patch on the skin every 7 days for 28 days.  -     Consent for Opiate Prescription  -     Controlled Substance Treatment Agreement  -     Buprenorphine 10 MCG/HR PATCH WEEKLY; Place 1 Patch on the skin every 7 days for 28 days.  -     Buprenorphine 10 MCG/HR PATCH WEEKLY; Place 1 Patch on the skin every 7 days for 28 days.    Low back pain, unspecified back pain laterality, unspecified chronicity, unspecified whether sciatica present  -     Pain Management Screen  -     Discontinue: Buprenorphine 10 MCG/HR PATCH WEEKLY; Place 1 Patch on the skin every 7 days for 28 days.  -     Consent for Opiate Prescription  -     Controlled Substance Treatment Agreement  -     Buprenorphine 10 MCG/HR PATCH WEEKLY; Place 1 Patch on the skin every 7 days for 28 days.  -     Buprenorphine 10 MCG/HR PATCH WEEKLY; Place 1 Patch on the skin every 7 days for 28 days.    Osteoporosis, unspecified osteoporosis type, unspecified pathological fracture presence    Tobacco use       1. Discussed checking pain management screen.  2.  reviewed.  Continue buprenorphine 10mcg/hr.  Scripts given for the next three months.  3. Continue calcium and vitamin D in divided doses twice a day for osteoporosis and vitamin D deficiency.   She is not sure who she saw for her osteoporosis.  She has taken fosamax in the past and did not restart.  They will see if they can determine who she saw.  4. Continue to monitor skin for irritation related to the KAFO.  No new issues today.  5. Encouraged smoking cessation. She is trying to reduce, but is not ready to quit at this time.  Discussion was less than 3 minutes.  6. Encouraged follow-up with PCP regarding referral to Dermatology       Follow-up: Follow-up in 12 weeks    Please note that this dictation was created using voice recognition software. I have made every reasonable attempt to  correct obvious errors but there may be errors of grammar and content that I may have overlooked prior to finalization of this note.      Neftali Painter MD  Physical Medicine and Rehabilitation  Interventional Spine and Sports Physiatry  [facility]

## 2021-03-03 DIAGNOSIS — Z23 NEED FOR VACCINATION: ICD-10-CM

## 2021-03-26 ENCOUNTER — OFFICE VISIT (OUTPATIENT)
Dept: PHYSICAL MEDICINE AND REHAB | Facility: MEDICAL CENTER | Age: 71
End: 2021-03-26
Payer: MEDICARE

## 2021-03-26 VITALS
BODY MASS INDEX: 17.16 KG/M2 | HEART RATE: 99 BPM | SYSTOLIC BLOOD PRESSURE: 110 MMHG | DIASTOLIC BLOOD PRESSURE: 60 MMHG | WEIGHT: 85.1 LBS | OXYGEN SATURATION: 93 % | HEIGHT: 59 IN | TEMPERATURE: 98.1 F

## 2021-03-26 DIAGNOSIS — G14 POST-POLIO SYNDROME: ICD-10-CM

## 2021-03-26 DIAGNOSIS — M41.25 OTHER IDIOPATHIC SCOLIOSIS, THORACOLUMBAR REGION: ICD-10-CM

## 2021-03-26 DIAGNOSIS — F11.90 CHRONIC, CONTINUOUS USE OF OPIOIDS: ICD-10-CM

## 2021-03-26 DIAGNOSIS — M47.816 LUMBAR SPONDYLOSIS: ICD-10-CM

## 2021-03-26 DIAGNOSIS — M54.50 LOW BACK PAIN, UNSPECIFIED BACK PAIN LATERALITY, UNSPECIFIED CHRONICITY, UNSPECIFIED WHETHER SCIATICA PRESENT: ICD-10-CM

## 2021-03-26 PROCEDURE — 99214 OFFICE O/P EST MOD 30 MIN: CPT | Performed by: PHYSICAL MEDICINE & REHABILITATION

## 2021-03-26 RX ORDER — BUPRENORPHINE 15 UG/H
1 PATCH TRANSDERMAL
Qty: 4 PATCH | Refills: 0 | Status: SHIPPED | OUTPATIENT
Start: 2021-03-30 | End: 2021-03-31 | Stop reason: SDUPTHER

## 2021-03-26 RX ORDER — MIRTAZAPINE 30 MG/1
1 TABLET, FILM COATED ORAL
COMMUNITY
Start: 2021-03-23

## 2021-03-26 ASSESSMENT — FIBROSIS 4 INDEX: FIB4 SCORE: 0.85

## 2021-03-26 ASSESSMENT — PATIENT HEALTH QUESTIONNAIRE - PHQ9: CLINICAL INTERPRETATION OF PHQ2 SCORE: 0

## 2021-03-26 ASSESSMENT — PAIN SCALES - GENERAL: PAINLEVEL: 6=MODERATE PAIN

## 2021-03-26 NOTE — PROGRESS NOTES
"Follow-up patient note    Physiatry (physical medicine and  Rehabilitation), interventional spine and sports medicine    Date of Service:03/26/2021    Chief complaint: Chronic pain    HISTORY    HPI: Jewell Skelton 70 y.o. female who presents today, accompanied by her daughter, for follow-up of pain related to her scoliosis, low back and history of post-polio.      Since the last visit, Jewell reports that she has been having less pain relief from her butrans patch, pain is worsening, we have discussed this before.  She uses her axillary crutches and KAFO.  Her low back has been more painful and she has not been very active. Mobility and ADLs are more difficult.    Pain is a 6/10 on the NRS. Bowel movements are regular.  No skin lesions related.  No falls since last visit.     She had been doing better with smoking, but has returned to baseline.  This is still around 15/day.    ORT: 15   reviewed today.    PHQ9 -0    Medical records review:  Previous treatments:    Physical Therapy: Yes, in the past    Medications the patient is tried: Narcotics, tylenol, muscle relaxants      Previous interventions: yes    Previous surgeries to relieve the above pain:  none      ROS:   Eyes: Wears glasses  ENT: history of post-nasal drip  Resp:COPD  GI:h/o ulcers, acid reflux, Hep C  Skin: skin cancer  Heme: history of bleeding    Red Flags ROS:   Fever, Chills, Sweats: Denies  Involuntary Weight Loss: Denies  Bladder Incontinence: Denies  Bowel Incontinence: denies  Saddle Anesthesia: Denies    All other systems reviewed and negative.       PMHx:   Past Medical History:   Diagnosis Date   • Abnormal radiologic finding of lung field 9/8/2017    History of bilateral pulmonary nodules   • Anesthesia     patient's mom had side effects   • Arthritis 10/31/2018    \"everywhere\"   • Asthma    • Back pain    • Breath shortness    • Cancer (HCC) 2013    squamous cell   • Chickenpox    • COPD (chronic obstructive pulmonary disease) ([facility]) " "9/8/2017   • Dental disorder 10/31/2018    upper   • Emphysema of lung (HCC)    • Fatigue    • Mongolian measles    • Heart burn    • Hepatitis C     dx 10/2018   • Hypertension 10/31/2018   • Influenza    • Mumps    • Polio    • Seizure (HCC)     \"possibly 1 time in 2000\"   • Wears glasses        PSHx:   Past Surgical History:   Procedure Laterality Date   • FINGER ARTHROPLASTY Right 11/2/2018    Procedure: FINGER ARTHROPLASTY - THUMB CARPOMETACARPAL FUSION VERSUS THUMB METACARPOPHALANGEAL FUSION, THUMB CARPOMETACARPAL EXCISIONAL ARTHROPLASTY, FLEXOR CARPI RADIALIS GRAFT;  Surgeon: Jerzy Ritchie M.D.;  Location: SURGERY SAME DAY [facility];  Service: Orthopedics   • CHOLECYSTECTOMY     • GASTROSTOMY     • OTHER ORTHOPEDIC SURGERY      Left ankle fusion, right foot surgery   • PRIMARY C SECTION      hysterectomy 1995   • TONSILLECTOMY         Family history   Family History   Problem Relation Age of Onset   • Diabetes Mother    • Lung Cancer Father          Medications:   Outpatient Medications Marked as Taking for the 3/26/21 encounter (Office Visit) with Neftali Painter M.D.   Medication Sig Dispense Refill   • [START ON 3/30/2021] Buprenorphine 15 MCG/HR PATCH WEEKLY Place 1 Patch on the skin every 7 days for 28 days. 4 Patch 0   • fluticasone (FLONASE) 50 MCG/ACT nasal spray ONE TO TWO SPRAYS IN EACH NOSTRIL DAILY AS NEEDED FOR CONGESTION     • famotidine (PEPCID) 20 MG Tab      • valacyclovir (VALTREX) 1 GM Tab      • polyethylene glycol 3350 (MIRALAX) Powder Take 17 g by mouth 1 time daily as needed. 1 Bottle 2   • loratadine (CLARITIN) 10 MG Tab Take 10 mg by mouth every day.     • mupirocin (BACTROBAN) 2 % Ointment   1   • amLODIPine (NORVASC) 2.5 MG Tab 7.5 mg 1 time daily as needed. Takes 5mg and 2.5mg for total of 7.5mg  3   • BREO ELLIPTA 100-25 MCG/INH AEROSOL POWDER, BREATH ACTIVATED INHALE 1 PUFF BY MOUTH EVERY DAY. RINSE MOUTH AFTER USE. 1 Each 5   • albuterol 108 (90 Base) MCG/ACT Aero Soln " "inhalation aerosol Inhale 2 Puffs by mouth every 6 hours as needed for Shortness of Breath.     • acetaminophen (TYLENOL) 325 MG Tab Take 650 mg by mouth every four hours as needed.     • tiotropium (SPIRIVA HANDIHALER) 18 MCG Cap Inhale 18 mcg by mouth every day.     • Fluticasone Furoate-Vilanterol (BREO ELLIPTA) 100-25 MCG/INH AEROSOL POWDER, BREATH ACTIVATED Inhale 1 Puff by mouth every day. Rinse mouth after use. 1 Each 5   • busPIRone (BUSPAR) 30 MG tablet TAKE ONE TABLET BY MOUTH TWICE PER DAY.  2   • Calcium Carb-Cholecalciferol (CALCIUM 600 + D PO) Take 1 tablet by mouth every day.     • amlodipine (NORVASC) 5 MG Tab Take 5 mg by mouth every day.           Allergies:   Allergies   Allergen Reactions   • Tape Unspecified     Rips off epidermis   • Ambien [Zolpidem] Unspecified     Memory loss   • Augmentin Nausea   • Bloodless      protocal per patient request; MD notified       Social Hx:   Social History     Socioeconomic History   • Marital status:      Spouse name: Not on file   • Number of children: Not on file   • Years of education: Not on file   • Highest education level: Not on file   Occupational History   • Not on file   Tobacco Use   • Smoking status: Current Every Day Smoker     Packs/day: 0.50     Years: 47.00     Pack years: 23.50     Types: Cigarettes   • Smokeless tobacco: Never Used   • Tobacco comment: \"working on cutting down as a family\"   Substance and Sexual Activity   • Alcohol use: No   • Drug use: No   • Sexual activity: Not on file   Other Topics Concern   •  Service No   • Blood Transfusions No   • Caffeine Concern No   • Occupational Exposure No   • Hobby Hazards No   • Sleep Concern No   • Stress Concern Yes   • Weight Concern No   • Special Diet No   • Back Care No   • Exercise Yes   • Bike Helmet No   • Seat Belt Yes   • Self-Exams Yes   Social History Narrative   • Not on file     Social Determinants of Health     Financial Resource Strain:    • Difficulty of " "Paying Living Expenses:    Food Insecurity:    • Worried About Running Out of Food in the Last Year:    • Ran Out of Food in the Last Year:    Transportation Needs:    • Lack of Transportation (Medical):    • Lack of Transportation (Non-Medical):    Physical Activity:    • Days of Exercise per Week:    • Minutes of Exercise per Session:    Stress:    • Feeling of Stress :    Social Connections:    • Frequency of Communication with Friends and Family:    • Frequency of Social Gatherings with Friends and Family:    • Attends [employer] Services:    • Active Member of Clubs or Organizations:    • Attends Club or Organization Meetings:    • Marital Status:    Intimate Partner Violence:    • Fear of Current or Ex-Partner:    • Emotionally Abused:    • Physically Abused:    • Sexually Abused:          EXAMINATION     Physical Exam:   Vitals: /60 (BP Location: Right arm, Patient Position: Sitting, BP Cuff Size: Small adult)   Pulse 99   Temp 36.7 °C (98.1 °F) (Temporal)   Ht 1.499 m (4' 11\")   Wt 38.6 kg (85 lb 1.6 oz)   SpO2 93%     Constitutional:   Body Habitus: Body mass index is 17.19 kg/m².  Cooperation: Fully cooperates with exam  Appearance: Well-groomed, very thin, not disheveled, in no acute distress    Eyes: No scleral icterus, no proptosis     ENT -no obvious auditory deficits, wearing a face mask    Skin -no rashes or lesions noted,     Respiratory-  breathing comfortable on room air, no audible wheezing    Cardiovascular- No lower extremity edema in the legs    Psychiatric- alert and oriented ×3. Normal affect.     Gait - Abnormal gait, left KAFO and axillary crutches.     Lumbar spine: tenderness over the thoracolumbar paraspinals left and painful over the PSIS bilaterally, right lumbar paraspinals.        MEDICAL DECISION MAKING    Medical records review: see under HPI section.     DATA  UDS 02/19/2019 Negative for substances, including buprenorphine.  This may test negative with use of butrans " patch.  UDS 07/18/2019: Negative for buprenorphine.  Positive for kratom.  UDS 09/05/2019 Negative for buprenorphine.  Positive for low doses of kratom  UDS: 10/29/2019: Buprenorphine is negative, which can be the case with the low doses for butrans patch.  Positive for trace amounts of marijuana and kratom  UDS 12/20/2019 Negative for buprenorphine.  Positive for low dose of kratom and THC   UDS 02/20/2020: Buprenorphine is negative, not unexpected for butrans patch, negative for other substances  UDS 01/12/2021: Buprenorphine is negative, not unexpected for butrans patch, positive for kratom    Labs:   Lab Results   Component Value Date/Time    SODIUM 130 (L) 07/30/2020 10:06 AM    POTASSIUM 4.5 07/30/2020 10:06 AM    CHLORIDE 96 07/30/2020 10:06 AM    CO2 23 07/30/2020 10:06 AM    ANION 11.0 07/30/2020 10:06 AM    GLUCOSE 82 07/30/2020 10:06 AM    BUN 6 (L) 07/30/2020 10:06 AM    CREATININE 0.31 (L) 07/30/2020 10:06 AM    CALCIUM 8.9 07/30/2020 10:06 AM    ASTSGOT 19 07/30/2020 10:06 AM    ALTSGPT 8 07/30/2020 10:06 AM    TBILIRUBIN 0.2 07/30/2020 10:06 AM    ALBUMIN 3.9 07/30/2020 10:06 AM    TOTPROTEIN 7.4 07/30/2020 10:06 AM    GLOBULIN 3.5 07/30/2020 10:06 AM    AGRATIO 1.1 07/30/2020 10:06 AM          Lab Results   Component Value Date/Time    WBC 14.2 (H) 07/30/2020 10:06 AM    RBC 4.27 07/30/2020 10:06 AM    HEMOGLOBIN 10.9 (L) 07/30/2020 10:06 AM    HEMATOCRIT 35.8 (L) 07/30/2020 10:06 AM    MCV 83.8 07/30/2020 10:06 AM    MCH 25.5 (L) 07/30/2020 10:06 AM    MCHC 30.4 (L) 07/30/2020 10:06 AM    MPV 9.1 07/30/2020 10:06 AM    NEUTSPOLYS 69.30 07/30/2020 10:06 AM    LYMPHOCYTES 22.30 07/30/2020 10:06 AM    MONOCYTES 6.00 07/30/2020 10:06 AM    EOSINOPHILS 1.10 07/30/2020 10:06 AM    BASOPHILS 0.80 07/30/2020 10:06 AM           Imaging:  MRI lumbar spine: Reviewed, this is my interpretation   12/17/2018  There is note of multilevel disc bulges without central canal stenosis.  There is lumbar facet  hypertrophy noted in the lumbar facets bilaterally.  Foraminal canal stenosis is moderate right L2-3, mild L4-5 bilaterally.    MRI lumbar spine report reviewed  12/17/2018 Multilevel degenerative changes of the lumbar spine as described                                                                                                                                                                    DEXA: 04/10/2019  According to the World Health Organization classification, bone mineral density of this patient is osteoporosis with high risk of fracture in the femoral region and normal in the lumbar region.    10-year Probability of Fracture:  Major Osteoporotic     21.1%  Hip     6.6%  Population      [place] ()    Based on left femur neck BMD      Diagnosis   Visit Diagnoses     ICD-10-CM   1. Other idiopathic scoliosis, thoracolumbar region  M41.25   2. 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 Chronic, continuous use of opioids  F11.90   4. Lumbar spondylosis  M47.816   5. Low back pain, unspecified back pain laterality, unspecified chronicity, unspecified whether sciatica present  M54.5       ASSESSMENT:  Jewell Skelton 70 y.o. female with history of polio, scoliosis, low back pain with radiation     Jewell was seen today for follow-up.    Diagnoses and all orders for this visit:    Other idiopathic scoliosis, thoracolumbar region  -     Pain Management Screen  -     Buprenorphine 15 MCG/HR PATCH WEEKLY; Place 1 Patch on the skin every 7 days for 28 days.  -     Consent for Opiate Prescription  -     Controlled Substance Treatment Agreement    Post-polio syndrome  -     Pain Management Screen  -     Buprenorphine 15 MCG/HR PATCH WEEKLY; Place 1 Patch on the skin every 7 days for 28 days.  -     Consent for Opiate Prescription  -     Controlled Substance Treatment Agreement    Chronic, continuous use of opioids  -     Pain Management Screen    Lumbar spondylosis  -     Buprenorphine 15 MCG/HR PATCH WEEKLY; Place 1 Patch on  the skin every 7 days for 28 days.  -     Consent for Opiate Prescription  -     Controlled Substance Treatment Agreement    Low back pain, unspecified back pain laterality, unspecified chronicity, unspecified whether sciatica present  -     Buprenorphine 15 MCG/HR PATCH WEEKLY; Place 1 Patch on the skin every 7 days for 28 days.  -     Consent for Opiate Prescription  -     Controlled Substance Treatment Agreement         1. Discussed her worsening pain that is increasingly limiting her mobility and ADLs.  This has been happening gradually and we have discussed in the past.  Trial increase in butrans patch to 15mcg/hr.    2. Reviewed  and most recent UDS.  Both patient and daughter report that they have stopped using kratom.  She has used an old pill bottle that previously held kratom.  Discussed that she will stop using this.  Plan to retest and monitor closely.  3. Encouraged smoking cessation.   4. She has had follow-up with Dermatology         Follow-up: 4 weeks    Please note that this dictation was created using voice recognition software. I have made every reasonable attempt to correct obvious errors but there may be errors of grammar and content that I may have overlooked prior to finalization of this note.      Neftali Painter MD  Physical Medicine and Rehabilitation  Interventional Spine and Sports Physiatry  [facility]

## 2021-03-31 DIAGNOSIS — M54.50 LOW BACK PAIN, UNSPECIFIED BACK PAIN LATERALITY, UNSPECIFIED CHRONICITY, UNSPECIFIED WHETHER SCIATICA PRESENT: ICD-10-CM

## 2021-03-31 DIAGNOSIS — M41.25 OTHER IDIOPATHIC SCOLIOSIS, THORACOLUMBAR REGION: ICD-10-CM

## 2021-03-31 DIAGNOSIS — M47.816 LUMBAR SPONDYLOSIS: ICD-10-CM

## 2021-03-31 DIAGNOSIS — G14 POST-POLIO SYNDROME: ICD-10-CM

## 2021-03-31 RX ORDER — BUPRENORPHINE 15 UG/H
1 PATCH TRANSDERMAL
Qty: 4 PATCH | Refills: 0 | Status: SHIPPED | OUTPATIENT
Start: 2021-03-31 | End: 2021-04-22 | Stop reason: SDUPTHER

## 2021-04-01 ENCOUNTER — TELEPHONE (OUTPATIENT)
Dept: PHYSICAL MEDICINE AND REHAB | Facility: MEDICAL CENTER | Age: 71
End: 2021-04-01

## 2021-04-02 ENCOUNTER — TELEPHONE (OUTPATIENT)
Dept: PHYSICAL MEDICINE AND REHAB | Facility: MEDICAL CENTER | Age: 71
End: 2021-04-02

## 2021-04-22 ENCOUNTER — OFFICE VISIT (OUTPATIENT)
Dept: PHYSICAL MEDICINE AND REHAB | Facility: MEDICAL CENTER | Age: 71
End: 2021-04-22
Payer: MEDICARE

## 2021-04-22 VITALS
BODY MASS INDEX: 18.33 KG/M2 | WEIGHT: 87.3 LBS | TEMPERATURE: 98.2 F | OXYGEN SATURATION: 92 % | DIASTOLIC BLOOD PRESSURE: 60 MMHG | HEART RATE: 100 BPM | HEIGHT: 58 IN | SYSTOLIC BLOOD PRESSURE: 128 MMHG

## 2021-04-22 DIAGNOSIS — M54.50 LOW BACK PAIN, UNSPECIFIED BACK PAIN LATERALITY, UNSPECIFIED CHRONICITY, UNSPECIFIED WHETHER SCIATICA PRESENT: ICD-10-CM

## 2021-04-22 DIAGNOSIS — Z71.6 TOBACCO ABUSE COUNSELING: ICD-10-CM

## 2021-04-22 DIAGNOSIS — Z79.899 MEDICATION MANAGEMENT: ICD-10-CM

## 2021-04-22 DIAGNOSIS — G14 POST-POLIO SYNDROME: ICD-10-CM

## 2021-04-22 DIAGNOSIS — M47.816 LUMBAR SPONDYLOSIS: ICD-10-CM

## 2021-04-22 DIAGNOSIS — M41.25 OTHER IDIOPATHIC SCOLIOSIS, THORACOLUMBAR REGION: ICD-10-CM

## 2021-04-22 DIAGNOSIS — Z72.0 TOBACCO USE: ICD-10-CM

## 2021-04-22 PROCEDURE — 99406 BEHAV CHNG SMOKING 3-10 MIN: CPT | Performed by: PHYSICAL MEDICINE & REHABILITATION

## 2021-04-22 PROCEDURE — 99214 OFFICE O/P EST MOD 30 MIN: CPT | Mod: 25 | Performed by: PHYSICAL MEDICINE & REHABILITATION

## 2021-04-22 RX ORDER — BUPRENORPHINE 15 UG/H
1 PATCH TRANSDERMAL
Qty: 4 PATCH | Refills: 0 | Status: SHIPPED | OUTPATIENT
Start: 2021-04-28 | End: 2021-05-11 | Stop reason: SDUPTHER

## 2021-04-22 ASSESSMENT — FIBROSIS 4 INDEX: FIB4 SCORE: 0.85

## 2021-04-22 ASSESSMENT — PATIENT HEALTH QUESTIONNAIRE - PHQ9: CLINICAL INTERPRETATION OF PHQ2 SCORE: 0

## 2021-04-22 ASSESSMENT — PAIN SCALES - GENERAL: PAINLEVEL: 4=SLIGHT-MODERATE PAIN

## 2021-04-22 NOTE — PROGRESS NOTES
Follow-up patient note    Physiatry (physical medicine and  Rehabilitation), interventional spine and sports medicine    Date of Service: 04/22/2021    Chief complaint: Chronic pain    HISTORY    HPI: Jewell Skelton 70 y.o. female who presents today, accompanied by her daughter, for follow-up of pain related to her scoliosis, low back and history of post-polio.      Jewell returns for follow-up.  She finds that the butrans patch at 15mcg/hr has been more helpful.  Her pain is better than before.  She takes about 3000 mg of tylenol     She uses her axillary crutches and KAFO.  Her low back has been more painful and she has not been very active. Mobility and ADLs are easier with change in medications.  No falls since last visit.    Pain is a 4/10 on the NRS. Bowel movements are regular.      She had been doing better with smoking, but has returned to baseline.  This is still around 15/day.  This has been difficult for her.  She is wanting to stop smoking.  Her daughter is here and is also looking to quit smoking.  The patient has tired chantix in the past, but this did not work.    They had thrown out all of the tylenol that was in a kratom bottle with broken kratom in it.    ORT: 15   reviewed today.    PHQ9 -0    Medical records review:  Previous treatments:    Physical Therapy: Yes, in the past    Medications the patient is tried: Narcotics, tylenol, muscle relaxants      Previous interventions: yes    Previous surgeries to relieve the above pain:  none      ROS:   Eyes: Wears glasses  ENT: history of post-nasal drip  Resp:COPD  GI:h/o ulcers, acid reflux, Hep C  Skin: skin cancer  Heme: history of bleeding    Red Flags ROS:   Fever, Chills, Sweats: Denies  Involuntary Weight Loss: Denies  Bladder Incontinence: Denies  Bowel Incontinence: denies  Saddle Anesthesia: Denies    All other systems reviewed and negative.       PMHx:   Past Medical History:   Diagnosis Date   • Abnormal radiologic finding of lung field  "9/8/2017    History of bilateral pulmonary nodules   • Anesthesia     patient's mom had side effects   • Arthritis 10/31/2018    \"everywhere\"   • Asthma    • Back pain    • Breath shortness    • Cancer (HCC) 2013    squamous cell   • Chickenpox    • COPD (chronic obstructive pulmonary disease) (HCC) 9/8/2017   • Dental disorder 10/31/2018    upper   • Emphysema of lung (HCC)    • Fatigue    • [place] measles    • Heart burn    • Hepatitis C     dx 10/2018   • Hypertension 10/31/2018   • Influenza    • Mumps    • Polio    • Seizure (HCC)     \"possibly 1 time in 2000\"   • Wears glasses        PSHx:   Past Surgical History:   Procedure Laterality Date   • FINGER ARTHROPLASTY Right 11/2/2018    Procedure: FINGER ARTHROPLASTY - THUMB CARPOMETACARPAL FUSION VERSUS THUMB METACARPOPHALANGEAL FUSION, THUMB CARPOMETACARPAL EXCISIONAL ARTHROPLASTY, FLEXOR CARPI RADIALIS GRAFT;  Surgeon: Jerzy Ritchie M.D.;  Location: SURGERY SAME DAY [facility];  Service: Orthopedics   • CHOLECYSTECTOMY     • GASTROSTOMY     • OTHER ORTHOPEDIC SURGERY      Left ankle fusion, right foot surgery   • PRIMARY C SECTION      hysterectomy 1995   • TONSILLECTOMY         Family history   Family History   Problem Relation Age of Onset   • Diabetes Mother    • Lung Cancer Father          Medications:   Outpatient Medications Marked as Taking for the 4/22/21 encounter (Office Visit) with Neftali Painter M.D.   Medication Sig Dispense Refill   • [START ON 4/28/2021] Buprenorphine 15 MCG/HR PATCH WEEKLY Place 1 Patch on the skin every 7 days for 28 days. 4 Patch 0   • mirtazapine (REMERON) 30 MG Tab tablet Take 1 tablet by mouth every bedtime.     • fluticasone (FLONASE) 50 MCG/ACT nasal spray ONE TO TWO SPRAYS IN EACH NOSTRIL DAILY AS NEEDED FOR CONGESTION     • famotidine (PEPCID) 20 MG Tab      • valacyclovir (VALTREX) 1 GM Tab      • polyethylene glycol 3350 (MIRALAX) Powder Take 17 g by mouth 1 time daily as needed. 1 Bottle 2   • loratadine " "(CLARITIN) 10 MG Tab Take 10 mg by mouth every day.     • mupirocin (BACTROBAN) 2 % Ointment   1   • amLODIPine (NORVASC) 2.5 MG Tab 7.5 mg 1 time daily as needed. Takes 5mg and 2.5mg for total of 7.5mg  3   • BREO ELLIPTA 100-25 MCG/INH AEROSOL POWDER, BREATH ACTIVATED INHALE 1 PUFF BY MOUTH EVERY DAY. RINSE MOUTH AFTER USE. 1 Each 5   • albuterol 108 (90 Base) MCG/ACT Aero Soln inhalation aerosol Inhale 2 Puffs by mouth every 6 hours as needed for Shortness of Breath.     • acetaminophen (TYLENOL) 325 MG Tab Take 650 mg by mouth every four hours as needed.     • tiotropium (SPIRIVA HANDIHALER) 18 MCG Cap Inhale 18 mcg by mouth every day.     • Fluticasone Furoate-Vilanterol (BREO ELLIPTA) 100-25 MCG/INH AEROSOL POWDER, BREATH ACTIVATED Inhale 1 Puff by mouth every day. Rinse mouth after use. 1 Each 5   • busPIRone (BUSPAR) 30 MG tablet TAKE ONE TABLET BY MOUTH TWICE PER DAY.  2   • Calcium Carb-Cholecalciferol (CALCIUM 600 + D PO) Take 1 tablet by mouth every day.     • amlodipine (NORVASC) 5 MG Tab Take 5 mg by mouth every day.           Allergies:   Allergies   Allergen Reactions   • Tape Unspecified     Rips off epidermis   • Ambien [Zolpidem] Unspecified     Memory loss   • Augmentin Nausea   • Bloodless      protocal per patient request; MD notified       Social Hx:   Social History     Socioeconomic History   • Marital status:      Spouse name: Not on file   • Number of children: Not on file   • Years of education: Not on file   • Highest education level: Not on file   Occupational History   • Not on file   Tobacco Use   • Smoking status: Current Every Day Smoker     Packs/day: 0.50     Years: 47.00     Pack years: 23.50     Types: Cigarettes   • Smokeless tobacco: Never Used   • Tobacco comment: \"working on cutting down as a family\"   Substance and Sexual Activity   • Alcohol use: No   • Drug use: No   • Sexual activity: Not on file   Other Topics Concern   •  Service No   • Blood " "Transfusions No   • Caffeine Concern No   • Occupational Exposure No   • Hobby Hazards No   • Sleep Concern No   • Stress Concern Yes   • Weight Concern No   • Special Diet No   • Back Care No   • Exercise Yes   • Bike Helmet No   • Seat Belt Yes   • Self-Exams Yes   Social History Narrative   • Not on file     Social Determinants of Health     Financial Resource Strain:    • Difficulty of Paying Living Expenses:    Food Insecurity:    • Worried About Running Out of Food in the Last Year:    • Ran Out of Food in the Last Year:    Transportation Needs:    • Lack of Transportation (Medical):    • Lack of Transportation (Non-Medical):    Physical Activity:    • Days of Exercise per Week:    • Minutes of Exercise per Session:    Stress:    • Feeling of Stress :    Social Connections:    • Frequency of Communication with Friends and Family:    • Frequency of Social Gatherings with Friends and Family:    • Attends Yazidi Services:    • Active Member of Clubs or Organizations:    • Attends Club or Organization Meetings:    • Marital Status:    Intimate Partner Violence:    • Fear of Current or Ex-Partner:    • Emotionally Abused:    • Physically Abused:    • Sexually Abused:          EXAMINATION     Physical Exam:   Vitals: /60 (BP Location: Right arm, Patient Position: Sitting, BP Cuff Size: Small adult)   Pulse 100   Temp 36.8 °C (98.2 °F) (Temporal)   Ht 1.473 m (4' 10\")   Wt 39.6 kg (87 lb 4.8 oz)   SpO2 92%     Constitutional:   Body Habitus: Body mass index is 18.25 kg/m².  Cooperation: Fully cooperates with exam  Appearance: Well-groomed, very thin, not disheveled, in no acute distress    Eyes: No scleral icterus, no proptosis     ENT -no obvious auditory deficits, wearing a face mask    Skin -no rashes or lesions noted,     Respiratory-  breathing comfortable on room air, no audible wheezing    Cardiovascular- No lower extremity edema in the legs    Psychiatric- alert and oriented ×3. Normal affect. "     Gait - Abnormal gait, left KAFO and axillary crutches.         MEDICAL DECISION MAKING    Medical records review: see under HPI section.     DATA  UDS 02/19/2019 Negative for substances, including buprenorphine.  This may test negative with use of butrans patch.  UDS 07/18/2019: Negative for buprenorphine.  Positive for kratom.  UDS 09/05/2019 Negative for buprenorphine.  Positive for low doses of kratom  UDS: 10/29/2019: Buprenorphine is negative, which can be the case with the low doses for butrans patch.  Positive for trace amounts of marijuana and kratom  UDS 12/20/2019 Negative for buprenorphine.  Positive for low dose of kratom and THC   UDS 02/20/2020: Buprenorphine is negative, not unexpected for butrans patch, negative for other substances  UDS 01/12/2021: Buprenorphine is negative, not unexpected for butrans patch, positive for kratom  UDS 03/26/2021: Buprenorphine is negative, not unexpected for butrans patch, positive for kratom    Labs:   Lab Results   Component Value Date/Time    SODIUM 130 (L) 07/30/2020 10:06 AM    POTASSIUM 4.5 07/30/2020 10:06 AM    CHLORIDE 96 07/30/2020 10:06 AM    CO2 23 07/30/2020 10:06 AM    ANION 11.0 07/30/2020 10:06 AM    GLUCOSE 82 07/30/2020 10:06 AM    BUN 6 (L) 07/30/2020 10:06 AM    CREATININE 0.31 (L) 07/30/2020 10:06 AM    CALCIUM 8.9 07/30/2020 10:06 AM    ASTSGOT 19 07/30/2020 10:06 AM    ALTSGPT 8 07/30/2020 10:06 AM    TBILIRUBIN 0.2 07/30/2020 10:06 AM    ALBUMIN 3.9 07/30/2020 10:06 AM    TOTPROTEIN 7.4 07/30/2020 10:06 AM    GLOBULIN 3.5 07/30/2020 10:06 AM    AGRATIO 1.1 07/30/2020 10:06 AM          Lab Results   Component Value Date/Time    WBC 14.2 (H) 07/30/2020 10:06 AM    RBC 4.27 07/30/2020 10:06 AM    HEMOGLOBIN 10.9 (L) 07/30/2020 10:06 AM    HEMATOCRIT 35.8 (L) 07/30/2020 10:06 AM    MCV 83.8 07/30/2020 10:06 AM    MCH 25.5 (L) 07/30/2020 10:06 AM    MCHC 30.4 (L) 07/30/2020 10:06 AM    MPV 9.1 07/30/2020 10:06 AM    NEUTSPOLYS 69.30 07/30/2020  10:06 AM    LYMPHOCYTES 22.30 07/30/2020 10:06 AM    MONOCYTES 6.00 07/30/2020 10:06 AM    EOSINOPHILS 1.10 07/30/2020 10:06 AM    BASOPHILS 0.80 07/30/2020 10:06 AM           Imaging:  MRI lumbar spine: Reviewed, this is my interpretation   12/17/2018  There is note of multilevel disc bulges without central canal stenosis.  There is lumbar facet hypertrophy noted in the lumbar facets bilaterally.  Foraminal canal stenosis is moderate right L2-3, mild L4-5 bilaterally.    MRI lumbar spine report reviewed  12/17/2018 Multilevel degenerative changes of the lumbar spine as described                                                                                                                                                                    DEXA: 04/10/2019  According to the World Health Organization classification, bone mineral density of this patient is osteoporosis with high risk of fracture in the femoral region and normal in the lumbar region.    10-year Probability of Fracture:  Major Osteoporotic     21.1%  Hip     6.6%  Population      [place] ()    Based on left femur neck BMD      Diagnosis   Visit Diagnoses     ICD-10-CM   1. Other idiopathic scoliosis, thoracolumbar region  M41.25   2. 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 Lumbar spondylosis  M47.816   4. Low back pain, unspecified back pain laterality, unspecified chronicity, unspecified whether sciatica present  M54.5   5. Medication management  Z79.899   6. Tobacco use  Z72.0   7. Tobacco abuse counseling  Z71.6       ASSESSMENT:  Jewell Skelton 70 y.o. female with history of polio, scoliosis, low back pain with radiation     Jewell was seen today for follow-up.    Diagnoses and all orders for this visit:    Other idiopathic scoliosis, thoracolumbar region  -     Pain Management Screen  -     Buprenorphine 15 MCG/HR PATCH WEEKLY; Place 1 Patch on the skin every 7 days for 28 days.  -     Consent for Opiate Prescription  -     Controlled Substance Treatment  Agreement    Post-polio syndrome  -     Pain Management Screen  -     Buprenorphine 15 MCG/HR PATCH WEEKLY; Place 1 Patch on the skin every 7 days for 28 days.  -     Consent for Opiate Prescription  -     Controlled Substance Treatment Agreement    Lumbar spondylosis  -     Pain Management Screen  -     Buprenorphine 15 MCG/HR PATCH WEEKLY; Place 1 Patch on the skin every 7 days for 28 days.  -     Consent for Opiate Prescription  -     Controlled Substance Treatment Agreement    Low back pain, unspecified back pain laterality, unspecified chronicity, unspecified whether sciatica present  -     Pain Management Screen  -     Buprenorphine 15 MCG/HR PATCH WEEKLY; Place 1 Patch on the skin every 7 days for 28 days.  -     Consent for Opiate Prescription  -     Controlled Substance Treatment Agreement    Medication management  -     Comp Metabolic Panel; Future  -     CBC WITH DIFFERENTIAL; Future    Tobacco use    Tobacco abuse counseling       1. Discussed continuing with butrans patch.  Continue at current dose.    2. Discussed rechecking CBC and CMP  3. We discussed concerns about findings on UDS.  From what she and her daughter report, they have completely eliminated all containers that she used to hold her tylenol.  My expectation is that this UDS will be as expected, without kratom.  Will refill script for next month if UDS is as expected.  4.  reviewed. Consents obtained  5. Discussed smoking cessation with patient and daugther.  Time spent in counseling was 5 minutes.  We discussed evaluating her readiness to quit and assessing her reasons for smoking and reasons that she would like to quit.  Discussed things that she has tried and failed in the past as well as the fact that, including her, there are three smokers in the home and how they might support making this change.           Follow-up: 8 weeks    Please note that this dictation was created using voice recognition software. I have made every  reasonable attempt to correct obvious errors but there may be errors of grammar and content that I may have overlooked prior to finalization of this note.      Neftali Painter MD  Physical Medicine and Rehabilitation  Interventional Spine and Sports Physiatry  [facility]

## 2021-05-11 DIAGNOSIS — M41.25 OTHER IDIOPATHIC SCOLIOSIS, THORACOLUMBAR REGION: ICD-10-CM

## 2021-05-11 DIAGNOSIS — M54.50 LOW BACK PAIN, UNSPECIFIED BACK PAIN LATERALITY, UNSPECIFIED CHRONICITY, UNSPECIFIED WHETHER SCIATICA PRESENT: ICD-10-CM

## 2021-05-11 DIAGNOSIS — G14 POST-POLIO SYNDROME: ICD-10-CM

## 2021-05-11 DIAGNOSIS — M47.816 LUMBAR SPONDYLOSIS: ICD-10-CM

## 2021-05-11 RX ORDER — BUPRENORPHINE 15 UG/H
1 PATCH TRANSDERMAL
Qty: 4 PATCH | Refills: 0 | Status: SHIPPED | OUTPATIENT
Start: 2021-05-23 | End: 2021-06-20

## 2021-06-22 ENCOUNTER — HOSPITAL ENCOUNTER (OUTPATIENT)
Dept: LAB | Facility: MEDICAL CENTER | Age: 71
End: 2021-06-22
Attending: PHYSICAL MEDICINE & REHABILITATION
Payer: MEDICARE

## 2021-06-22 DIAGNOSIS — Z79.899 MEDICATION MANAGEMENT: ICD-10-CM

## 2021-06-22 PROCEDURE — 80053 COMPREHEN METABOLIC PANEL: CPT

## 2021-06-22 PROCEDURE — 85025 COMPLETE CBC W/AUTO DIFF WBC: CPT

## 2021-06-22 PROCEDURE — 36415 COLL VENOUS BLD VENIPUNCTURE: CPT

## 2021-06-23 LAB
ALBUMIN SERPL BCP-MCNC: 3.4 G/DL (ref 3.2–4.9)
ALBUMIN/GLOB SERPL: 0.9 G/DL
ALP SERPL-CCNC: 95 U/L (ref 30–99)
ALT SERPL-CCNC: 9 U/L (ref 2–50)
ANION GAP SERPL CALC-SCNC: 9 MMOL/L (ref 7–16)
AST SERPL-CCNC: 19 U/L (ref 12–45)
BASOPHILS # BLD AUTO: 0.6 % (ref 0–1.8)
BASOPHILS # BLD: 0.06 K/UL (ref 0–0.12)
BILIRUB SERPL-MCNC: 0.2 MG/DL (ref 0.1–1.5)
BUN SERPL-MCNC: 8 MG/DL (ref 8–22)
CALCIUM SERPL-MCNC: 9.1 MG/DL (ref 8.5–10.5)
CHLORIDE SERPL-SCNC: 103 MMOL/L (ref 96–112)
CO2 SERPL-SCNC: 23 MMOL/L (ref 20–33)
CREAT SERPL-MCNC: 0.27 MG/DL (ref 0.5–1.4)
EOSINOPHIL # BLD AUTO: 0.08 K/UL (ref 0–0.51)
EOSINOPHIL NFR BLD: 0.8 % (ref 0–6.9)
ERYTHROCYTE [DISTWIDTH] IN BLOOD BY AUTOMATED COUNT: 58.3 FL (ref 35.9–50)
FASTING STATUS PATIENT QL REPORTED: NORMAL
GLOBULIN SER CALC-MCNC: 4 G/DL (ref 1.9–3.5)
GLUCOSE SERPL-MCNC: 85 MG/DL (ref 65–99)
HCT VFR BLD AUTO: 35.2 % (ref 37–47)
HGB BLD-MCNC: 10 G/DL (ref 12–16)
IMM GRANULOCYTES # BLD AUTO: 0.03 K/UL (ref 0–0.11)
IMM GRANULOCYTES NFR BLD AUTO: 0.3 % (ref 0–0.9)
LYMPHOCYTES # BLD AUTO: 2.52 K/UL (ref 1–4.8)
LYMPHOCYTES NFR BLD: 23.9 % (ref 22–41)
MCH RBC QN AUTO: 23 PG (ref 27–33)
MCHC RBC AUTO-ENTMCNC: 28.4 G/DL (ref 33.6–35)
MCV RBC AUTO: 80.9 FL (ref 81.4–97.8)
MONOCYTES # BLD AUTO: 0.81 K/UL (ref 0–0.85)
MONOCYTES NFR BLD AUTO: 7.7 % (ref 0–13.4)
NEUTROPHILS # BLD AUTO: 7.06 K/UL (ref 2–7.15)
NEUTROPHILS NFR BLD: 66.7 % (ref 44–72)
NRBC # BLD AUTO: 0 K/UL
NRBC BLD-RTO: 0 /100 WBC
PLATELET # BLD AUTO: 439 K/UL (ref 164–446)
PMV BLD AUTO: 11.1 FL (ref 9–12.9)
POTASSIUM SERPL-SCNC: 4.4 MMOL/L (ref 3.6–5.5)
PROT SERPL-MCNC: 7.4 G/DL (ref 6–8.2)
RBC # BLD AUTO: 4.35 M/UL (ref 4.2–5.4)
SODIUM SERPL-SCNC: 135 MMOL/L (ref 135–145)
WBC # BLD AUTO: 10.6 K/UL (ref 4.8–10.8)

## 2021-06-24 ENCOUNTER — OFFICE VISIT (OUTPATIENT)
Dept: PHYSICAL MEDICINE AND REHAB | Facility: MEDICAL CENTER | Age: 71
End: 2021-06-24
Payer: MEDICARE

## 2021-06-24 VITALS
WEIGHT: 83.55 LBS | HEART RATE: 104 BPM | HEIGHT: 58 IN | DIASTOLIC BLOOD PRESSURE: 64 MMHG | OXYGEN SATURATION: 93 % | SYSTOLIC BLOOD PRESSURE: 120 MMHG | TEMPERATURE: 97.4 F | BODY MASS INDEX: 17.54 KG/M2

## 2021-06-24 DIAGNOSIS — M41.25 OTHER IDIOPATHIC SCOLIOSIS, THORACOLUMBAR REGION: ICD-10-CM

## 2021-06-24 DIAGNOSIS — Z72.0 TOBACCO USE: ICD-10-CM

## 2021-06-24 DIAGNOSIS — M47.816 LUMBAR SPONDYLOSIS: ICD-10-CM

## 2021-06-24 DIAGNOSIS — M54.50 LOW BACK PAIN, UNSPECIFIED BACK PAIN LATERALITY, UNSPECIFIED CHRONICITY, UNSPECIFIED WHETHER SCIATICA PRESENT: ICD-10-CM

## 2021-06-24 DIAGNOSIS — G14 POST-POLIO SYNDROME: ICD-10-CM

## 2021-06-24 PROCEDURE — 99214 OFFICE O/P EST MOD 30 MIN: CPT | Performed by: PHYSICAL MEDICINE & REHABILITATION

## 2021-06-24 RX ORDER — BUPRENORPHINE 15 UG/H
1 PATCH TRANSDERMAL
Qty: 4 PATCH | Refills: 0 | Status: SHIPPED | OUTPATIENT
Start: 2021-06-24 | End: 2021-07-22

## 2021-06-24 RX ORDER — BUPRENORPHINE 15 UG/H
1 PATCH TRANSDERMAL
Qty: 4 PATCH | Refills: 0 | Status: SHIPPED | OUTPATIENT
Start: 2021-07-22 | End: 2021-08-19

## 2021-06-24 RX ORDER — BUPRENORPHINE 15 UG/H
1 PATCH TRANSDERMAL
Qty: 4 PATCH | Refills: 0 | Status: SHIPPED | OUTPATIENT
Start: 2021-08-19 | End: 2021-09-15 | Stop reason: SDUPTHER

## 2021-06-24 ASSESSMENT — FIBROSIS 4 INDEX: FIB4 SCORE: 0.98

## 2021-06-24 ASSESSMENT — PAIN SCALES - GENERAL: PAINLEVEL: 8=MODERATE-SEVERE PAIN

## 2021-06-24 ASSESSMENT — PATIENT HEALTH QUESTIONNAIRE - PHQ9: CLINICAL INTERPRETATION OF PHQ2 SCORE: 0

## 2021-06-24 NOTE — PROGRESS NOTES
"Follow-up patient note    Physiatry (physical medicine and  Rehabilitation), interventional spine and sports medicine    Date of Service: 06/24/2021    Chief complaint: Chronic pain    HISTORY    HPI: Jewell Skelton 70 y.o. female who presents today, accompanied by her daughter, for follow-up of pain related to her scoliosis, low back and history of post-polio.      Jewell reports that she continues to have pain.  This is generally unchanged, pain is worst in the back when she is up too long.    Butrans patch 15mcg/hr without side effects.  She takes up to 3500mg/day Bowel movements are regular.      No falls.  She uses her axillary crutches and KAFO.      Mobility and ADLs are easier with change in medications.  No falls since last visit.    Pain is a 8/10 on the NRS at the worst.    She is working on quitting smoking, but this is not going that well.        ORT: 15   reviewed today.    PHQ9 -0    Medical records review:  Previous treatments:    Physical Therapy: Yes, in the past    Medications the patient is tried: Narcotics, tylenol, muscle relaxants      Previous interventions: yes    Previous surgeries to relieve the above pain:  none      ROS:   Eyes: Wears glasses  ENT: history of post-nasal drip  Resp:COPD  GI:h/o ulcers, acid reflux, Hep C  Skin: skin cancer  Heme: history of bleeding    Red Flags ROS:   Fever, Chills, Sweats: Denies  Involuntary Weight Loss: Denies  Bladder Incontinence: Denies  Bowel Incontinence: denies  Saddle Anesthesia: Denies    All other systems reviewed and negative.       PMHx:   Past Medical History:   Diagnosis Date   • Abnormal radiologic finding of lung field 9/8/2017    History of bilateral pulmonary nodules   • Anesthesia     patient's mom had side effects   • Arthritis 10/31/2018    \"everywhere\"   • Asthma    • Back pain    • Breath shortness    • Cancer ([facility]) 2013    squamous cell   • Chickenpox    • COPD (chronic obstructive pulmonary disease) ([facility]) 9/8/2017   • Dental " "disorder 10/31/2018    upper   • Emphysema of lung (HCC)    • Fatigue    • Welsh measles    • Heart burn    • Hepatitis C     dx 10/2018   • Hypertension 10/31/2018   • Influenza    • Mumps    • Polio    • Seizure (HCC)     \"possibly 1 time in 2000\"   • Wears glasses        PSHx:   Past Surgical History:   Procedure Laterality Date   • FINGER ARTHROPLASTY Right 11/2/2018    Procedure: FINGER ARTHROPLASTY - THUMB CARPOMETACARPAL FUSION VERSUS THUMB METACARPOPHALANGEAL FUSION, THUMB CARPOMETACARPAL EXCISIONAL ARTHROPLASTY, FLEXOR CARPI RADIALIS GRAFT;  Surgeon: Jerzy Ritchie M.D.;  Location: SURGERY SAME DAY [facility];  Service: Orthopedics   • CHOLECYSTECTOMY     • GASTROSTOMY     • OTHER ORTHOPEDIC SURGERY      Left ankle fusion, right foot surgery   • PRIMARY C SECTION      hysterectomy 1995   • TONSILLECTOMY         Family history   Family History   Problem Relation Age of Onset   • Diabetes Mother    • Lung Cancer Father          Medications:   Outpatient Medications Marked as Taking for the 6/24/21 encounter (Office Visit) with Neftali Painter M.D.   Medication Sig Dispense Refill   • Buprenorphine 15 MCG/HR PATCH WEEKLY Place 1 Patch on the skin every 7 days for 28 days. 4 Patch 0   • [START ON 7/22/2021] Buprenorphine 15 MCG/HR PATCH WEEKLY Place 1 Patch on the skin every 7 days for 28 days. 4 Patch 0   • [START ON 8/19/2021] Buprenorphine 15 MCG/HR PATCH WEEKLY Place 1 Patch on the skin every 7 days for 28 days. 4 Patch 0   • mirtazapine (REMERON) 30 MG Tab tablet Take 1 tablet by mouth every bedtime.     • fluticasone (FLONASE) 50 MCG/ACT nasal spray ONE TO TWO SPRAYS IN EACH NOSTRIL DAILY AS NEEDED FOR CONGESTION     • famotidine (PEPCID) 20 MG Tab      • valacyclovir (VALTREX) 1 GM Tab      • polyethylene glycol 3350 (MIRALAX) Powder Take 17 g by mouth 1 time daily as needed. 1 Bottle 2   • loratadine (CLARITIN) 10 MG Tab Take 10 mg by mouth every day.     • mupirocin (BACTROBAN) 2 % Ointment   " "1   • amLODIPine (NORVASC) 2.5 MG Tab 7.5 mg 1 time daily as needed. Takes 5mg and 2.5mg for total of 7.5mg  3   • BREO ELLIPTA 100-25 MCG/INH AEROSOL POWDER, BREATH ACTIVATED INHALE 1 PUFF BY MOUTH EVERY DAY. RINSE MOUTH AFTER USE. 1 Each 5   • albuterol 108 (90 Base) MCG/ACT Aero Soln inhalation aerosol Inhale 2 Puffs by mouth every 6 hours as needed for Shortness of Breath.     • acetaminophen (TYLENOL) 325 MG Tab Take 650 mg by mouth every four hours as needed.     • tiotropium (SPIRIVA HANDIHALER) 18 MCG Cap Inhale 18 mcg by mouth every day.     • Fluticasone Furoate-Vilanterol (BREO ELLIPTA) 100-25 MCG/INH AEROSOL POWDER, BREATH ACTIVATED Inhale 1 Puff by mouth every day. Rinse mouth after use. 1 Each 5   • busPIRone (BUSPAR) 30 MG tablet TAKE ONE TABLET BY MOUTH TWICE PER DAY.  2   • Calcium Carb-Cholecalciferol (CALCIUM 600 + D PO) Take 1 tablet by mouth every day.     • amlodipine (NORVASC) 5 MG Tab Take 5 mg by mouth every day.           Allergies:   Allergies   Allergen Reactions   • Tape Unspecified     Rips off epidermis   • Ambien [Zolpidem] Unspecified     Memory loss   • Augmentin Nausea   • Bloodless      protocal per patient request; MD notified       Social Hx:   Social History     Socioeconomic History   • Marital status:      Spouse name: Not on file   • Number of children: Not on file   • Years of education: Not on file   • Highest education level: Not on file   Occupational History   • Not on file   Tobacco Use   • Smoking status: Current Every Day Smoker     Packs/day: 0.50     Years: 47.00     Pack years: 23.50     Types: Cigarettes   • Smokeless tobacco: Never Used   • Tobacco comment: \"working on cutting down as a family\"   Substance and Sexual Activity   • Alcohol use: No   • Drug use: No   • Sexual activity: Not on file   Other Topics Concern   •  Service No   • Blood Transfusions No   • Caffeine Concern No   • Occupational Exposure No   • Hobby Hazards No   • Sleep " "Concern No   • Stress Concern Yes   • Weight Concern No   • Special Diet No   • Back Care No   • Exercise Yes   • Bike Helmet No   • Seat Belt Yes   • Self-Exams Yes   Social History Narrative   • Not on file     Social Determinants of Health     Financial Resource Strain:    • Difficulty of Paying Living Expenses:    Food Insecurity:    • Worried About Running Out of Food in the Last Year:    • Ran Out of Food in the Last Year:    Transportation Needs:    • Lack of Transportation (Medical):    • Lack of Transportation (Non-Medical):    Physical Activity:    • Days of Exercise per Week:    • Minutes of Exercise per Session:    Stress:    • Feeling of Stress :    Social Connections:    • Frequency of Communication with Friends and Family:    • Frequency of Social Gatherings with Friends and Family:    • Attends [employer] Services:    • Active Member of Clubs or Organizations:    • Attends Club or Organization Meetings:    • Marital Status:    Intimate Partner Violence:    • Fear of Current or Ex-Partner:    • Emotionally Abused:    • Physically Abused:    • Sexually Abused:          EXAMINATION     Physical Exam:   Vitals: /64 (BP Location: Right arm, Patient Position: Sitting, BP Cuff Size: Small adult)   Pulse (!) 104   Temp 36.3 °C (97.4 °F) (Temporal)   Ht 1.473 m (4' 10\")   Wt 37.9 kg (83 lb 8.9 oz)   SpO2 93%     Constitutional:   Body Habitus: Body mass index is 17.46 kg/m².  Cooperation: Fully cooperates with exam  Appearance: Well-groomed, very thin, not disheveled, in no acute distress    Eyes: No scleral icterus, no proptosis     ENT -no obvious auditory deficits, wearing a face mask    Skin -no rashes or lesions noted,     Respiratory-  breathing comfortable on room air, no audible wheezing    Cardiovascular- No lower extremity edema in the legs    Psychiatric- alert and oriented ×3. Normal affect.     Gait - Abnormal gait, left KAFO and axillary crutches.         MEDICAL DECISION " MAKING    Medical records review: see under HPI section.     DATA  UDS 02/19/2019 Negative for substances, including buprenorphine.  This may test negative with use of butrans patch.  UDS 07/18/2019: Negative for buprenorphine.  Positive for kratom.  UDS 09/05/2019 Negative for buprenorphine.  Positive for low doses of kratom  UDS: 10/29/2019: Buprenorphine is negative, which can be the case with the low doses for butrans patch.  Positive for trace amounts of marijuana and kratom  UDS 12/20/2019 Negative for buprenorphine.  Positive for low dose of kratom and THC   UDS 02/20/2020: Buprenorphine is negative, not unexpected for butrans patch, negative for other substances  UDS 01/12/2021: Buprenorphine is negative, not unexpected for butrans patch, positive for kratom  UDS 03/26/2021: Buprenorphine is negative, not unexpected for butrans patch, positive for kratom  UDS 05/04/2021: Buprenorphine is negative, not unexpected for butrans patch    Labs:   Lab Results   Component Value Date/Time    SODIUM 135 06/22/2021 07:56 AM    POTASSIUM 4.4 06/22/2021 07:56 AM    CHLORIDE 103 06/22/2021 07:56 AM    CO2 23 06/22/2021 07:56 AM    ANION 9.0 06/22/2021 07:56 AM    GLUCOSE 85 06/22/2021 07:56 AM    BUN 8 06/22/2021 07:56 AM    CREATININE 0.27 (L) 06/22/2021 07:56 AM    CALCIUM 9.1 06/22/2021 07:56 AM    ASTSGOT 19 06/22/2021 07:56 AM    ALTSGPT 9 06/22/2021 07:56 AM    TBILIRUBIN 0.2 06/22/2021 07:56 AM    ALBUMIN 3.4 06/22/2021 07:56 AM    TOTPROTEIN 7.4 06/22/2021 07:56 AM    GLOBULIN 4.0 (H) 06/22/2021 07:56 AM    AGRATIO 0.9 06/22/2021 07:56 AM          Lab Results   Component Value Date/Time    WBC 10.6 06/22/2021 07:56 AM    RBC 4.35 06/22/2021 07:56 AM    HEMOGLOBIN 10.0 (L) 06/22/2021 07:56 AM    HEMATOCRIT 35.2 (L) 06/22/2021 07:56 AM    MCV 80.9 (L) 06/22/2021 07:56 AM    MCH 23.0 (L) 06/22/2021 07:56 AM    MCHC 28.4 (L) 06/22/2021 07:56 AM    MPV 11.1 06/22/2021 07:56 AM    NEUTSPOLYS 66.70 06/22/2021 07:56 AM     LYMPHOCYTES 23.90 06/22/2021 07:56 AM    MONOCYTES 7.70 06/22/2021 07:56 AM    EOSINOPHILS 0.80 06/22/2021 07:56 AM    BASOPHILS 0.60 06/22/2021 07:56 AM           Imaging:  MRI lumbar spine: Reviewed, this is my interpretation   12/17/2018  There is note of multilevel disc bulges without central canal stenosis.  There is lumbar facet hypertrophy noted in the lumbar facets bilaterally.  Foraminal canal stenosis is moderate right L2-3, mild L4-5 bilaterally.    MRI lumbar spine report reviewed  12/17/2018 Multilevel degenerative changes of the lumbar spine as described                                                                                                                                                                    DEXA: 04/10/2019  According to the World Health Organization classification, bone mineral density of this patient is osteoporosis with high risk of fracture in the femoral region and normal in the lumbar region.    10-year Probability of Fracture:  Major Osteoporotic     21.1%  Hip     6.6%  Population      [place] ()    Based on left femur neck BMD      Diagnosis   Visit Diagnoses     ICD-10-CM   1. Other idiopathic scoliosis, thoracolumbar region  M41.25   2. 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 Lumbar spondylosis  M47.816   4. Low back pain, unspecified back pain laterality, unspecified chronicity, unspecified whether sciatica present  M54.5   5. Tobacco use  Z72.0       ASSESSMENT:  Jewell Skelton 70 y.o. female with history of polio, scoliosis, low back pain with radiation     Jewell was seen today for follow-up.    Diagnoses and all orders for this visit:    Other idiopathic scoliosis, thoracolumbar region  -     Buprenorphine 15 MCG/HR PATCH WEEKLY; Place 1 Patch on the skin every 7 days for 28 days.  -     Buprenorphine 15 MCG/HR PATCH WEEKLY; Place 1 Patch on the skin every 7 days for 28 days.  -     Buprenorphine 15 MCG/HR PATCH WEEKLY; Place 1 Patch on the skin every 7 days for 28  days.  -     Consent for Opiate Prescription  -     Controlled Substance Treatment Agreement    Post-polio syndrome  -     Buprenorphine 15 MCG/HR PATCH WEEKLY; Place 1 Patch on the skin every 7 days for 28 days.  -     Buprenorphine 15 MCG/HR PATCH WEEKLY; Place 1 Patch on the skin every 7 days for 28 days.  -     Buprenorphine 15 MCG/HR PATCH WEEKLY; Place 1 Patch on the skin every 7 days for 28 days.  -     Consent for Opiate Prescription  -     Controlled Substance Treatment Agreement    Lumbar spondylosis  -     Buprenorphine 15 MCG/HR PATCH WEEKLY; Place 1 Patch on the skin every 7 days for 28 days.  -     Buprenorphine 15 MCG/HR PATCH WEEKLY; Place 1 Patch on the skin every 7 days for 28 days.  -     Buprenorphine 15 MCG/HR PATCH WEEKLY; Place 1 Patch on the skin every 7 days for 28 days.  -     Consent for Opiate Prescription  -     Controlled Substance Treatment Agreement    Low back pain, unspecified back pain laterality, unspecified chronicity, unspecified whether sciatica present  -     Buprenorphine 15 MCG/HR PATCH WEEKLY; Place 1 Patch on the skin every 7 days for 28 days.  -     Buprenorphine 15 MCG/HR PATCH WEEKLY; Place 1 Patch on the skin every 7 days for 28 days.  -     Buprenorphine 15 MCG/HR PATCH WEEKLY; Place 1 Patch on the skin every 7 days for 28 days.  -     Consent for Opiate Prescription  -     Controlled Substance Treatment Agreement    Tobacco use       1. Encouraged her to follow-up with [facility] regarding adjustments to the left KAFO.  She has not been using her new brace, continued recurvatum, mild.  Unable to find shoes to accommodate her new brace  2. Reviewed most recent labs.  3. Continue butrans patch. Most recent UDS reviewed.   reviewed. Consents obtained. Refills given for the next three months.  4. Encouraged smoking cessation.  She's not actively trying to quit at this time.       Follow-up: 3 months    Please note that this dictation was created using voice recognition  software. I have made every reasonable attempt to correct obvious errors but there may be errors of grammar and content that I may have overlooked prior to finalization of this note.      Neftali Painter MD  Physical Medicine and Rehabilitation  Interventional Spine and Sports Physiatry  [facility]

## 2021-09-15 ENCOUNTER — OFFICE VISIT (OUTPATIENT)
Dept: PHYSICAL MEDICINE AND REHAB | Facility: MEDICAL CENTER | Age: 71
End: 2021-09-15
Payer: MEDICARE

## 2021-09-15 VITALS
HEART RATE: 100 BPM | HEIGHT: 58 IN | BODY MASS INDEX: 17.72 KG/M2 | SYSTOLIC BLOOD PRESSURE: 132 MMHG | TEMPERATURE: 98.4 F | RESPIRATION RATE: 20 BRPM | DIASTOLIC BLOOD PRESSURE: 64 MMHG | OXYGEN SATURATION: 98 % | WEIGHT: 84.44 LBS

## 2021-09-15 DIAGNOSIS — G14 POST-POLIO SYNDROME: ICD-10-CM

## 2021-09-15 DIAGNOSIS — R26.9 ABNORMAL GAIT: ICD-10-CM

## 2021-09-15 DIAGNOSIS — M54.50 LOW BACK PAIN, UNSPECIFIED BACK PAIN LATERALITY, UNSPECIFIED CHRONICITY, UNSPECIFIED WHETHER SCIATICA PRESENT: ICD-10-CM

## 2021-09-15 DIAGNOSIS — Z72.0 TOBACCO USE: ICD-10-CM

## 2021-09-15 DIAGNOSIS — M47.816 LUMBAR SPONDYLOSIS: ICD-10-CM

## 2021-09-15 DIAGNOSIS — M41.25 OTHER IDIOPATHIC SCOLIOSIS, THORACOLUMBAR REGION: ICD-10-CM

## 2021-09-15 PROCEDURE — 99214 OFFICE O/P EST MOD 30 MIN: CPT | Performed by: PHYSICAL MEDICINE & REHABILITATION

## 2021-09-15 RX ORDER — NALOXONE HYDROCHLORIDE 4 MG/.1ML
SPRAY NASAL
Qty: 1 EACH | Refills: 0 | Status: SHIPPED | OUTPATIENT
Start: 2021-09-15

## 2021-09-15 RX ORDER — BUPRENORPHINE 15 UG/H
1 PATCH TRANSDERMAL
Qty: 4 PATCH | Refills: 0 | Status: SHIPPED | OUTPATIENT
Start: 2021-10-13 | End: 2021-11-10

## 2021-09-15 RX ORDER — BUPRENORPHINE 15 UG/H
1 PATCH TRANSDERMAL
Qty: 4 PATCH | Refills: 0 | Status: SHIPPED | OUTPATIENT
Start: 2021-09-15 | End: 2021-09-15

## 2021-09-15 RX ORDER — BUPRENORPHINE 15 UG/H
1 PATCH TRANSDERMAL
Qty: 4 PATCH | Refills: 0 | Status: SHIPPED | OUTPATIENT
Start: 2021-11-10 | End: 2021-12-08

## 2021-09-15 RX ORDER — BUPRENORPHINE 15 UG/H
1 PATCH TRANSDERMAL
Qty: 4 PATCH | Refills: 0 | Status: SHIPPED | OUTPATIENT
Start: 2021-09-15 | End: 2021-10-13

## 2021-09-15 ASSESSMENT — PAIN SCALES - GENERAL: PAINLEVEL: 6=MODERATE PAIN

## 2021-09-15 ASSESSMENT — FIBROSIS 4 INDEX: FIB4 SCORE: 1

## 2021-09-15 ASSESSMENT — PATIENT HEALTH QUESTIONNAIRE - PHQ9: CLINICAL INTERPRETATION OF PHQ2 SCORE: 0

## 2021-09-15 NOTE — PROGRESS NOTES
"Follow-up patient note    Physiatry (physical medicine and  Rehabilitation), interventional spine and sports medicine    Date of Service: 09/15/2021    Chief complaint: Chronic pain    HISTORY    HPI: Jewell Skelton 71 y.o. female who presents today, accompanied by her daughter, for follow-up of pain related to her scoliosis, low back and history of post-polio.      Jewell reports that she has been doing okay with regard to her pain.  No significant worsening. Regular bowel movements.      Butrans patch is stable at 15mcg/hour.  No side effects.  Dressing independently, gets help bathing.  Some difficulty with sitting too low, using a chair in the shower.    Getting some shoes at [facility] to accommodate her new KAFO.  Uses axillary crutches.  No falls.    Pain is a 6/10 on the NRS at the worst.    She is working on quitting smoking, but this has been somewhat challenging.  Possibly moving to [place].      ORT: 15   reviewed today.    PHQ9 -0    Medical records review:  Previous treatments:    Physical Therapy: Yes, in the past    Medications the patient is tried: Narcotics, tylenol, muscle relaxants      Previous interventions: yes    Previous surgeries to relieve the above pain:  none      ROS:   Eyes: Wears glasses  ENT: history of post-nasal drip  Resp:COPD  GI:h/o ulcers, acid reflux, Hep C  Skin: skin cancer  Heme: history of bleeding    Red Flags ROS:   Fever, Chills, Sweats: Denies  Involuntary Weight Loss: Denies  Bladder Incontinence: Denies  Bowel Incontinence: denies  Saddle Anesthesia: Denies    All other systems reviewed and negative.       PMHx:   Past Medical History:   Diagnosis Date   • Abnormal radiologic finding of lung field 9/8/2017    History of bilateral pulmonary nodules   • Anesthesia     patient's mom had side effects   • Arthritis 10/31/2018    \"everywhere\"   • Asthma    • Back pain    • Breath shortness    • Cancer (HCC) 2013    squamous cell   • Chickenpox    • COPD (chronic obstructive " "pulmonary disease) (HCC) 9/8/2017   • Dental disorder 10/31/2018    upper   • Emphysema of lung (HCC)    • Fatigue    • Hebrew measles    • Heart burn    • Hepatitis C     dx 10/2018   • Hypertension 10/31/2018   • Influenza    • Mumps    • Polio    • Seizure (HCC)     \"possibly 1 time in 2000\"   • Wears glasses        PSHx:   Past Surgical History:   Procedure Laterality Date   • FINGER ARTHROPLASTY Right 11/2/2018    Procedure: FINGER ARTHROPLASTY - THUMB CARPOMETACARPAL FUSION VERSUS THUMB METACARPOPHALANGEAL FUSION, THUMB CARPOMETACARPAL EXCISIONAL ARTHROPLASTY, FLEXOR CARPI RADIALIS GRAFT;  Surgeon: Jerzy Ritchie M.D.;  Location: SURGERY SAME DAY [facility];  Service: Orthopedics   • CHOLECYSTECTOMY     • GASTROSTOMY     • OTHER ORTHOPEDIC SURGERY      Left ankle fusion, right foot surgery   • PRIMARY C SECTION      hysterectomy 1995   • TONSILLECTOMY         Family history   Family History   Problem Relation Age of Onset   • Diabetes Mother    • Lung Cancer Father          Medications:   Outpatient Medications Marked as Taking for the 9/15/21 encounter (Office Visit) with Neftali Painter M.D.   Medication Sig Dispense Refill   • [START ON 10/13/2021] Buprenorphine 15 MCG/HR PATCH WEEKLY Place 1 Patch on the skin every 7 days for 28 days. 4 Patch 0   • [START ON 11/10/2021] Buprenorphine 15 MCG/HR PATCH WEEKLY Place 1 Patch on the skin every 7 days for 28 days. 4 Patch 0   • Naloxone (NARCAN) 4 MG/0.1ML Liquid One spray in one nostril for overdose and call 911. 1 Each 0   • Buprenorphine 15 MCG/HR PATCH WEEKLY Place 1 Patch on the skin every 7 days for 28 days. 4 Patch 0   • mirtazapine (REMERON) 30 MG Tab tablet Take 1 tablet by mouth every bedtime.     • fluticasone (FLONASE) 50 MCG/ACT nasal spray ONE TO TWO SPRAYS IN EACH NOSTRIL DAILY AS NEEDED FOR CONGESTION     • famotidine (PEPCID) 20 MG Tab      • valacyclovir (VALTREX) 1 GM Tab      • polyethylene glycol 3350 (MIRALAX) Powder Take 17 g by " "mouth 1 time daily as needed. 1 Bottle 2   • loratadine (CLARITIN) 10 MG Tab Take 10 mg by mouth every day.     • mupirocin (BACTROBAN) 2 % Ointment   1   • amLODIPine (NORVASC) 2.5 MG Tab 7.5 mg 1 time daily as needed. Takes 5mg and 2.5mg for total of 7.5mg  3   • BREO ELLIPTA 100-25 MCG/INH AEROSOL POWDER, BREATH ACTIVATED INHALE 1 PUFF BY MOUTH EVERY DAY. RINSE MOUTH AFTER USE. 1 Each 5   • albuterol 108 (90 Base) MCG/ACT Aero Soln inhalation aerosol Inhale 2 Puffs by mouth every 6 hours as needed for Shortness of Breath.     • acetaminophen (TYLENOL) 325 MG Tab Take 650 mg by mouth every four hours as needed.     • tiotropium (SPIRIVA HANDIHALER) 18 MCG Cap Inhale 18 mcg by mouth every day.     • Fluticasone Furoate-Vilanterol (BREO ELLIPTA) 100-25 MCG/INH AEROSOL POWDER, BREATH ACTIVATED Inhale 1 Puff by mouth every day. Rinse mouth after use. 1 Each 5   • busPIRone (BUSPAR) 30 MG tablet TAKE ONE TABLET BY MOUTH TWICE PER DAY.  2   • Calcium Carb-Cholecalciferol (CALCIUM 600 + D PO) Take 1 tablet by mouth every day.     • amlodipine (NORVASC) 5 MG Tab Take 5 mg by mouth every day.           Allergies:   Allergies   Allergen Reactions   • Tape Unspecified     Rips off epidermis   • Ambien [Zolpidem] Unspecified     Memory loss   • Augmentin Nausea   • Bloodless      protocal per patient request; MD notified       Social Hx:   Social History     Socioeconomic History   • Marital status:      Spouse name: Not on file   • Number of children: Not on file   • Years of education: Not on file   • Highest education level: Not on file   Occupational History   • Not on file   Tobacco Use   • Smoking status: Current Every Day Smoker     Packs/day: 0.50     Years: 47.00     Pack years: 23.50     Types: Cigarettes   • Smokeless tobacco: Never Used   • Tobacco comment: \"working on cutting down as a family\"   Substance and Sexual Activity   • Alcohol use: No   • Drug use: No   • Sexual activity: Not on file   Other " "Topics Concern   •  Service No   • Blood Transfusions No   • Caffeine Concern No   • Occupational Exposure No   • Hobby Hazards No   • Sleep Concern No   • Stress Concern Yes   • Weight Concern No   • Special Diet No   • Back Care No   • Exercise Yes   • Bike Helmet No   • Seat Belt Yes   • Self-Exams Yes   Social History Narrative   • Not on file     Social Determinants of Health     Financial Resource Strain:    • Difficulty of Paying Living Expenses:    Food Insecurity:    • Worried About Running Out of Food in the Last Year:    • Ran Out of Food in the Last Year:    Transportation Needs:    • Lack of Transportation (Medical):    • Lack of Transportation (Non-Medical):    Physical Activity:    • Days of Exercise per Week:    • Minutes of Exercise per Session:    Stress:    • Feeling of Stress :    Social Connections:    • Frequency of Communication with Friends and Family:    • Frequency of Social Gatherings with Friends and Family:    • Attends [employer] Services:    • Active Member of Clubs or Organizations:    • Attends Club or Organization Meetings:    • Marital Status:    Intimate Partner Violence:    • Fear of Current or Ex-Partner:    • Emotionally Abused:    • Physically Abused:    • Sexually Abused:          EXAMINATION     Physical Exam:   Vitals: /64 (BP Location: Right arm, Patient Position: Sitting, BP Cuff Size: Adult)   Pulse 100   Temp 36.9 °C (98.4 °F) (Temporal)   Resp 20   Ht 1.473 m (4' 9.99\")   Wt 38.3 kg (84 lb 7 oz)   SpO2 98%     Constitutional:   Body Habitus: Body mass index is 17.65 kg/m².  Cooperation: Fully cooperates with exam  Appearance: Well-groomed, very thin, not disheveled, in no acute distress    Eyes: No scleral icterus, no proptosis     ENT -no obvious auditory deficits, wearing a face mask    Skin -no rashes or lesions noted,     Respiratory-  breathing comfortable on room air, no audible wheezing    Cardiovascular- No lower extremity edema in the " legs    Gait - Abnormal gait, left KAFO and axillary crutches.     Left KAFO in place.  Right knee with 4-/5 knee extension, 4-/4 knee flexion.      MEDICAL DECISION MAKING    Medical records review: see under HPI section.     DATA  UDS 02/19/2019 Negative for substances, including buprenorphine.  This may test negative with use of butrans patch.  UDS 07/18/2019: Negative for buprenorphine.  Positive for kratom.  UDS 09/05/2019 Negative for buprenorphine.  Positive for low doses of kratom  UDS: 10/29/2019: Buprenorphine is negative, which can be the case with the low doses for butrans patch.  Positive for trace amounts of marijuana and kratom  UDS 12/20/2019 Negative for buprenorphine.  Positive for low dose of kratom and THC   UDS 02/20/2020: Buprenorphine is negative, not unexpected for butrans patch, negative for other substances  UDS 01/12/2021: Buprenorphine is negative, not unexpected for butrans patch, positive for kratom  UDS 03/26/2021: Buprenorphine is negative, not unexpected for butrans patch, positive for kratom  UDS 05/04/2021: Buprenorphine is negative, not unexpected for butrans patch      Labs:   Lab Results   Component Value Date/Time    SODIUM 135 06/22/2021 07:56 AM    POTASSIUM 4.4 06/22/2021 07:56 AM    CHLORIDE 103 06/22/2021 07:56 AM    CO2 23 06/22/2021 07:56 AM    ANION 9.0 06/22/2021 07:56 AM    GLUCOSE 85 06/22/2021 07:56 AM    BUN 8 06/22/2021 07:56 AM    CREATININE 0.27 (L) 06/22/2021 07:56 AM    CALCIUM 9.1 06/22/2021 07:56 AM    ASTSGOT 19 06/22/2021 07:56 AM    ALTSGPT 9 06/22/2021 07:56 AM    TBILIRUBIN 0.2 06/22/2021 07:56 AM    ALBUMIN 3.4 06/22/2021 07:56 AM    TOTPROTEIN 7.4 06/22/2021 07:56 AM    GLOBULIN 4.0 (H) 06/22/2021 07:56 AM    AGRATIO 0.9 06/22/2021 07:56 AM          Lab Results   Component Value Date/Time    WBC 10.6 06/22/2021 07:56 AM    RBC 4.35 06/22/2021 07:56 AM    HEMOGLOBIN 10.0 (L) 06/22/2021 07:56 AM    HEMATOCRIT 35.2 (L) 06/22/2021 07:56 AM    MCV 80.9 (L)  06/22/2021 07:56 AM    MCH 23.0 (L) 06/22/2021 07:56 AM    MCHC 28.4 (L) 06/22/2021 07:56 AM    MPV 11.1 06/22/2021 07:56 AM    NEUTSPOLYS 66.70 06/22/2021 07:56 AM    LYMPHOCYTES 23.90 06/22/2021 07:56 AM    MONOCYTES 7.70 06/22/2021 07:56 AM    EOSINOPHILS 0.80 06/22/2021 07:56 AM    BASOPHILS 0.60 06/22/2021 07:56 AM           Imaging:  MRI lumbar spine: Reviewed, this is my interpretation   12/17/2018  There is note of multilevel disc bulges without central canal stenosis.  There is lumbar facet hypertrophy noted in the lumbar facets bilaterally.  Foraminal canal stenosis is moderate right L2-3, mild L4-5 bilaterally.    MRI lumbar spine report reviewed  12/17/2018 Multilevel degenerative changes of the lumbar spine as described                                                                                                                                                                    DEXA: 04/10/2019  According to the World Health Organization classification, bone mineral density of this patient is osteoporosis with high risk of fracture in the femoral region and normal in the lumbar region.    10-year Probability of Fracture:  Major Osteoporotic     21.1%  Hip     6.6%  Population      [place] ()    Based on left femur neck BMD      Diagnosis   Visit Diagnoses     ICD-10-CM   1. Other idiopathic scoliosis, thoracolumbar region  M41.25   2. 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 Lumbar spondylosis  M47.816   4. Low back pain, unspecified back pain laterality, unspecified chronicity, unspecified whether sciatica present  M54.5   5. Tobacco use  Z72.0   6. Abnormal gait  R26.9       ASSESSMENT:  Jewell Skelton 71 y.o. female with history of polio, scoliosis, low back pain with radiation     Jewell was seen today for follow-up.    Diagnoses and all orders for this visit:    Other idiopathic scoliosis, thoracolumbar region  -     Pain Management Screen  -     Buprenorphine 15 MCG/HR PATCH WEEKLY; Place 1 Patch on the  skin every 7 days for 28 days.  -     Discontinue: Buprenorphine 15 MCG/HR PATCH WEEKLY; Place 1 Patch on the skin every 7 days for 28 days.  -     Buprenorphine 15 MCG/HR PATCH WEEKLY; Place 1 Patch on the skin every 7 days for 28 days.  -     Consent for Opiate Prescription  -     Controlled Substance Treatment Agreement  -     Naloxone (NARCAN) 4 MG/0.1ML Liquid; One spray in one nostril for overdose and call 911.  -     Buprenorphine 15 MCG/HR PATCH WEEKLY; Place 1 Patch on the skin every 7 days for 28 days.    Post-polio syndrome  -     Pain Management Screen  -     Buprenorphine 15 MCG/HR PATCH WEEKLY; Place 1 Patch on the skin every 7 days for 28 days.  -     Discontinue: Buprenorphine 15 MCG/HR PATCH WEEKLY; Place 1 Patch on the skin every 7 days for 28 days.  -     Buprenorphine 15 MCG/HR PATCH WEEKLY; Place 1 Patch on the skin every 7 days for 28 days.  -     Consent for Opiate Prescription  -     Controlled Substance Treatment Agreement  -     Naloxone (NARCAN) 4 MG/0.1ML Liquid; One spray in one nostril for overdose and call 911.  -     Buprenorphine 15 MCG/HR PATCH WEEKLY; Place 1 Patch on the skin every 7 days for 28 days.    Lumbar spondylosis  -     Pain Management Screen  -     Buprenorphine 15 MCG/HR PATCH WEEKLY; Place 1 Patch on the skin every 7 days for 28 days.  -     Discontinue: Buprenorphine 15 MCG/HR PATCH WEEKLY; Place 1 Patch on the skin every 7 days for 28 days.  -     Buprenorphine 15 MCG/HR PATCH WEEKLY; Place 1 Patch on the skin every 7 days for 28 days.  -     Consent for Opiate Prescription  -     Controlled Substance Treatment Agreement  -     Naloxone (NARCAN) 4 MG/0.1ML Liquid; One spray in one nostril for overdose and call 911.  -     Buprenorphine 15 MCG/HR PATCH WEEKLY; Place 1 Patch on the skin every 7 days for 28 days.    Low back pain, unspecified back pain laterality, unspecified chronicity, unspecified whether sciatica present  -     Pain Management Screen  -      Buprenorphine 15 MCG/HR PATCH WEEKLY; Place 1 Patch on the skin every 7 days for 28 days.  -     Discontinue: Buprenorphine 15 MCG/HR PATCH WEEKLY; Place 1 Patch on the skin every 7 days for 28 days.  -     Buprenorphine 15 MCG/HR PATCH WEEKLY; Place 1 Patch on the skin every 7 days for 28 days.  -     Consent for Opiate Prescription  -     Controlled Substance Treatment Agreement  -     Naloxone (NARCAN) 4 MG/0.1ML Liquid; One spray in one nostril for overdose and call 911.  -     Buprenorphine 15 MCG/HR PATCH WEEKLY; Place 1 Patch on the skin every 7 days for 28 days.    Tobacco use    Abnormal gait       1. She will continue with plans to follow-up with [facility] regarding new shoes that will accommodate her newer left KAFO.  She is stable with use of axillary crtuches.  No falls.  2. Reviewed most recent UDS.   reviewed.  Refilled butrans 15mcg/hr.    3. Encouraged her to quit smoking, but spent less than 3 minutes.  4. She and her daughter are contemplating moving to [place], they will let me know.     Follow-up: 3 months, prn    Please note that this dictation was created using voice recognition software. I have made every reasonable attempt to correct obvious errors but there may be errors of grammar and content that I may have overlooked prior to finalization of this note.      Neftali Painter MD  Physical Medicine and Rehabilitation  Interventional Spine and Sports Physiatry  [facility]

## 2021-09-21 ENCOUNTER — HOSPITAL ENCOUNTER (OUTPATIENT)
Dept: RADIOLOGY | Facility: MEDICAL CENTER | Age: 71
End: 2021-09-21
Attending: PHYSICIAN ASSISTANT
Payer: MEDICARE

## 2021-09-21 DIAGNOSIS — Z12.31 VISIT FOR SCREENING MAMMOGRAM: ICD-10-CM

## 2021-09-21 PROCEDURE — 77063 BREAST TOMOSYNTHESIS BI: CPT

## 2021-10-11 ENCOUNTER — PATIENT MESSAGE (OUTPATIENT)
Dept: HEALTH INFORMATION MANAGEMENT | Facility: OTHER | Age: 71
End: 2021-10-11

## 2022-11-07 ENCOUNTER — PATIENT MESSAGE (OUTPATIENT)
Dept: HEALTH INFORMATION MANAGEMENT | Facility: OTHER | Age: 72
End: 2022-11-07

## (undated) DEVICE — SUCTION INSTRUMENT YANKAUER BULBOUS TIP W/O VENT (50EA/CA)

## (undated) DEVICE — GOWN WARMING STANDARD FLEX - (30/CA)

## (undated) DEVICE — HEAD HOLDER JUNIOR/ADULT

## (undated) DEVICE — NEEDLE NON SAFETY 25 GA X 1 1/2 IN HYPO (100EA/BX)

## (undated) DEVICE — SUTURE 4-0 ETHILON FS-2 18 (36PK/BX)"

## (undated) DEVICE — ELECTRODE DUAL RETURN W/ CORD - (50/PK)

## (undated) DEVICE — SODIUM CHL IRRIGATION 0.9% 1000ML (12EA/CA)

## (undated) DEVICE — NEPTUNE 4 PORT MANIFOLD - (20/PK)

## (undated) DEVICE — VESSELOOP MAXI BLUE STERILE- SURG-I-LOOP (10EA/BX)

## (undated) DEVICE — CATHETER IV 20 GA X 1-1/4 ---SURG.& SDS ONLY--- (50EA/BX)

## (undated) DEVICE — CHLORAPREP 26 ML APPLICATOR - ORANGE TINT(25/CA)

## (undated) DEVICE — ELECTRODE 850 FOAM ADHESIVE - HYDROGEL RADIOTRNSPRNT (50/PK)

## (undated) DEVICE — WATER IRRIGATION STERILE 1000ML (12EA/CA)

## (undated) DEVICE — CANISTER SUCTION 3000ML MECHANICAL FILTER AUTO SHUTOFF MEDI-VAC NONSTERILE LF DISP  (40EA/CA)

## (undated) DEVICE — KIT ANESTHESIA W/CIRCUIT & 3/LT BAG W/FILTER (20EA/CA)

## (undated) DEVICE — PROTECTOR ULNA NERVE - (36PR/CA)

## (undated) DEVICE — SLEEVE, VASO, THIGH, MED

## (undated) DEVICE — DRESSING PETROLEUM GAUZE 5 X 9" (50EA/BX 4BX/CA)"

## (undated) DEVICE — KIT  I.V. START (100EA/CA)

## (undated) DEVICE — BLADE SURGICAL #15 - (50/BX 3BX/CA)

## (undated) DEVICE — BLADE SAW SMALL BONE AGGRESSIVE 31.0 X 9 X 0.38MM

## (undated) DEVICE — SENSOR SPO2 NEO LNCS ADHESIVE (20/BX) SEE USER NOTES

## (undated) DEVICE — GLOVE BIOGEL SZ 7.5 SURGICAL PF LTX - (50PR/BX 4BX/CA)

## (undated) DEVICE — TUBING CLEARLINK DUO-VENT - C-FLO (48EA/CA)

## (undated) DEVICE — PADDING CAST 4 IN STERILE - 4 X 4 YDS (24/CA)

## (undated) DEVICE — MASK AIRWAY SIZE 3 UNIQUE SILICON (10/BX)

## (undated) DEVICE — SET LEADWIRE 5 LEAD BEDSIDE DISPOSABLE ECG (1SET OF 5/EA)

## (undated) DEVICE — CANISTER SUCTION RIGID RED 1500CC (40EA/CA)

## (undated) DEVICE — STOCKINET BIAS 4 IN STERILE - (20/CA)

## (undated) DEVICE — TUBE CONNECTING SUCTION - CLEAR PLASTIC STERILE 72 IN (50EA/CA)

## (undated) DEVICE — BOVIE NEEDLE TIP INSULATD NON-SAFETY 2CM (50/PK)

## (undated) DEVICE — SUTURE GENERAL

## (undated) DEVICE — LACTATED RINGERS INJ 1000 ML - (14EA/CA 60CA/PF)

## (undated) DEVICE — MASK ANESTHESIA ADULT  - (100/CA)

## (undated) DEVICE — PACK LOWER EXTREMITY - (2/CA)